# Patient Record
Sex: FEMALE | Race: WHITE | NOT HISPANIC OR LATINO | Employment: UNEMPLOYED | URBAN - METROPOLITAN AREA
[De-identification: names, ages, dates, MRNs, and addresses within clinical notes are randomized per-mention and may not be internally consistent; named-entity substitution may affect disease eponyms.]

---

## 2017-05-01 ENCOUNTER — TELEPHONE (OUTPATIENT)
Dept: RHEUMATOLOGY | Facility: CLINIC | Age: 32
End: 2017-05-01

## 2017-05-01 DIAGNOSIS — M79.7 FIBROMYALGIA: ICD-10-CM

## 2017-05-02 RX ORDER — GABAPENTIN 100 MG/1
CAPSULE ORAL
Qty: 180 CAPSULE | Refills: 0 | Status: SHIPPED | OUTPATIENT
Start: 2017-05-02 | End: 2022-07-01

## 2017-05-02 NOTE — TELEPHONE ENCOUNTER
Patient informed of refill and Dr. Sweeney recommendations for the future refills. Patient verbalized understanding then asked if we were up to date on the new studies that showed the increased in nerve endings with new treatments. Informed patient I would ask if there is any new treatments out for Fibromyalgia .

## 2017-05-02 NOTE — TELEPHONE ENCOUNTER
----- Message from Monisha Sweeney MD sent at 5/2/2017 12:00 PM CDT -----  Let her know I refilled the gabapentin but she needs to ask her primary care doctor or orthopedist for further refills since she will not be coming back to see us in clinic which is fine because her PCP can manage the fibromyalgia.  MB

## 2017-08-09 ENCOUNTER — HISTORICAL (OUTPATIENT)
Dept: INTERNAL MEDICINE | Facility: CLINIC | Age: 32
End: 2017-08-09

## 2017-08-09 LAB
ABS NEUT (OLG): 5.01 X10(3)/MCL (ref 2.1–9.2)
ALBUMIN SERPL-MCNC: 3.9 GM/DL (ref 3.4–5)
ALBUMIN/GLOB SERPL: 1 RATIO (ref 1–2)
ALP SERPL-CCNC: 48 UNIT/L (ref 45–117)
ALT SERPL-CCNC: 29 UNIT/L (ref 12–78)
APPEARANCE, UA: NORMAL
AST SERPL-CCNC: 22 UNIT/L (ref 15–37)
BACTERIA #/AREA URNS AUTO: ABNORMAL /[HPF]
BASOPHILS # BLD AUTO: 0.02 X10(3)/MCL
BASOPHILS NFR BLD AUTO: 0 % (ref 0–1)
BILIRUB SERPL-MCNC: 0.6 MG/DL (ref 0.2–1)
BILIRUB UR QL STRIP: NEGATIVE
BILIRUBIN DIRECT+TOT PNL SERPL-MCNC: 0.1 MG/DL
BILIRUBIN DIRECT+TOT PNL SERPL-MCNC: 0.5 MG/DL
BUN SERPL-MCNC: 19 MG/DL (ref 7–18)
CALCIUM SERPL-MCNC: 9 MG/DL (ref 8.5–10.1)
CHLORIDE SERPL-SCNC: 104 MMOL/L (ref 98–107)
CHOLEST SERPL-MCNC: 157 MG/DL
CHOLEST/HDLC SERPL: 3.1 {RATIO} (ref 0–4.4)
CO2 SERPL-SCNC: 26 MMOL/L (ref 21–32)
COLOR UR: YELLOW
CREAT SERPL-MCNC: 0.8 MG/DL (ref 0.6–1.3)
DEPRECATED CALCIDIOL+CALCIFEROL SERPL-MC: 29.26 NG/ML (ref 30–80)
EOSINOPHIL # BLD AUTO: 0.12 X10(3)/MCL
EOSINOPHIL NFR BLD AUTO: 1 % (ref 0–5)
ERYTHROCYTE [DISTWIDTH] IN BLOOD BY AUTOMATED COUNT: 12 % (ref 11.5–14.5)
ERYTHROCYTE [SEDIMENTATION RATE] IN BLOOD: 13 MM/HR (ref 0–20)
EST. AVERAGE GLUCOSE BLD GHB EST-MCNC: 148 MG/DL
GLOBULIN SER-MCNC: 4 GM/ML (ref 2.3–3.5)
GLUCOSE (UA): NORMAL
GLUCOSE SERPL-MCNC: 85 MG/DL (ref 74–106)
HBA1C MFR BLD: 6.8 % (ref 4.2–6.3)
HCT VFR BLD AUTO: 36.4 % (ref 35–46)
HDLC SERPL-MCNC: 50 MG/DL
HGB BLD-MCNC: 12.3 GM/DL (ref 12–16)
HGB UR QL STRIP: NEGATIVE
HYALINE CASTS #/AREA URNS LPF: ABNORMAL /[LPF]
IMM GRANULOCYTES # BLD AUTO: 0.02 10*3/UL
IMM GRANULOCYTES NFR BLD AUTO: 0 %
KETONES UR QL STRIP: NEGATIVE
LDLC SERPL CALC-MCNC: 86 MG/DL (ref 0–130)
LEUKOCYTE ESTERASE UR QL STRIP: NEGATIVE
LYMPHOCYTES # BLD AUTO: 2.67 X10(3)/MCL
LYMPHOCYTES NFR BLD AUTO: 31 % (ref 15–40)
MAGNESIUM SERPL-MCNC: 2 MG/DL (ref 1.8–2.4)
MCH RBC QN AUTO: 30.4 PG (ref 26–34)
MCHC RBC AUTO-ENTMCNC: 33.8 GM/DL (ref 31–37)
MCV RBC AUTO: 90.1 FL (ref 80–100)
MONOCYTES # BLD AUTO: 0.73 X10(3)/MCL
MONOCYTES NFR BLD AUTO: 8 % (ref 4–12)
NEUTROPHILS # BLD AUTO: 5.01 X10(3)/MCL
NEUTROPHILS NFR BLD AUTO: 58 X10(3)/MCL
NITRITE UR QL STRIP: NEGATIVE
PH UR STRIP: 7.5 [PH] (ref 4.5–8)
PHOSPHATE SERPL-MCNC: 3.3 MG/DL (ref 2.5–4.9)
PLATELET # BLD AUTO: 346 X10(3)/MCL (ref 130–400)
PMV BLD AUTO: 9.7 FL (ref 7.4–10.4)
POTASSIUM SERPL-SCNC: 3.6 MMOL/L (ref 3.5–5.1)
PROT SERPL-MCNC: 7.9 GM/DL (ref 6.4–8.2)
PROT UR QL STRIP: NEGATIVE
RBC # BLD AUTO: 4.04 X10(6)/MCL (ref 4–5.2)
RBC #/AREA URNS AUTO: ABNORMAL /[HPF]
SODIUM SERPL-SCNC: 139 MMOL/L (ref 136–145)
SP GR UR STRIP: 1.02 (ref 1–1.03)
SQUAMOUS #/AREA URNS LPF: ABNORMAL /[LPF]
T4 FREE SERPL-MCNC: 1.14 NG/DL (ref 0.76–1.46)
TRIGL SERPL-MCNC: 106 MG/DL
TSH SERPL-ACNC: 1.14 MIU/L (ref 0.36–3.74)
UROBILINOGEN UR STRIP-ACNC: NORMAL
VLDLC SERPL CALC-MCNC: 21 MG/DL
WBC # SPEC AUTO: 8.6 X10(3)/MCL (ref 4.5–11)
WBC #/AREA URNS AUTO: ABNORMAL /HPF

## 2018-02-16 ENCOUNTER — HISTORICAL (OUTPATIENT)
Dept: INTERNAL MEDICINE | Facility: CLINIC | Age: 33
End: 2018-02-16

## 2018-02-16 LAB
BUN SERPL-MCNC: 15 MG/DL (ref 7–18)
CALCIUM SERPL-MCNC: 9.2 MG/DL (ref 8.5–10.1)
CHLORIDE SERPL-SCNC: 106 MMOL/L (ref 98–107)
CO2 SERPL-SCNC: 26 MMOL/L (ref 21–32)
CREAT SERPL-MCNC: 0.7 MG/DL (ref 0.6–1.3)
CREAT UR-MCNC: 133 MG/DL
DEPRECATED CALCIDIOL+CALCIFEROL SERPL-MC: 31.45 NG/ML (ref 30–80)
EST. AVERAGE GLUCOSE BLD GHB EST-MCNC: 105 MG/DL
GLUCOSE SERPL-MCNC: 86 MG/DL (ref 74–106)
HBA1C MFR BLD: 5.3 % (ref 4.2–6.3)
MICROALBUMIN UR-MCNC: 10.5 MG/L (ref 0–19)
MICROALBUMIN/CREAT RATIO PNL UR: 7.9 MCG/MG CR (ref 0–29)
POTASSIUM SERPL-SCNC: 3.8 MMOL/L (ref 3.5–5.1)
SODIUM SERPL-SCNC: 139 MMOL/L (ref 136–145)

## 2019-02-01 ENCOUNTER — HISTORICAL (OUTPATIENT)
Dept: INTERNAL MEDICINE | Facility: CLINIC | Age: 34
End: 2019-02-01

## 2019-02-01 LAB
BUN SERPL-MCNC: 21 MG/DL (ref 7–18)
CALCIUM SERPL-MCNC: 8.9 MG/DL (ref 8.5–10.1)
CHLORIDE SERPL-SCNC: 105 MMOL/L (ref 98–107)
CO2 SERPL-SCNC: 28 MMOL/L (ref 21–32)
CREAT SERPL-MCNC: 0.7 MG/DL (ref 0.6–1.3)
CREAT/UREA NIT SERPL: 30
EST. AVERAGE GLUCOSE BLD GHB EST-MCNC: 105 MG/DL
GLUCOSE SERPL-MCNC: 78 MG/DL (ref 74–106)
HBA1C MFR BLD: 5.3 % (ref 4.2–6.3)
POTASSIUM SERPL-SCNC: 3.8 MMOL/L (ref 3.5–5.1)
SODIUM SERPL-SCNC: 139 MMOL/L (ref 136–145)

## 2019-02-15 LAB — POC BETA-HCG (QUAL): NEGATIVE

## 2019-07-17 ENCOUNTER — HISTORICAL (OUTPATIENT)
Dept: GASTROENTEROLOGY | Facility: CLINIC | Age: 34
End: 2019-07-17

## 2019-07-17 LAB
ABS NEUT (OLG): 3.29 X10(3)/MCL (ref 2.1–9.2)
ALBUMIN SERPL-MCNC: 4.3 GM/DL (ref 3.4–5)
ALBUMIN/GLOB SERPL: 1 RATIO (ref 1.1–2)
ALP SERPL-CCNC: 59 UNIT/L (ref 45–117)
ALT SERPL-CCNC: 30 UNIT/L (ref 12–78)
AST SERPL-CCNC: 18 UNIT/L (ref 15–37)
BASOPHILS # BLD AUTO: 0.03 X10(3)/MCL
BASOPHILS NFR BLD AUTO: 0 %
BILIRUB SERPL-MCNC: 0.6 MG/DL (ref 0.2–1)
BILIRUBIN DIRECT+TOT PNL SERPL-MCNC: 0.2 MG/DL
BILIRUBIN DIRECT+TOT PNL SERPL-MCNC: 0.4 MG/DL
BUN SERPL-MCNC: 11 MG/DL (ref 7–18)
CALCIUM SERPL-MCNC: 9.7 MG/DL (ref 8.5–10.1)
CHLORIDE SERPL-SCNC: 108 MMOL/L (ref 98–107)
CO2 SERPL-SCNC: 30 MMOL/L (ref 21–32)
CREAT SERPL-MCNC: 0.8 MG/DL (ref 0.6–1.3)
DEPRECATED CALCIDIOL+CALCIFEROL SERPL-MC: 33.5 NG/ML (ref 30–80)
EOSINOPHIL # BLD AUTO: 0.2 X10(3)/MCL
EOSINOPHIL NFR BLD AUTO: 3 %
ERYTHROCYTE [DISTWIDTH] IN BLOOD BY AUTOMATED COUNT: 13 % (ref 11.5–14.5)
EST. AVERAGE GLUCOSE BLD GHB EST-MCNC: 105 MG/DL
FERRITIN SERPL-MCNC: 15.9 NG/ML (ref 10–150)
GLOBULIN SER-MCNC: 4.1 GM/ML (ref 2.3–3.5)
GLUCOSE SERPL-MCNC: 90 MG/DL (ref 74–106)
HBA1C MFR BLD: 5.3 % (ref 4.2–6.3)
HCT VFR BLD AUTO: 39.9 % (ref 35–46)
HGB BLD-MCNC: 12.7 GM/DL (ref 12–16)
IMM GRANULOCYTES # BLD AUTO: 0.01 10*3/UL
IMM GRANULOCYTES NFR BLD AUTO: 0 %
LYMPHOCYTES # BLD AUTO: 2.65 X10(3)/MCL
LYMPHOCYTES NFR BLD AUTO: 39 % (ref 13–40)
MCH RBC QN AUTO: 30.3 PG (ref 26–34)
MCHC RBC AUTO-ENTMCNC: 31.8 GM/DL (ref 31–37)
MCV RBC AUTO: 95.2 FL (ref 80–100)
MONOCYTES # BLD AUTO: 0.56 X10(3)/MCL
MONOCYTES NFR BLD AUTO: 8 % (ref 4–12)
NEUTROPHILS # BLD AUTO: 3.29 X10(3)/MCL
NEUTROPHILS NFR BLD AUTO: 49 X10(3)/MCL
PLATELET # BLD AUTO: 356 X10(3)/MCL (ref 130–400)
PMV BLD AUTO: 9.2 FL (ref 7.4–10.4)
POTASSIUM SERPL-SCNC: 3.9 MMOL/L (ref 3.5–5.1)
PROT SERPL-MCNC: 8.4 GM/DL (ref 6.4–8.2)
RBC # BLD AUTO: 4.19 X10(6)/MCL (ref 4–5.2)
SODIUM SERPL-SCNC: 140 MMOL/L (ref 136–145)
TSH SERPL-ACNC: 2.03 MIU/L (ref 0.36–3.74)
WBC # SPEC AUTO: 6.7 X10(3)/MCL (ref 4.5–11)

## 2019-11-08 ENCOUNTER — HISTORICAL (OUTPATIENT)
Dept: INTERNAL MEDICINE | Facility: CLINIC | Age: 34
End: 2019-11-08

## 2019-11-08 LAB
ABS NEUT (OLG): 6.29 X10(3)/MCL (ref 2.1–9.2)
ALBUMIN SERPL-MCNC: 4.2 GM/DL (ref 3.4–5)
ALBUMIN/GLOB SERPL: 1.2 RATIO (ref 1.1–2)
ALP SERPL-CCNC: 61 UNIT/L (ref 45–117)
ALT SERPL-CCNC: 19 UNIT/L (ref 12–78)
AST SERPL-CCNC: 16 UNIT/L (ref 15–37)
BASOPHILS # BLD AUTO: 0 X10(3)/MCL (ref 0–0.2)
BASOPHILS NFR BLD AUTO: 0 %
BILIRUB SERPL-MCNC: 0.9 MG/DL (ref 0.2–1)
BILIRUBIN DIRECT+TOT PNL SERPL-MCNC: 0.2 MG/DL (ref 0–0.2)
BILIRUBIN DIRECT+TOT PNL SERPL-MCNC: 0.7 MG/DL
BUN SERPL-MCNC: 22 MG/DL (ref 7–18)
CALCIUM SERPL-MCNC: 9.2 MG/DL (ref 8.5–10.1)
CHLORIDE SERPL-SCNC: 106 MMOL/L (ref 98–107)
CO2 SERPL-SCNC: 27 MMOL/L (ref 21–32)
CREAT SERPL-MCNC: 0.8 MG/DL (ref 0.6–1.3)
EOSINOPHIL # BLD AUTO: 0 X10(3)/MCL (ref 0–0.9)
EOSINOPHIL NFR BLD AUTO: 0 %
ERYTHROCYTE [DISTWIDTH] IN BLOOD BY AUTOMATED COUNT: 12.2 % (ref 11.5–14.5)
GLOBULIN SER-MCNC: 3.6 GM/ML (ref 2.3–3.5)
GLUCOSE SERPL-MCNC: 91 MG/DL (ref 74–106)
HCT VFR BLD AUTO: 39 % (ref 35–46)
HGB BLD-MCNC: 12.4 GM/DL (ref 12–16)
IMM GRANULOCYTES # BLD AUTO: 0.03 10*3/UL
IMM GRANULOCYTES NFR BLD AUTO: 0 %
LYMPHOCYTES # BLD AUTO: 2.6 X10(3)/MCL (ref 0.6–4.6)
LYMPHOCYTES NFR BLD AUTO: 26 %
MCH RBC QN AUTO: 30.5 PG (ref 26–34)
MCHC RBC AUTO-ENTMCNC: 31.8 GM/DL (ref 31–37)
MCV RBC AUTO: 95.8 FL (ref 80–100)
MONOCYTES # BLD AUTO: 1 X10(3)/MCL (ref 0.1–1.3)
MONOCYTES NFR BLD AUTO: 10 %
NEUTROPHILS # BLD AUTO: 6.29 X10(3)/MCL (ref 2.1–9.2)
NEUTROPHILS NFR BLD AUTO: 63 %
PLATELET # BLD AUTO: 353 X10(3)/MCL (ref 130–400)
PMV BLD AUTO: 9.2 FL (ref 7.4–10.4)
POTASSIUM SERPL-SCNC: 4.1 MMOL/L (ref 3.5–5.1)
PROT SERPL-MCNC: 7.8 GM/DL (ref 6.4–8.2)
RBC # BLD AUTO: 4.07 X10(6)/MCL (ref 4–5.2)
SODIUM SERPL-SCNC: 140 MMOL/L (ref 136–145)
WBC # SPEC AUTO: 9.9 X10(3)/MCL (ref 4.5–11)

## 2019-12-30 ENCOUNTER — HISTORICAL (OUTPATIENT)
Dept: ENDOSCOPY | Facility: HOSPITAL | Age: 34
End: 2019-12-30

## 2020-02-04 ENCOUNTER — HISTORICAL (OUTPATIENT)
Dept: INTERNAL MEDICINE | Facility: CLINIC | Age: 35
End: 2020-02-04

## 2020-02-04 LAB
ABS NEUT (OLG): 3.53 X10(3)/MCL (ref 2.1–9.2)
ALBUMIN SERPL-MCNC: 3.8 GM/DL (ref 3.4–5)
ALBUMIN/GLOB SERPL: 1 RATIO (ref 1.1–2)
ALP SERPL-CCNC: 64 UNIT/L (ref 45–117)
ALT SERPL-CCNC: 24 UNIT/L (ref 12–78)
AST SERPL-CCNC: 14 UNIT/L (ref 15–37)
BASOPHILS # BLD AUTO: 0 X10(3)/MCL (ref 0–0.2)
BASOPHILS NFR BLD AUTO: 0 %
BILIRUB SERPL-MCNC: 0.5 MG/DL (ref 0.2–1)
BILIRUBIN DIRECT+TOT PNL SERPL-MCNC: 0.1 MG/DL (ref 0–0.2)
BILIRUBIN DIRECT+TOT PNL SERPL-MCNC: 0.4 MG/DL
BUN SERPL-MCNC: 18 MG/DL (ref 7–18)
CALCIUM SERPL-MCNC: 9 MG/DL (ref 8.5–10.1)
CHLORIDE SERPL-SCNC: 106 MMOL/L (ref 98–107)
CO2 SERPL-SCNC: 27 MMOL/L (ref 21–32)
CREAT SERPL-MCNC: 0.7 MG/DL (ref 0.6–1.3)
EOSINOPHIL # BLD AUTO: 0.1 X10(3)/MCL (ref 0–0.9)
EOSINOPHIL NFR BLD AUTO: 2 %
ERYTHROCYTE [DISTWIDTH] IN BLOOD BY AUTOMATED COUNT: 11.9 % (ref 11.5–14.5)
EST. AVERAGE GLUCOSE BLD GHB EST-MCNC: 100 MG/DL
GLOBULIN SER-MCNC: 3.9 GM/ML (ref 2.3–3.5)
GLUCOSE SERPL-MCNC: 94 MG/DL (ref 74–106)
HBA1C MFR BLD: 5.1 % (ref 4.2–6.3)
HCT VFR BLD AUTO: 36.2 % (ref 35–46)
HGB BLD-MCNC: 11.6 GM/DL (ref 12–16)
IMM GRANULOCYTES # BLD AUTO: 0.01 10*3/UL
IMM GRANULOCYTES NFR BLD AUTO: 0 %
LYMPHOCYTES # BLD AUTO: 2 X10(3)/MCL (ref 0.6–4.6)
LYMPHOCYTES NFR BLD AUTO: 32 %
MCH RBC QN AUTO: 29.7 PG (ref 26–34)
MCHC RBC AUTO-ENTMCNC: 32 GM/DL (ref 31–37)
MCV RBC AUTO: 92.8 FL (ref 80–100)
MONOCYTES # BLD AUTO: 0.6 X10(3)/MCL (ref 0.1–1.3)
MONOCYTES NFR BLD AUTO: 9 %
NEUTROPHILS # BLD AUTO: 3.53 X10(3)/MCL (ref 2.1–9.2)
NEUTROPHILS NFR BLD AUTO: 57 %
PLATELET # BLD AUTO: 362 X10(3)/MCL (ref 130–400)
PMV BLD AUTO: 9.1 FL (ref 7.4–10.4)
POTASSIUM SERPL-SCNC: 3.9 MMOL/L (ref 3.5–5.1)
PROT SERPL-MCNC: 7.7 GM/DL (ref 6.4–8.2)
RBC # BLD AUTO: 3.9 X10(6)/MCL (ref 4–5.2)
SODIUM SERPL-SCNC: 137 MMOL/L (ref 136–145)
WBC # SPEC AUTO: 6.2 X10(3)/MCL (ref 4.5–11)

## 2020-05-21 ENCOUNTER — HISTORICAL (OUTPATIENT)
Dept: LAB | Facility: HOSPITAL | Age: 35
End: 2020-05-21

## 2020-05-21 LAB
ABS NEUT (OLG): 4.31 X10(3)/MCL (ref 2.1–9.2)
ALBUMIN SERPL-MCNC: 3.9 GM/DL (ref 3.4–5)
ALBUMIN/GLOB SERPL: 0.9 RATIO (ref 1.1–2)
ALP SERPL-CCNC: 68 UNIT/L (ref 45–117)
ALT SERPL-CCNC: 26 UNIT/L (ref 12–78)
AST SERPL-CCNC: 21 UNIT/L (ref 15–37)
BASOPHILS # BLD AUTO: 0 X10(3)/MCL (ref 0–0.2)
BASOPHILS NFR BLD AUTO: 0 %
BILIRUB SERPL-MCNC: 0.7 MG/DL (ref 0.2–1)
BILIRUBIN DIRECT+TOT PNL SERPL-MCNC: 0.1 MG/DL (ref 0–0.2)
BILIRUBIN DIRECT+TOT PNL SERPL-MCNC: 0.6 MG/DL
BUN SERPL-MCNC: 9 MG/DL (ref 7–18)
CALCIUM SERPL-MCNC: 9.2 MG/DL (ref 8.5–10.1)
CHLORIDE SERPL-SCNC: 105 MMOL/L (ref 98–107)
CO2 SERPL-SCNC: 27 MMOL/L (ref 21–32)
CREAT SERPL-MCNC: 0.8 MG/DL (ref 0.6–1.3)
EOSINOPHIL # BLD AUTO: 0.2 X10(3)/MCL (ref 0–0.9)
EOSINOPHIL NFR BLD AUTO: 2 %
ERYTHROCYTE [DISTWIDTH] IN BLOOD BY AUTOMATED COUNT: 12.4 % (ref 11.5–14.5)
GLOBULIN SER-MCNC: 4.5 GM/ML (ref 2.3–3.5)
GLUCOSE SERPL-MCNC: 94 MG/DL (ref 74–106)
HBV SURFACE AG SERPL QL IA: NONREACTIVE
HCT VFR BLD AUTO: 38.3 % (ref 35–46)
HCV AB SERPL QL IA: NONREACTIVE
HGB BLD-MCNC: 12.5 GM/DL (ref 12–16)
HIV 1+2 AB+HIV1 P24 AG SERPL QL IA: NONREACTIVE
IMM GRANULOCYTES # BLD AUTO: 0.02 10*3/UL
IMM GRANULOCYTES NFR BLD AUTO: 0 %
LYMPHOCYTES # BLD AUTO: 2.4 X10(3)/MCL (ref 0.6–4.6)
LYMPHOCYTES NFR BLD AUTO: 31 %
MCH RBC QN AUTO: 29.8 PG (ref 26–34)
MCHC RBC AUTO-ENTMCNC: 32.6 GM/DL (ref 31–37)
MCV RBC AUTO: 91.2 FL (ref 80–100)
MONOCYTES # BLD AUTO: 0.6 X10(3)/MCL (ref 0.1–1.3)
MONOCYTES NFR BLD AUTO: 8 %
NEUTROPHILS # BLD AUTO: 4.31 X10(3)/MCL (ref 2.1–9.2)
NEUTROPHILS NFR BLD AUTO: 57 %
PLATELET # BLD AUTO: 367 X10(3)/MCL (ref 130–400)
PMV BLD AUTO: 9 FL (ref 7.4–10.4)
POTASSIUM SERPL-SCNC: 3.9 MMOL/L (ref 3.5–5.1)
PROT SERPL-MCNC: 8.4 GM/DL (ref 6.4–8.2)
RBC # BLD AUTO: 4.2 X10(6)/MCL (ref 4–5.2)
SODIUM SERPL-SCNC: 138 MMOL/L (ref 136–145)
T PALLIDUM AB SER QL: NONREACTIVE
T4 FREE SERPL-MCNC: 1.11 NG/DL (ref 0.76–1.46)
TSH SERPL-ACNC: 1.64 MIU/L (ref 0.36–3.74)
WBC # SPEC AUTO: 7.5 X10(3)/MCL (ref 4.5–11)

## 2020-05-25 ENCOUNTER — HISTORICAL (OUTPATIENT)
Dept: LAB | Facility: HOSPITAL | Age: 35
End: 2020-05-25

## 2020-05-25 ENCOUNTER — HISTORICAL (OUTPATIENT)
Dept: ADMINISTRATIVE | Facility: HOSPITAL | Age: 35
End: 2020-05-25

## 2020-05-25 LAB
CRP SERPL-MCNC: <0.3 MG/DL
ERYTHROCYTE [SEDIMENTATION RATE] IN BLOOD: 12 MM/HR (ref 0–20)

## 2020-06-05 ENCOUNTER — HISTORICAL (OUTPATIENT)
Dept: SLEEP MEDICINE | Facility: HOSPITAL | Age: 35
End: 2020-06-05

## 2020-08-28 ENCOUNTER — HISTORICAL (OUTPATIENT)
Dept: GASTROENTEROLOGY | Facility: CLINIC | Age: 35
End: 2020-08-28

## 2020-08-28 LAB
ABS NEUT (OLG): 7.14 X10(3)/MCL (ref 2.1–9.2)
ALBUMIN SERPL-MCNC: 3.9 GM/DL (ref 3.4–5)
ALBUMIN/GLOB SERPL: 0.9 RATIO (ref 1.1–2)
ALP SERPL-CCNC: 58 UNIT/L (ref 45–117)
ALT SERPL-CCNC: 26 UNIT/L (ref 12–78)
AST SERPL-CCNC: 17 UNIT/L (ref 15–37)
BASOPHILS # BLD AUTO: 0 X10(3)/MCL (ref 0–0.2)
BASOPHILS NFR BLD AUTO: 0 %
BILIRUB SERPL-MCNC: 0.3 MG/DL (ref 0.2–1)
BILIRUBIN DIRECT+TOT PNL SERPL-MCNC: 0.1 MG/DL (ref 0–0.2)
BILIRUBIN DIRECT+TOT PNL SERPL-MCNC: 0.2 MG/DL
BUN SERPL-MCNC: 25 MG/DL (ref 7–18)
CALCIUM SERPL-MCNC: 9.8 MG/DL (ref 8.5–10.1)
CHLORIDE SERPL-SCNC: 101 MMOL/L (ref 98–107)
CO2 SERPL-SCNC: 26 MMOL/L (ref 21–32)
CREAT SERPL-MCNC: 0.8 MG/DL (ref 0.6–1.3)
CRP SERPL-MCNC: <0.3 MG/DL
EOSINOPHIL # BLD AUTO: 0.3 X10(3)/MCL (ref 0–0.9)
EOSINOPHIL NFR BLD AUTO: 3 %
ERYTHROCYTE [DISTWIDTH] IN BLOOD BY AUTOMATED COUNT: 12.2 % (ref 11.5–14.5)
GLOBULIN SER-MCNC: 4.2 GM/ML (ref 2.3–3.5)
GLUCOSE SERPL-MCNC: 86 MG/DL (ref 74–106)
HCT VFR BLD AUTO: 39.7 % (ref 35–46)
HGB BLD-MCNC: 13 GM/DL (ref 12–16)
IMM GRANULOCYTES # BLD AUTO: 0.06 10*3/UL
IMM GRANULOCYTES NFR BLD AUTO: 0 %
LYMPHOCYTES # BLD AUTO: 3.4 X10(3)/MCL (ref 0.6–4.6)
LYMPHOCYTES NFR BLD AUTO: 29 %
MCH RBC QN AUTO: 30.2 PG (ref 26–34)
MCHC RBC AUTO-ENTMCNC: 32.7 GM/DL (ref 31–37)
MCV RBC AUTO: 92.1 FL (ref 80–100)
MONOCYTES # BLD AUTO: 0.9 X10(3)/MCL (ref 0.1–1.3)
MONOCYTES NFR BLD AUTO: 8 %
NEUTROPHILS # BLD AUTO: 7.14 X10(3)/MCL (ref 2.1–9.2)
NEUTROPHILS NFR BLD AUTO: 60 %
PLATELET # BLD AUTO: 432 X10(3)/MCL (ref 130–400)
PMV BLD AUTO: 9 FL (ref 7.4–10.4)
POTASSIUM SERPL-SCNC: 4 MMOL/L (ref 3.5–5.1)
PROT SERPL-MCNC: 8.1 GM/DL (ref 6.4–8.2)
RBC # BLD AUTO: 4.31 X10(6)/MCL (ref 4–5.2)
SODIUM SERPL-SCNC: 136 MMOL/L (ref 136–145)
WBC # SPEC AUTO: 11.9 X10(3)/MCL (ref 4.5–11)

## 2020-09-11 ENCOUNTER — HISTORICAL (OUTPATIENT)
Dept: RADIOLOGY | Facility: HOSPITAL | Age: 35
End: 2020-09-11

## 2020-09-24 ENCOUNTER — HISTORICAL (OUTPATIENT)
Dept: GASTROENTEROLOGY | Facility: CLINIC | Age: 35
End: 2020-09-24

## 2020-11-02 ENCOUNTER — HISTORICAL (OUTPATIENT)
Dept: LAB | Facility: HOSPITAL | Age: 35
End: 2020-11-02

## 2020-11-06 LAB — POC BETA-HCG (QUAL): NEGATIVE

## 2020-11-20 ENCOUNTER — HISTORICAL (OUTPATIENT)
Dept: GASTROENTEROLOGY | Facility: CLINIC | Age: 35
End: 2020-11-20

## 2020-11-25 ENCOUNTER — HISTORICAL (OUTPATIENT)
Dept: ENDOSCOPY | Facility: HOSPITAL | Age: 35
End: 2020-11-25

## 2020-11-25 LAB — CRC RECOMMENDATION EXT: NORMAL

## 2021-04-26 ENCOUNTER — HISTORICAL (OUTPATIENT)
Dept: INTERNAL MEDICINE | Facility: CLINIC | Age: 36
End: 2021-04-26

## 2021-04-26 LAB
ABS NEUT (OLG): 5.3 X10(3)/MCL (ref 2.1–9.2)
ALBUMIN SERPL-MCNC: 3.9 GM/DL (ref 3.5–5)
ALBUMIN/GLOB SERPL: 1.2 RATIO (ref 1.1–2)
ALP SERPL-CCNC: 54 UNIT/L (ref 40–150)
ALT SERPL-CCNC: 16 UNIT/L (ref 0–55)
AST SERPL-CCNC: 27 UNIT/L (ref 5–34)
BASOPHILS # BLD AUTO: 0 X10(3)/MCL (ref 0–0.2)
BASOPHILS NFR BLD AUTO: 0 %
BILIRUB SERPL-MCNC: 0.3 MG/DL
BILIRUBIN DIRECT+TOT PNL SERPL-MCNC: 0.1 MG/DL (ref 0–0.5)
BILIRUBIN DIRECT+TOT PNL SERPL-MCNC: 0.2 MG/DL (ref 0–0.8)
BUN SERPL-MCNC: 16.2 MG/DL (ref 7–18.7)
CALCIUM SERPL-MCNC: 9.4 MG/DL (ref 8.4–10.2)
CHLORIDE SERPL-SCNC: 104 MMOL/L (ref 98–107)
CO2 SERPL-SCNC: 24 MMOL/L (ref 22–29)
CREAT SERPL-MCNC: 0.72 MG/DL (ref 0.55–1.02)
DEPRECATED CALCIDIOL+CALCIFEROL SERPL-MC: 36.8 NG/ML (ref 30–80)
EOSINOPHIL # BLD AUTO: 0.1 X10(3)/MCL (ref 0–0.9)
EOSINOPHIL NFR BLD AUTO: 1 %
ERYTHROCYTE [DISTWIDTH] IN BLOOD BY AUTOMATED COUNT: 11.9 % (ref 11.5–14.5)
GLOBULIN SER-MCNC: 3.3 GM/DL (ref 2.4–3.5)
GLUCOSE SERPL-MCNC: 90 MG/DL (ref 74–100)
HCT VFR BLD AUTO: 36 % (ref 35–46)
HGB BLD-MCNC: 11.8 GM/DL (ref 12–16)
IMM GRANULOCYTES # BLD AUTO: 0.03 10*3/UL
IMM GRANULOCYTES NFR BLD AUTO: 0 %
LYMPHOCYTES # BLD AUTO: 2.8 X10(3)/MCL (ref 0.6–4.6)
LYMPHOCYTES NFR BLD AUTO: 32 %
MCH RBC QN AUTO: 30.5 PG (ref 26–34)
MCHC RBC AUTO-ENTMCNC: 32.8 GM/DL (ref 31–37)
MCV RBC AUTO: 93 FL (ref 80–100)
MONOCYTES # BLD AUTO: 0.7 X10(3)/MCL (ref 0.1–1.3)
MONOCYTES NFR BLD AUTO: 8 %
NEUTROPHILS # BLD AUTO: 5.3 X10(3)/MCL (ref 2.1–9.2)
NEUTROPHILS NFR BLD AUTO: 59 %
PLATELET # BLD AUTO: 341 X10(3)/MCL (ref 130–400)
PMV BLD AUTO: 9.5 FL (ref 7.4–10.4)
POTASSIUM SERPL-SCNC: 3.8 MMOL/L (ref 3.5–5.1)
PROT SERPL-MCNC: 7.2 GM/DL (ref 6.4–8.3)
RBC # BLD AUTO: 3.87 X10(6)/MCL (ref 4–5.2)
SODIUM SERPL-SCNC: 137 MMOL/L (ref 136–145)
WBC # SPEC AUTO: 9 X10(3)/MCL (ref 4.5–11)

## 2021-05-12 ENCOUNTER — PATIENT MESSAGE (OUTPATIENT)
Dept: RESEARCH | Facility: HOSPITAL | Age: 36
End: 2021-05-12

## 2021-11-10 ENCOUNTER — HISTORICAL (OUTPATIENT)
Dept: ADMINISTRATIVE | Facility: HOSPITAL | Age: 36
End: 2021-11-10

## 2022-04-10 ENCOUNTER — HISTORICAL (OUTPATIENT)
Dept: ADMINISTRATIVE | Facility: HOSPITAL | Age: 37
End: 2022-04-10
Payer: COMMERCIAL

## 2022-04-11 ENCOUNTER — HISTORICAL (OUTPATIENT)
Dept: ADMINISTRATIVE | Facility: HOSPITAL | Age: 37
End: 2022-04-11
Payer: COMMERCIAL

## 2022-04-28 VITALS
SYSTOLIC BLOOD PRESSURE: 114 MMHG | BODY MASS INDEX: 25.07 KG/M2 | WEIGHT: 136.25 LBS | HEIGHT: 62 IN | DIASTOLIC BLOOD PRESSURE: 83 MMHG | OXYGEN SATURATION: 99 %

## 2022-04-28 VITALS
OXYGEN SATURATION: 99 % | DIASTOLIC BLOOD PRESSURE: 83 MMHG | WEIGHT: 136.25 LBS | SYSTOLIC BLOOD PRESSURE: 114 MMHG | HEIGHT: 62 IN | BODY MASS INDEX: 25.07 KG/M2

## 2022-05-03 NOTE — HISTORICAL [facility] C[facility]ER
This is a historical note converted from [employer]. Formatting and pictures may have been removed.  Please reference [employer] for original formatting and attached multimedia. Chief Complaint  Constipation  History of Present Illness  Patient is a 34 yo F referred for colonoscopy to evaluate a h/o constipation. The patient has been diagnosed w/ IBS and takes Linzess. Without Linzess, she never has a BM. With Linzess, she has a BM every 1-2 days. Her BMs are always liquid. She denies noticing any blood in/on the stool. She also reports near-daily lower abdominal pain. Sometimes the pain is constant, sometimes it is intermittent. She describes the pain and cramping, burning, and feeling her intestines move. When it has been more than 1-2 days since she has had a BM, she has some nausea and dysphagia. She denies any changes in her appetite or unintentional weight changes.  ?  She has had an EGD in the past (about a year ago), which she reports was normal. She has never had a colonoscopy. She denies any known personal or FHx of problems w/ anesthesia or bleeding disorders. She does not take any blood thinners. She does take NSAIDs for hip pain, but has not taken any recently. She?has a +FHx of Crohns disease (brother, uncle), but denies any known FHx of esophageal, gastric, colon, or rectal cancers.  Review of Systems  Negative except as mentioned in HPI.  Physical Exam  Gen: NAD  Neuro/Psych: awake, alert, answering questions appropriately  CV: RRR  Resp: non-labored breathing, TUSHAR  Abd: soft, ND, NT  Ext: moves all 4 spontaneously and purposefully  Skin: warm, well-perfused  Wounds: none  Assessment/Plan  34 yo F w/ constipation. Plan to proceed w/ colonoscopy today. Written informed consent obtained. If no complications arise and patient tolerates procedure well, plan to dc home post-procedure.   Problem List/Past Medical History  Ongoing  Anxiety  AR (allergic rhinitis)  Cervical dysplasia  Chronic  migraine  Constipation  Depression  Fibromyalgia  IBS (irritable bowel syndrome)  Migraine without aura, not intractable, with status migrainosus  Nasal congestion  Pelvic pain  Tension headache  Vulvodynia  Historical  Pregnant  Procedure/Surgical History  Esophagogastroduodenoscopy, flexible, transoral; diagnostic, including collection of specimen(s) by brushing or washing, when performed (separate procedure) (12/30/2019)  Lithotripsy (01/01/2006)  Colposcopy of cervix  Varicose vein operation   Medications  Home  acetaminophen-tramadol 325 mg-37.5 mg oral tablet, See Instructions, 2 refills  Astelin 137 mcg/inh nasal spray, 1 spray(s), Nasal, BID, 5 refills  atenolol 25 mg oral tablet, 25 mg= 1 tab(s), Oral, At Bedtime, 4 refills  docusate-senna 50 mg-8.6 mg oral tablet, 2 tab(s), Oral, Once a day (at bedtime), PRN, 1 refills  DULoxetine 60 mg oral delayed release capsule, 60 mg= 1 cap(s), Oral, Daily  Estrace Vaginal 0.1 mg/g vaginal cream, See Instructions  fluticasone 50 mcg/inh nasal spray, 1 spray(s), Nasal, BID, 5 refills  ibuprofen 800 mg oral tablet, 800 mg= 1 tab(s), Oral, q8hr  Linzess 290 mcg oral capsule, 290 mcg= 1 cap(s), Oral, Daily, 5 refills  loratadine 10 mg oral tablet, 10 mg= 1 tab(s), Oral, Daily, 3 refills  Maxalt 10 mg oral tablet, 10 mg= 1 tab(s), Oral, Daily, PRN, 3 refills  riboflavin 400 mg oral capsule, 400 mg= 1 cap(s), Oral, Daily, 6 refills  Singulair 10 mg oral TABLET, 10 mg= 1 tab(s), Oral, Daily, 2 refills  Sprintec 0.25 mg-35 mcg oral tablet, 1 tab(s), Oral, Daily, 6 refills  traZODone 100 mg oral tablet, See Instructions  Allergies  Sulfabenzamide/Sulfacetamide/Sulfathiazole?(Rash)  Social History  Alcohol  Current, Liquor, 1-2 times per year, Alcohol use interferes with work or home: No. Others hurt by drinking: No. Household alcohol concerns: Yes., 10/28/2020  Substance Use  Never, 10/03/2017  Tobacco  Never (less than 100 in lifetime), N/A, 11/24/2020  Family History  Crohns  disease.: Brother and Uncle.  Primary malignant neoplasm of breast: Aunt.  Primary malignant neoplasm of prostate: Paternal Grandfather.      Chronic constipation for several years.? Seeing isacc in clinic and started on Linzess, uptitrated to 290mcg daily.? Taking Linzess daily along with water and usually has a BM every day but will sometimes miss days as well.? this past week she was having significant constipation despite linzess and even took 2 linzess to try to help.? She has chronic lower abd pain, sometimes intermittent cramping and spasming.? Not always related to BMs or constipation.? Has been given bentyl but has never tried it.? WEight is stable.? No significant bleeding.

## 2022-05-03 NOTE — HISTORICAL [facility]
This is a historical note converted from [employer]. Formatting and pictures may have been removed.  Please reference [employer] for original formatting and attached multimedia. Chief Complaint  abdominal pain, bloating  History of Present Illness  33 yo F with hx of fibromyalgia and chronic constipation who is followed regularly in GI clinic for chronic constipation and GERD who presents today for EGD.?See?clinic note for full?history.?Her main complaints today?are of epigastric pain, bloating, belching, and flatulence.? These symptoms are worsened with ingestion of sugar or gluten, which she tries to avoid.? Weight is stable.? She does have occasional reflux as well and takes OTC tums prn.??? She denies nocturnal symptoms, vomiting, fever, chills, dysphagia.? She does take linzess daily for constipation and has a soft bowel movement daily as long as she adheres to that regimen. Sometimes needs to supplement with Senna.??She has nausea without emesis when?constipated.?She denies melena,?hematochezia,?or incontinence.? No prior endoscopy.??Her brother and uncle have a history of Crohns disease, father has ulcerative colitis. No polyps or colon cancer to her knowledge.  Review of Systems  12 point review of systems was conducted and is significant for the above findings.  Physical Exam  Vitals & Measurements  T:?36.7? ?C (Oral)? HR:?80(Monitored)? RR:?18? BP:?124/91? SpO2:?100%? WT:?60.9?kg? BMI:?24.56?  Afebrile, VSS  Gen: NAD, AAOx3  Neuro: CN II-XII grossly intact  HEENT: NCAT, PERRLA, EOMI  CV: Normal rate, regular rhythm, no murmurs/gallops/rubs  Resp: Clear to auscultation bilaterally, no wheezes or rhonchi  Abdomen: soft, mild tenderness bilateral lower quadrants, mildly distended  Extremities: warm and well perfused, no clubbing or edema  Skin: no rashes or lesions  Assessment/Plan  33 yo F with constipation, GERD, bloating  - proceed with EGD today  - continue current bowel regimen  - risks/benefits/alternatives  discussed  - see op note for details  ?   Problem List/Past Medical History  Ongoing  Cervical dysplasia  Chronic migraine  Constipation  Historical  No qualifying data  Procedure/Surgical History  Lithotripsy (01/01/2006)  Colposcopy of cervix  Varicose vein operation   Medications  Inpatient  [id number] 1,000 mL, 1000 mL, IV  Home  docusate-senna 50 mg-8.6 mg oral tablet, 2 tab(s), Oral, Once a day (at bedtime), PRN, 2 refills  DULoxetine 60 mg oral delayed release capsule, See Instructions  Levsin 0.125 mg oral tablet, 0.125 mg= 1 tab(s), Oral, q8hr, PRN, 2 refills,? ?Not taking  Linzess 145 mcg oral capsule, 145 mcg= 1 cap(s), Oral, Daily,? ?Not taking  Linzess 290 mcg oral capsule, 290 mcg= 1 cap(s), Oral, Daily, 5 refills  multivitamin with minerals (Adult Tab), 1 tab(s), Oral, Daily  SUMAtriptan 50 mg oral tablet, See Instructions, PRN, 3 refills  Zyrtec 10 mg oral tablet, 10 mg= 1 tab(s), Oral, Daily,? 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 Alcohol abuse in household: No. Substance abuse in household: No. Smoker in household: No. Injuries/Abuse/Neglect in household: No. Feels unsafe at home: No. Safe place to go: Yes. Family/Friends available for support: Yes., 02/05/2019  Nutrition/Health  Regular, 02/05/2019  Sexual  Sexually active: Yes. Sexual orientation: Straight or heterosexual. Uses condoms: No., 02/15/2019  Substance Use  Never, 10/03/2017  Tobacco  Never (less than 100 in lifetime), N/A, 12/30/2019  Family History  Crohns disease.: Brother and Uncle.  Primary malignant neoplasm of breast: Aunt.  Primary malignant neoplasm of prostate: Paternal Grandfather.  Lab Results  Test Name Test Result Date/Time   Sodium Lvl 140 mmol/L 11/08/2019 10:50  CST   Potassium Lvl 4.1 mmol/L 11/08/2019 10:50 CST   Chloride 106 mmol/L 11/08/2019 10:50 CST   CO2 27 mmol/L 11/08/2019 10:50 CST   Calcium Lvl 9.2 mg/dL 11/08/2019 10:50 CST   Glucose Lvl 91 mg/dL 11/08/2019 10:50 CST   BUN 22 mg/dL (High) 11/08/2019 10:50 CST   Creatinine 0.80 mg/dL 11/08/2019 10:50 CST   Bili Total 0.9 mg/dL 11/08/2019 10:50 CST   Bili Direct 0.2 mg/dL 11/08/2019 10:50 CST   Bili Indirect 0.7 mg/dL 11/08/2019 10:50 CST   AST 16 unit/L 11/08/2019 10:50 CST   ALT 19 unit/L 11/08/2019 10:50 CST   Alk Phos 61 unit/L 11/08/2019 10:50 CST   WBC 9.9 x10(3)/mcL 11/08/2019 10:50 CST   WBC 6.7 x10(3)/mcL 07/17/2019 10:26 CDT   Hgb 12.4 gm/dL 11/08/2019 10:50 CST   Hct 39.0 % 11/08/2019 10:50 CST   Platelet 353 x10(3)/mcL 11/08/2019 10:50 CST   MCV 95.8 fL 11/08/2019 10:50 CST   Diagnostic Results  (07/17/2019 10:37 CDT XR Abdomen Flat and Erect)  Impression:  Stool noted throughout the colon. Findings may be seen with  constipation.  ?  ? [1]     [1]?XR Abdomen Flat and Erect; Nell VELEZ, Michael Eisenberg 07/17/2019 10:37 CDT   Reports problems with bloating despite taking phazyme, following a healthy diet, and having better bowel regimen with linzess.? She is taking lizess 290mcg daily and having loose stools but daily stools.? Over the holidays her eating was bad and she became more constipated and had at take senna and milk?of magnesia.? She used to take senna frequently but now is not having to take it as often.? weight is stable.? Takes tums prn for indigestion which seems to be related to constipation episodes.

## 2022-06-03 RX ORDER — TIZANIDINE 2 MG/1
TABLET ORAL
Qty: 30 TABLET | Refills: 5 | Status: SHIPPED | OUTPATIENT
Start: 2022-06-03 | End: 2022-07-01

## 2022-07-01 ENCOUNTER — OFFICE VISIT (OUTPATIENT)
Dept: RHEUMATOLOGY | Facility: CLINIC | Age: 37
End: 2022-07-01
Payer: COMMERCIAL

## 2022-07-01 VITALS
WEIGHT: 130.81 LBS | OXYGEN SATURATION: 99 % | HEART RATE: 80 BPM | HEIGHT: 62 IN | RESPIRATION RATE: 18 BRPM | SYSTOLIC BLOOD PRESSURE: 140 MMHG | TEMPERATURE: 98 F | BODY MASS INDEX: 24.07 KG/M2 | DIASTOLIC BLOOD PRESSURE: 71 MMHG

## 2022-07-01 DIAGNOSIS — R10.2 PAIN IN PELVIS: ICD-10-CM

## 2022-07-01 DIAGNOSIS — G43.001 MIGRAINE WITHOUT AURA AND WITH STATUS MIGRAINOSUS, NOT INTRACTABLE: ICD-10-CM

## 2022-07-01 DIAGNOSIS — M79.7 FIBROMYALGIA: ICD-10-CM

## 2022-07-01 DIAGNOSIS — K58.9 IRRITABLE BOWEL SYNDROME, UNSPECIFIED TYPE: ICD-10-CM

## 2022-07-01 DIAGNOSIS — N94.819 VULVODYNIA: ICD-10-CM

## 2022-07-01 DIAGNOSIS — M05.9 SEROPOSITIVE RHEUMATOID ARTHRITIS: Primary | ICD-10-CM

## 2022-07-01 DIAGNOSIS — F32.A DEPRESSIVE DISORDER: ICD-10-CM

## 2022-07-01 DIAGNOSIS — G47.00 INSOMNIA, UNSPECIFIED TYPE: ICD-10-CM

## 2022-07-01 PROBLEM — K59.00 CONSTIPATION: Status: ACTIVE | Noted: 2022-07-01

## 2022-07-01 PROCEDURE — 99214 PR OFFICE/OUTPT VISIT, EST, LEVL IV, 30-39 MIN: ICD-10-PCS | Mod: S$GLB,,, | Performed by: INTERNAL MEDICINE

## 2022-07-01 PROCEDURE — 3078F DIAST BP <80 MM HG: CPT | Mod: CPTII,S$GLB,, | Performed by: INTERNAL MEDICINE

## 2022-07-01 PROCEDURE — 1159F MED LIST DOCD IN RCRD: CPT | Mod: CPTII,S$GLB,, | Performed by: INTERNAL MEDICINE

## 2022-07-01 PROCEDURE — 99999 PR PBB SHADOW E&M-EST. PATIENT-LVL IV: ICD-10-PCS | Mod: PBBFAC,,, | Performed by: INTERNAL MEDICINE

## 2022-07-01 PROCEDURE — 99999 PR PBB SHADOW E&M-EST. PATIENT-LVL IV: CPT | Mod: PBBFAC,,, | Performed by: INTERNAL MEDICINE

## 2022-07-01 PROCEDURE — 3008F PR BODY MASS INDEX (BMI) DOCUMENTED: ICD-10-PCS | Mod: CPTII,S$GLB,, | Performed by: INTERNAL MEDICINE

## 2022-07-01 PROCEDURE — 1160F PR REVIEW ALL MEDS BY PRESCRIBER/CLIN PHARMACIST DOCUMENTED: ICD-10-PCS | Mod: CPTII,S$GLB,, | Performed by: INTERNAL MEDICINE

## 2022-07-01 PROCEDURE — 3078F PR MOST RECENT DIASTOLIC BLOOD PRESSURE < 80 MM HG: ICD-10-PCS | Mod: CPTII,S$GLB,, | Performed by: INTERNAL MEDICINE

## 2022-07-01 PROCEDURE — 99214 OFFICE O/P EST MOD 30 MIN: CPT | Mod: S$GLB,,, | Performed by: INTERNAL MEDICINE

## 2022-07-01 PROCEDURE — 1160F RVW MEDS BY RX/DR IN RCRD: CPT | Mod: CPTII,S$GLB,, | Performed by: INTERNAL MEDICINE

## 2022-07-01 PROCEDURE — 1159F PR MEDICATION LIST DOCUMENTED IN MEDICAL RECORD: ICD-10-PCS | Mod: CPTII,S$GLB,, | Performed by: INTERNAL MEDICINE

## 2022-07-01 PROCEDURE — 3077F PR MOST RECENT SYSTOLIC BLOOD PRESSURE >= 140 MM HG: ICD-10-PCS | Mod: CPTII,S$GLB,, | Performed by: INTERNAL MEDICINE

## 2022-07-01 PROCEDURE — 3077F SYST BP >= 140 MM HG: CPT | Mod: CPTII,S$GLB,, | Performed by: INTERNAL MEDICINE

## 2022-07-01 PROCEDURE — 3008F BODY MASS INDEX DOCD: CPT | Mod: CPTII,S$GLB,, | Performed by: INTERNAL MEDICINE

## 2022-07-01 RX ORDER — TRAZODONE HYDROCHLORIDE 100 MG/1
100 TABLET ORAL NIGHTLY
COMMUNITY
Start: 2022-06-25 | End: 2023-03-08 | Stop reason: SDUPTHER

## 2022-07-01 RX ORDER — FLUTICASONE PROPIONATE 50 MCG
2 SPRAY, SUSPENSION (ML) NASAL 2 TIMES DAILY
COMMUNITY
Start: 2022-06-25

## 2022-07-01 RX ORDER — TIZANIDINE 2 MG/1
2 TABLET ORAL NIGHTLY
Qty: 30 TABLET | Refills: 5 | Status: SHIPPED | OUTPATIENT
Start: 2022-07-01 | End: 2022-11-08

## 2022-07-01 RX ORDER — PROMETHAZINE HYDROCHLORIDE 25 MG/1
25 TABLET ORAL
COMMUNITY
Start: 2021-11-18 | End: 2023-07-10 | Stop reason: SDUPTHER

## 2022-07-01 RX ORDER — FOLIC ACID 1 MG/1
1000 TABLET ORAL EVERY MORNING
COMMUNITY
Start: 2022-05-31 | End: 2022-07-01 | Stop reason: ALTCHOICE

## 2022-07-01 RX ORDER — DESVENLAFAXINE 100 MG/1
100 TABLET, EXTENDED RELEASE ORAL DAILY
Qty: 30 TABLET | Refills: 5 | Status: SHIPPED | OUTPATIENT
Start: 2022-07-01 | End: 2022-11-08 | Stop reason: SDUPTHER

## 2022-07-01 RX ORDER — BACLOFEN 10 MG/1
10 TABLET ORAL DAILY PRN
COMMUNITY
Start: 2022-03-02 | End: 2022-11-08 | Stop reason: SDUPTHER

## 2022-07-01 RX ORDER — MONTELUKAST SODIUM 10 MG/1
10 TABLET ORAL DAILY
COMMUNITY
Start: 2022-06-13

## 2022-07-01 RX ORDER — NALTREXONE HCL 100 %
POWDER (GRAM) MISCELLANEOUS
COMMUNITY
Start: 2022-01-28 | End: 2022-08-25

## 2022-07-01 RX ORDER — ALPRAZOLAM 0.5 MG/1
0.5 TABLET ORAL
COMMUNITY
Start: 2022-03-02 | End: 2022-11-08 | Stop reason: SDUPTHER

## 2022-07-01 RX ORDER — AZELASTINE 1 MG/ML
2 SPRAY, METERED NASAL 2 TIMES DAILY
COMMUNITY
Start: 2022-03-20

## 2022-07-01 RX ORDER — ZOLMITRIPTAN 5 MG/1
5 TABLET, FILM COATED ORAL
COMMUNITY
Start: 2022-05-31 | End: 2022-11-08 | Stop reason: SDUPTHER

## 2022-07-01 RX ORDER — DESVENLAFAXINE 100 MG/1
100 TABLET, EXTENDED RELEASE ORAL DAILY
COMMUNITY
Start: 2022-06-18 | End: 2022-07-01 | Stop reason: SDUPTHER

## 2022-07-01 NOTE — PROGRESS NOTES
"Subjective:       Patient ID: Padmini La is a 36 y.o. female.    Chief Complaint: Follow-up (Generalized pain)    The patient is complaining of joint pain involving the MCP PIP wrist elbow shoulders hips knees and ankles bilaterally.  The pain is 2/10 in intensity dull in quality and continuous.  That is associated with a morning stiffness lasting for more than 60 minutes.  Is also having difficulty maintaining a good night of sleep.  This has been associated with myalgias.  Muscle aches are 8/10 in intensity dull in quality and continuous.  They are associated with fatigue.  No fever no chills no others.      Review of Systems   Constitutional: Negative for appetite change, chills and fever.   HENT: Negative for congestion, ear pain, mouth sores, nosebleeds and trouble swallowing.    Eyes: Negative for photophobia and discharge.   Respiratory: Negative for chest tightness and shortness of breath.    Cardiovascular: Negative for chest pain.   Gastrointestinal: Negative for abdominal pain and vomiting.   Endocrine: Negative.    Genitourinary: Negative for hematuria.   Musculoskeletal:        As per HPI   Skin: Negative for rash.   Neurological: Negative for weakness.         Objective:   BP (!) 140/71 (BP Location: Right arm, Patient Position: Sitting, BP Method: Medium (Automatic))   Pulse 80   Temp 98.1 °F (36.7 °C) (Oral)   Resp 18   Ht 5' 2" (1.575 m)   Wt 59.3 kg (130 lb 12.8 oz)   SpO2 99%   BMI 23.92 kg/m²      Physical Exam   Constitutional: She is oriented to person, place, and time. She appears well-developed and well-nourished. No distress.   HENT:   Head: Normocephalic and atraumatic.   Right Ear: External ear normal.   Left Ear: External ear normal.   Eyes: Pupils are equal, round, and reactive to light.   Cardiovascular: Normal rate, regular rhythm and normal heart sounds.   Pulmonary/Chest: Breath sounds normal.   Abdominal: Soft. There is no abdominal tenderness.   Musculoskeletal:      Right " shoulder: Tenderness present.      Left shoulder: Tenderness present.      Right elbow: Tenderness present.      Left elbow: Tenderness present.      Right wrist: Tenderness present.      Left wrist: Tenderness present.      Cervical back: Neck supple.      Right hip: Tenderness present.      Left hip: Tenderness present.      Right knee: Tenderness present.      Left knee: Tenderness present.      Right ankle: Tenderness present.      Left ankle: Tenderness present.   Lymphadenopathy:     She has no cervical adenopathy.   Neurological: She is alert and oriented to person, place, and time. She displays normal reflexes. No cranial nerve deficit or sensory deficit. She exhibits normal muscle tone. Coordination normal.   Skin: No rash noted. No erythema.   Vitals reviewed.      Right Side Rheumatological Exam     The patient is tender to palpation of the shoulder, elbow, wrist, knee, 1st PIP, 1st MCP, 2nd PIP, 2nd MCP, 3rd PIP, 3rd MCP, 4th PIP, 4th MCP, 5th PIP, hip, ankle, 1st MTP, 2nd MTP, 3rd MTP, 4th MTP, 5th MTP, 1st toe IP, 2nd toe IP, 3rd toe IP, 4th toe IP and 5th toe IP    Left Side Rheumatological Exam     The patient is tender to palpation of the shoulder, elbow, wrist, knee, 1st PIP, 1st MCP, 2nd PIP, 2nd MCP, 3rd PIP, 3rd MCP, 4th PIP, 4th MCP, 5th PIP, 5th MCP, hip, ankle, 1st MTP, 2nd MTP, 3rd MTP, 4th MTP, 5th MTP, 1st toe IP, 2nd toe IP, 3rd toe IP, 4th toe IP and 5th toe IP.         Completed Fibromyalgia exam 18/18 tender points.  No data to display     Assessment:       1. Seropositive rheumatoid arthritis    2. Migraine without aura and with status migrainosus, not intractable    3. Depressive disorder    4. Vulvodynia    5. Pain in pelvis    6. Irritable bowel syndrome, unspecified type    7. Fibromyalgia    8. Insomnia, unspecified type            Plan:       Problem List Items Addressed This Visit        Neuro    Migraine without aura with status migrainosus    Relevant Medications     ACETAMINOPHEN-CAFFEINE ORAL    ALPRAZolam (XANAX) 0.5 MG tablet    azelastine (ASTELIN) 137 mcg (0.1 %) nasal spray    baclofen (LIORESAL) 10 MG tablet    fluticasone propionate (FLONASE) 50 mcg/actuation nasal spray    montelukast (SINGULAIR) 10 mg tablet    naltrexone HCl, bulk, 100 % Powd    promethazine (PHENERGAN) 25 MG tablet    traZODone (DESYREL) 100 MG tablet    ZOLMitriptan (ZOMIG) 5 MG tablet    INV TESTOSTERONE/ANASTROZOL OR PLACEBO PELLETS    desvenlafaxine succinate (PRISTIQ) 100 MG Tb24    tiZANidine (ZANAFLEX) 2 MG tablet       Psychiatric    Depressive disorder    Relevant Medications    ACETAMINOPHEN-CAFFEINE ORAL    ALPRAZolam (XANAX) 0.5 MG tablet    azelastine (ASTELIN) 137 mcg (0.1 %) nasal spray    baclofen (LIORESAL) 10 MG tablet    fluticasone propionate (FLONASE) 50 mcg/actuation nasal spray    montelukast (SINGULAIR) 10 mg tablet    naltrexone HCl, bulk, 100 % Powd    promethazine (PHENERGAN) 25 MG tablet    traZODone (DESYREL) 100 MG tablet    ZOLMitriptan (ZOMIG) 5 MG tablet    INV TESTOSTERONE/ANASTROZOL OR PLACEBO PELLETS    desvenlafaxine succinate (PRISTIQ) 100 MG Tb24    tiZANidine (ZANAFLEX) 2 MG tablet       Renal/    Vulvodynia    Relevant Medications    ACETAMINOPHEN-CAFFEINE ORAL    ALPRAZolam (XANAX) 0.5 MG tablet    azelastine (ASTELIN) 137 mcg (0.1 %) nasal spray    baclofen (LIORESAL) 10 MG tablet    fluticasone propionate (FLONASE) 50 mcg/actuation nasal spray    montelukast (SINGULAIR) 10 mg tablet    naltrexone HCl, bulk, 100 % Powd    promethazine (PHENERGAN) 25 MG tablet    traZODone (DESYREL) 100 MG tablet    ZOLMitriptan (ZOMIG) 5 MG tablet    INV TESTOSTERONE/ANASTROZOL OR PLACEBO PELLETS    desvenlafaxine succinate (PRISTIQ) 100 MG Tb24    tiZANidine (ZANAFLEX) 2 MG tablet       Immunology/Multi System    Seropositive rheumatoid arthritis - Primary    Relevant Medications    ACETAMINOPHEN-CAFFEINE ORAL    ALPRAZolam (XANAX) 0.5 MG tablet    azelastine (ASTELIN)  137 mcg (0.1 %) nasal spray    baclofen (LIORESAL) 10 MG tablet    fluticasone propionate (FLONASE) 50 mcg/actuation nasal spray    montelukast (SINGULAIR) 10 mg tablet    naltrexone HCl, bulk, 100 % Powd    promethazine (PHENERGAN) 25 MG tablet    traZODone (DESYREL) 100 MG tablet    ZOLMitriptan (ZOMIG) 5 MG tablet    INV TESTOSTERONE/ANASTROZOL OR PLACEBO PELLETS    desvenlafaxine succinate (PRISTIQ) 100 MG Tb24    tiZANidine (ZANAFLEX) 2 MG tablet       GI    Irritable bowel syndrome    Relevant Medications    ACETAMINOPHEN-CAFFEINE ORAL    ALPRAZolam (XANAX) 0.5 MG tablet    azelastine (ASTELIN) 137 mcg (0.1 %) nasal spray    baclofen (LIORESAL) 10 MG tablet    fluticasone propionate (FLONASE) 50 mcg/actuation nasal spray    montelukast (SINGULAIR) 10 mg tablet    naltrexone HCl, bulk, 100 % Powd    promethazine (PHENERGAN) 25 MG tablet    traZODone (DESYREL) 100 MG tablet    ZOLMitriptan (ZOMIG) 5 MG tablet    INV TESTOSTERONE/ANASTROZOL OR PLACEBO PELLETS    desvenlafaxine succinate (PRISTIQ) 100 MG Tb24    tiZANidine (ZANAFLEX) 2 MG tablet    Pain in pelvis    Relevant Medications    ACETAMINOPHEN-CAFFEINE ORAL    ALPRAZolam (XANAX) 0.5 MG tablet    azelastine (ASTELIN) 137 mcg (0.1 %) nasal spray    baclofen (LIORESAL) 10 MG tablet    fluticasone propionate (FLONASE) 50 mcg/actuation nasal spray    montelukast (SINGULAIR) 10 mg tablet    naltrexone HCl, bulk, 100 % Powd    promethazine (PHENERGAN) 25 MG tablet    traZODone (DESYREL) 100 MG tablet    ZOLMitriptan (ZOMIG) 5 MG tablet    INV TESTOSTERONE/ANASTROZOL OR PLACEBO PELLETS    desvenlafaxine succinate (PRISTIQ) 100 MG Tb24    tiZANidine (ZANAFLEX) 2 MG tablet       Orthopedic    Fibromyalgia    Relevant Medications    ACETAMINOPHEN-CAFFEINE ORAL    ALPRAZolam (XANAX) 0.5 MG tablet    azelastine (ASTELIN) 137 mcg (0.1 %) nasal spray    baclofen (LIORESAL) 10 MG tablet    fluticasone propionate (FLONASE) 50 mcg/actuation nasal spray    montelukast  (SINGULAIR) 10 mg tablet    naltrexone HCl, bulk, 100 % Powd    promethazine (PHENERGAN) 25 MG tablet    traZODone (DESYREL) 100 MG tablet    ZOLMitriptan (ZOMIG) 5 MG tablet    INV TESTOSTERONE/ANASTROZOL OR PLACEBO PELLETS    desvenlafaxine succinate (PRISTIQ) 100 MG Tb24    tiZANidine (ZANAFLEX) 2 MG tablet       Other    Insomnia    Relevant Medications    ACETAMINOPHEN-CAFFEINE ORAL    ALPRAZolam (XANAX) 0.5 MG tablet    azelastine (ASTELIN) 137 mcg (0.1 %) nasal spray    baclofen (LIORESAL) 10 MG tablet    fluticasone propionate (FLONASE) 50 mcg/actuation nasal spray    montelukast (SINGULAIR) 10 mg tablet    naltrexone HCl, bulk, 100 % Powd    promethazine (PHENERGAN) 25 MG tablet    traZODone (DESYREL) 100 MG tablet    ZOLMitriptan (ZOMIG) 5 MG tablet    INV TESTOSTERONE/ANASTROZOL OR PLACEBO PELLETS    desvenlafaxine succinate (PRISTIQ) 100 MG Tb24    tiZANidine (ZANAFLEX) 2 MG tablet

## 2022-07-11 DIAGNOSIS — M06.9 RHEUMATOID ARTHRITIS, INVOLVING UNSPECIFIED SITE, UNSPECIFIED WHETHER RHEUMATOID FACTOR PRESENT: Primary | ICD-10-CM

## 2022-07-11 RX ORDER — FOLIC ACID 1 MG/1
1000 TABLET ORAL DAILY
COMMUNITY
Start: 2022-07-08 | End: 2022-07-11 | Stop reason: SDUPTHER

## 2022-07-11 RX ORDER — FOLIC ACID 1 MG/1
1 TABLET ORAL DAILY
Qty: 30 TABLET | Refills: 0 | Status: SHIPPED | OUTPATIENT
Start: 2022-07-11 | End: 2022-07-13

## 2022-07-13 DIAGNOSIS — M06.9 RHEUMATOID ARTHRITIS, INVOLVING UNSPECIFIED SITE, UNSPECIFIED WHETHER RHEUMATOID FACTOR PRESENT: ICD-10-CM

## 2022-07-13 RX ORDER — METHOTREXATE 2.5 MG/1
TABLET ORAL
Qty: 20 TABLET | Refills: 5 | Status: SHIPPED | OUTPATIENT
Start: 2022-07-13 | End: 2022-08-25

## 2022-07-13 RX ORDER — FOLIC ACID 1 MG/1
TABLET ORAL
Qty: 90 TABLET | Refills: 3 | Status: SHIPPED | OUTPATIENT
Start: 2022-07-13 | End: 2022-08-25

## 2022-08-25 ENCOUNTER — OFFICE VISIT (OUTPATIENT)
Dept: GASTROENTEROLOGY | Facility: CLINIC | Age: 37
End: 2022-08-25
Payer: COMMERCIAL

## 2022-08-25 VITALS
HEART RATE: 68 BPM | SYSTOLIC BLOOD PRESSURE: 127 MMHG | DIASTOLIC BLOOD PRESSURE: 86 MMHG | HEIGHT: 63 IN | BODY MASS INDEX: 23.89 KG/M2 | RESPIRATION RATE: 16 BRPM | TEMPERATURE: 98 F | WEIGHT: 134.81 LBS | OXYGEN SATURATION: 99 %

## 2022-08-25 DIAGNOSIS — K58.1 IRRITABLE BOWEL SYNDROME WITH CONSTIPATION: Primary | ICD-10-CM

## 2022-08-25 PROCEDURE — 99215 OFFICE O/P EST HI 40 MIN: CPT | Mod: PBBFAC | Performed by: NURSE PRACTITIONER

## 2022-08-25 PROCEDURE — 1159F PR MEDICATION LIST DOCUMENTED IN MEDICAL RECORD: ICD-10-PCS | Mod: CPTII,,, | Performed by: NURSE PRACTITIONER

## 2022-08-25 PROCEDURE — 3008F PR BODY MASS INDEX (BMI) DOCUMENTED: ICD-10-PCS | Mod: CPTII,,, | Performed by: NURSE PRACTITIONER

## 2022-08-25 PROCEDURE — 1160F RVW MEDS BY RX/DR IN RCRD: CPT | Mod: CPTII,,, | Performed by: NURSE PRACTITIONER

## 2022-08-25 PROCEDURE — 3074F PR MOST RECENT SYSTOLIC BLOOD PRESSURE < 130 MM HG: ICD-10-PCS | Mod: CPTII,,, | Performed by: NURSE PRACTITIONER

## 2022-08-25 PROCEDURE — 3079F PR MOST RECENT DIASTOLIC BLOOD PRESSURE 80-89 MM HG: ICD-10-PCS | Mod: CPTII,,, | Performed by: NURSE PRACTITIONER

## 2022-08-25 PROCEDURE — 99214 OFFICE O/P EST MOD 30 MIN: CPT | Mod: S$PBB,,, | Performed by: NURSE PRACTITIONER

## 2022-08-25 PROCEDURE — 3074F SYST BP LT 130 MM HG: CPT | Mod: CPTII,,, | Performed by: NURSE PRACTITIONER

## 2022-08-25 PROCEDURE — 3079F DIAST BP 80-89 MM HG: CPT | Mod: CPTII,,, | Performed by: NURSE PRACTITIONER

## 2022-08-25 PROCEDURE — 3008F BODY MASS INDEX DOCD: CPT | Mod: CPTII,,, | Performed by: NURSE PRACTITIONER

## 2022-08-25 PROCEDURE — 1160F PR REVIEW ALL MEDS BY PRESCRIBER/CLIN PHARMACIST DOCUMENTED: ICD-10-PCS | Mod: CPTII,,, | Performed by: NURSE PRACTITIONER

## 2022-08-25 PROCEDURE — 99214 PR OFFICE/OUTPT VISIT, EST, LEVL IV, 30-39 MIN: ICD-10-PCS | Mod: S$PBB,,, | Performed by: NURSE PRACTITIONER

## 2022-08-25 PROCEDURE — 1159F MED LIST DOCD IN RCRD: CPT | Mod: CPTII,,, | Performed by: NURSE PRACTITIONER

## 2022-08-25 RX ORDER — CYCLOBENZAPRINE HCL 10 MG
10 TABLET ORAL
COMMUNITY
Start: 2022-01-06 | End: 2022-08-25

## 2022-08-25 RX ORDER — HYOSCYAMINE SULFATE 0.125 MG
125 TABLET ORAL EVERY 6 HOURS PRN
Qty: 60 TABLET | Refills: 2 | Status: SHIPPED | OUTPATIENT
Start: 2022-08-25 | End: 2023-03-08

## 2022-08-25 RX ORDER — TOPIRAMATE 25 MG/1
50 TABLET ORAL NIGHTLY
COMMUNITY
Start: 2022-07-22 | End: 2022-11-08

## 2022-08-25 NOTE — PROGRESS NOTES
Subjective:       Patient ID: Padmini La is a 37 y.o. female.    Chief Complaint: Irritable Bowel Syndrome (With constipation)    This 37-year-old  female with a history of fibromyalgia, migraine headaches, and chronic constipation presents unaccompanied for a follow-up visit.  She was initially seen in the clinic July 11, 2019, referred for IBS at the time.  She reported a long-standing history of chronic constipation that seemed to have worsened over the past few weeks.  She would typically have 1-2 bowel movements per week.  She reported straining and sitting on the toilet for long periods of time.  She had noted gas and bloating when she went more than 2 to 3 days without a bowel movement and with intake of gluten and dairy products.  She also reported intermittent generalized abdominal pain that was relieved with defecation majority of the time.  She had tried OTC MiraLAX, milk of magnesia, and senna with minimal relief noted.  She reported being tried on Linzess 72 mcg daily for approximately 1 year with moderate relief noted.  She was taking a daily probiotic.  Her appetite was good.  She reported difficulty losing weight over the past several months despite dietary and lifestyle modifications.  She denied nocturnal symptoms, fever, chills, nausea, vomiting, odynophagia, dysphagia, acid reflux, heartburn, belching, or early satiety.  She denied melena, hematochezia, fecal urgency, fecal incontinence, or pain with defecation.    She was recommended lifestyle and dietary modifications, as well as increasing fluid and fiber intake.  I advised that she continue her daily probiotic.  She was prescribed hyoscyamine 0.125 mg every 8 hours as needed for abdominal pain, Linzess 145 mcg daily, and simethicone 250 mg twice daily as needed for gas.  Laboratory results July 17, 2019 revealed unremarkable CMP, ferritin, hemoglobin A1c, vitamin D, TSH, and CBC.  Abdominal x-ray July 17, 2019 revealed findings  consistent with constipation.  She was recommended an initial colon cleanse with 1-2 bottles of magnesium citrate followed by maintenance with previously recommended regimen.    She was seen in the clinic for a follow-up visit October 16, 2019.  She reported intermittent acid reflux and was taking OTC Tums approximately once weekly with moderate relief noted.  She had occasional bloating and belching.  She continued to take a daily probiotic and simethicone as needed with moderate relief noted.  Her appetite was good and her weight was stable.  She denied nocturnal symptoms, fever, chills, nausea, vomiting, odynophagia, dysphagia, belching, or early satiety.  She had intermittent lower abdominal cramps that were typically relieved with defecation and Levsin as needed.  She reported having to take Levsin approximately once monthly.  Her bowel regimen was consistent of a daily probiotic, Linzess 145 mcg daily, and senna.  She would typically have one bowel movement daily, but reported not feeling completely emptied at times.  She denied melena, hematochezia, fecal urgency, fecal incontinence, or pain with defecation.  She had decreased intake of dairy and bread.    She was recommended to increase Linzess to 290 mcg daily.  She was provided lifestyle and dietary modifications and encouraged to continue Levsin as needed and daily probiotic.  EGD was ordered, which she completed with Dr. Daley December 30, 2019 and resulted normal.    When seen in January 2020, she continued to take her daily probiotic, digestive enzymes, and Linzess 290 mcg daily.  Symptoms improved on her current medication regimen.  She typically had 1-2 bowel movements per day without melena, hematochezia, fecal urgency, fecal incontinence, or pain with defecation.  She would occasionally have bloating and gas.  Her appetite was good and her weight was stable.  She denied fever, chills, nausea, vomiting, odynophagia, dysphagia, acid reflux,  heartburn, belching, or early satiety.  She was recommended lifestyle and dietary modifications and advised to continue her medication regimen as directed.    When evaluated in June 2020, she continued to take Linzess 290 mcg daily, as well as a daily probiotic.  She also started taking senna again 2 days prior.  She reported decreased effectiveness of Linzess over the past 1-2 months and not feeling completely evacuated with defecation.  She would occasionally strain to have a bowel movement.  She had intermittent lower abdominal cramping that was relieved with defecation.  She had occasional bloating and gas.  Her appetite was good and her weight was stable.  She denied nocturnal symptoms, fever, chills, nausea, vomiting, odynophagia, dysphagia, acid reflux, heartburn, belching, or early satiety.  She denied melena, hematochezia, fecal urgency, fecal incontinence, or pain with defecation.  Laboratory results May 21, 2020 revealed GFR 87, total protein 8.4, globulin 4.50, and otherwise unremarkable CMP, free T4, TSH, CBC, CRP, and ESR.  She was advised to continue her current treatment regimen as directed    She underwent colonoscopy with Dr. Daley November 25, 2020 with findings of nonbleeding internal hemorrhoids and otherwise normal exam with a recommended recall of 10 years.    She was seen for a follow-up visit May 3, 2021.  She reported 1 bowel movement every 1 to 2 days as long as she was consistent with her whole 30 diet.  She did not always feel completely evacuated.  She avoided dairy and poultry.  She had occasional abdominal spasms and cramping that was relieved with Levsin as needed.  She had to take 2 Levsin tablets yesterday.  Her appetite was good and her weight was stable.  She denied nocturnal symptoms, fever, chills, nausea, vomiting, odynophagia, dysphagia, acid reflux, heartburn, early satiety, melena, hematochezia, fecal urgency, fecal incontinence, or pain with defecation.  She was advised  to start Motegrity 2 mg daily.    When last evaluated via telemedicine visit September 3, 2021, she reported feeling well. She remained consistent on her whole 30 diet and avoided dairy and poultry. She had not had to take Levsin. She was drinking probiotic drinks and taking Trulance for constipation with good relief noted. She denied any problems at this time.    Today, she presents for a follow-up visit. She reports minimal change in symptoms from previous telemedicine visit.    She was evaluated by Dr. Montgomery several years ago and treated for IBS.  She also reports being tested for celiac disease in the past, which resulted negative.  She denies ever having a colonoscopy done.  She takes ibuprofen as needed approximately once weekly for headaches and denies use of blood thinners.  She denies tobacco or alcohol use.  Her brother and uncle have a history of Crohn's disease.    Review of patient's allergies indicates:   Allergen Reactions    Lactase      Other reaction(s): Inflammation , bloating , cramps    Sulfabenzamide      Cutaneous eruption    Sulfacetamide      Cutaneous eruption    Sulfathiazole      Cutaneous eruption    Egg      Other reaction(s): Abdominal bloating    Sulfa (sulfonamide antibiotics) Rash     Past Medical History:   Diagnosis Date    Allergy     Fibromyalgia     IBS (irritable bowel syndrome)      Past Surgical History:   Procedure Laterality Date    COLONOSCOPY      UPPER GASTROINTESTINAL ENDOSCOPY       Family History:   family history includes Crohn's disease in her brother; Heart disease in her father and mother; Hypertension in her father, mother, and sister.    Social History:    reports that she has never smoked. She has never used smokeless tobacco. She reports previous alcohol use. She reports that she does not use drugs.    Review of Systems  Negative except as noted in the HPI.      Objective:      Physical Exam  Constitutional:       Appearance: Normal appearance.    HENT:      Head: Normocephalic.      Mouth/Throat:      Mouth: Mucous membranes are moist.   Eyes:      Extraocular Movements: Extraocular movements intact.      Conjunctiva/sclera: Conjunctivae normal.      Pupils: Pupils are equal, round, and reactive to light.   Cardiovascular:      Rate and Rhythm: Normal rate and regular rhythm.      Pulses: Normal pulses.      Heart sounds: Normal heart sounds.   Pulmonary:      Effort: Pulmonary effort is normal.      Breath sounds: Normal breath sounds.   Abdominal:      General: Bowel sounds are normal.      Palpations: Abdomen is soft.   Musculoskeletal:         General: Normal range of motion.      Cervical back: Normal range of motion and neck supple.   Skin:     General: Skin is warm and dry.   Neurological:      General: No focal deficit present.      Mental Status: She is alert and oriented to person, place, and time.   Psychiatric:         Mood and Affect: Mood normal.         Behavior: Behavior normal.         Thought Content: Thought content normal.         Judgment: Judgment normal.         Home Medications:     Current Outpatient Medications   Medication Sig    ACETAMINOPHEN-CAFFEINE ORAL Take 1 tablet by mouth as needed.    ALPRAZolam (XANAX) 0.5 MG tablet Take 0.5 mg by mouth as needed.    azelastine (ASTELIN) 137 mcg (0.1 %) nasal spray 2 sprays by Nasal route 2 (two) times daily.    baclofen (LIORESAL) 10 MG tablet Take 10 mg by mouth daily as needed.    desvenlafaxine succinate (PRISTIQ) 100 MG Tb24 Take 1 tablet (100 mg total) by mouth once daily.    fluticasone propionate (FLONASE) 50 mcg/actuation nasal spray 2 sprays by Each Nostril route 2 (two) times daily.    INV TESTOSTERONE/ANASTROZOL OR PLACEBO PELLETS Inject 1 Pellet into the skin every 3 (three) months. FOR INVESTIGATIONAL USE ONLY    montelukast (SINGULAIR) 10 mg tablet Take 10 mg by mouth once daily.    promethazine (PHENERGAN) 25 MG tablet Take 25 mg by mouth as needed.     tiZANidine (ZANAFLEX) 2 MG tablet Take 1 tablet (2 mg total) by mouth nightly.    topiramate (TOPAMAX) 25 MG tablet Take 50 mg by mouth every evening.    traZODone (DESYREL) 100 MG tablet Take 100 mg by mouth nightly.    ZOLMitriptan (ZOMIG) 5 MG tablet Take 5 mg by mouth as needed.     Assessment/Plan:     Problem List Items Addressed This Visit        GI    Irritable bowel syndrome with constipation - Primary     Lifestyle and dietary modifications provided  Continue daily probiotic as directed  Continue Trulance and Levsin as directed  Call with updates   Follow-up clinic visit with NP in 1 year           Relevant Medications    plecanatide (TRULANCE) 3 mg Tab    hyoscyamine (ANASPAZ,LEVSIN) 0.125 mg Tab

## 2022-08-25 NOTE — ASSESSMENT & PLAN NOTE
Lifestyle and dietary modifications provided  Continue daily probiotic as directed  Continue Trulance and Levsin as directed  Call with updates   Follow-up clinic visit with NP in 1 year

## 2022-09-21 ENCOUNTER — HISTORICAL (OUTPATIENT)
Dept: ADMINISTRATIVE | Facility: HOSPITAL | Age: 37
End: 2022-09-21
Payer: COMMERCIAL

## 2022-11-08 ENCOUNTER — OFFICE VISIT (OUTPATIENT)
Dept: RHEUMATOLOGY | Facility: CLINIC | Age: 37
End: 2022-11-08
Payer: COMMERCIAL

## 2022-11-08 VITALS
HEART RATE: 80 BPM | HEIGHT: 63 IN | BODY MASS INDEX: 24.66 KG/M2 | WEIGHT: 139.19 LBS | OXYGEN SATURATION: 99 % | RESPIRATION RATE: 16 BRPM | TEMPERATURE: 98 F | SYSTOLIC BLOOD PRESSURE: 121 MMHG | DIASTOLIC BLOOD PRESSURE: 85 MMHG

## 2022-11-08 DIAGNOSIS — K58.1 IRRITABLE BOWEL SYNDROME WITH CONSTIPATION: ICD-10-CM

## 2022-11-08 DIAGNOSIS — G47.00 INSOMNIA, UNSPECIFIED TYPE: ICD-10-CM

## 2022-11-08 DIAGNOSIS — K58.9 IRRITABLE BOWEL SYNDROME, UNSPECIFIED TYPE: ICD-10-CM

## 2022-11-08 DIAGNOSIS — G43.001 MIGRAINE WITHOUT AURA AND WITH STATUS MIGRAINOSUS, NOT INTRACTABLE: ICD-10-CM

## 2022-11-08 DIAGNOSIS — R10.2 PAIN IN PELVIS: ICD-10-CM

## 2022-11-08 DIAGNOSIS — N94.819 VULVODYNIA: ICD-10-CM

## 2022-11-08 DIAGNOSIS — M79.7 FIBROMYALGIA: ICD-10-CM

## 2022-11-08 DIAGNOSIS — F32.A DEPRESSIVE DISORDER: ICD-10-CM

## 2022-11-08 DIAGNOSIS — M05.9 SEROPOSITIVE RHEUMATOID ARTHRITIS: Primary | ICD-10-CM

## 2022-11-08 PROCEDURE — 3079F DIAST BP 80-89 MM HG: CPT | Mod: CPTII,S$GLB,, | Performed by: INTERNAL MEDICINE

## 2022-11-08 PROCEDURE — 3008F PR BODY MASS INDEX (BMI) DOCUMENTED: ICD-10-PCS | Mod: CPTII,S$GLB,, | Performed by: INTERNAL MEDICINE

## 2022-11-08 PROCEDURE — 3079F PR MOST RECENT DIASTOLIC BLOOD PRESSURE 80-89 MM HG: ICD-10-PCS | Mod: CPTII,S$GLB,, | Performed by: INTERNAL MEDICINE

## 2022-11-08 PROCEDURE — 99999 PR PBB SHADOW E&M-EST. PATIENT-LVL IV: ICD-10-PCS | Mod: PBBFAC,,, | Performed by: INTERNAL MEDICINE

## 2022-11-08 PROCEDURE — 99214 PR OFFICE/OUTPT VISIT, EST, LEVL IV, 30-39 MIN: ICD-10-PCS | Mod: S$GLB,,, | Performed by: INTERNAL MEDICINE

## 2022-11-08 PROCEDURE — 3074F SYST BP LT 130 MM HG: CPT | Mod: CPTII,S$GLB,, | Performed by: INTERNAL MEDICINE

## 2022-11-08 PROCEDURE — 99999 PR PBB SHADOW E&M-EST. PATIENT-LVL IV: CPT | Mod: PBBFAC,,, | Performed by: INTERNAL MEDICINE

## 2022-11-08 PROCEDURE — 3074F PR MOST RECENT SYSTOLIC BLOOD PRESSURE < 130 MM HG: ICD-10-PCS | Mod: CPTII,S$GLB,, | Performed by: INTERNAL MEDICINE

## 2022-11-08 PROCEDURE — 1159F PR MEDICATION LIST DOCUMENTED IN MEDICAL RECORD: ICD-10-PCS | Mod: CPTII,S$GLB,, | Performed by: INTERNAL MEDICINE

## 2022-11-08 PROCEDURE — 99214 OFFICE O/P EST MOD 30 MIN: CPT | Mod: S$GLB,,, | Performed by: INTERNAL MEDICINE

## 2022-11-08 PROCEDURE — 3008F BODY MASS INDEX DOCD: CPT | Mod: CPTII,S$GLB,, | Performed by: INTERNAL MEDICINE

## 2022-11-08 PROCEDURE — 1159F MED LIST DOCD IN RCRD: CPT | Mod: CPTII,S$GLB,, | Performed by: INTERNAL MEDICINE

## 2022-11-08 RX ORDER — DESVENLAFAXINE SUCCINATE 50 MG/1
100 TABLET, EXTENDED RELEASE ORAL DAILY
Qty: 180 TABLET | Refills: 11 | Status: SHIPPED | OUTPATIENT
Start: 2022-11-08 | End: 2023-03-08

## 2022-11-08 RX ORDER — ALPRAZOLAM 0.5 MG/1
0.5 TABLET ORAL
Qty: 90 TABLET | Refills: 5 | Status: SHIPPED | OUTPATIENT
Start: 2022-11-08 | End: 2023-03-08 | Stop reason: SDUPTHER

## 2022-11-08 RX ORDER — BACLOFEN 10 MG/1
10 TABLET ORAL NIGHTLY
Qty: 90 TABLET | Refills: 5 | Status: SHIPPED | OUTPATIENT
Start: 2022-11-08 | End: 2023-03-08 | Stop reason: SDUPTHER

## 2022-11-08 RX ORDER — ZOLMITRIPTAN 5 MG/1
5 TABLET, FILM COATED ORAL
Qty: 30 TABLET | Refills: 5 | Status: SHIPPED | OUTPATIENT
Start: 2022-11-08

## 2022-11-08 RX ORDER — DESVENLAFAXINE 100 MG/1
100 TABLET, EXTENDED RELEASE ORAL DAILY
Qty: 30 TABLET | Refills: 5 | Status: SHIPPED | OUTPATIENT
Start: 2022-11-08 | End: 2023-03-08

## 2022-11-08 NOTE — PROGRESS NOTES
"Subjective:       Patient ID: Padmini La is a 37 y.o. female.    Chief Complaint: Follow-up (Patient is a 4 month f/u for RA.)    The patient is complaining of joint pain involving the MCP PIP wrist elbow shoulders hips knees and ankles bilaterally.  The pain is 2/10 in intensity dull in quality and continuous.  That is associated with a morning stiffness lasting for more than 60 minutes.  Is also having difficulty maintaining a good night of sleep.  This has been associated with myalgias.  Muscle aches are 2/10 in intensity dull in quality and continuous.  They are associated with fatigue.  No fever no chills no others.      Review of Systems   Constitutional:  Negative for appetite change, chills and fever.   HENT:  Negative for congestion, ear pain, mouth sores, nosebleeds and trouble swallowing.    Eyes:  Negative for photophobia and discharge.   Respiratory:  Negative for chest tightness and shortness of breath.    Cardiovascular:  Negative for chest pain.   Gastrointestinal:  Negative for abdominal pain and vomiting.   Endocrine: Negative.    Genitourinary:  Negative for hematuria.   Musculoskeletal:         As per HPI   Skin:  Negative for rash.   Neurological:  Negative for weakness.       Objective:   /85 (BP Location: Left arm, Patient Position: Sitting, BP Method: Medium (Automatic))   Pulse 80   Temp 97.8 °F (36.6 °C) (Oral)   Resp 16   Ht 5' 2.6" (1.59 m)   Wt 63.1 kg (139 lb 3.2 oz)   SpO2 99%   BMI 24.97 kg/m²      Physical Exam   Constitutional: She is oriented to person, place, and time. She appears well-developed and well-nourished. No distress.   HENT:   Head: Normocephalic and atraumatic.   Right Ear: External ear normal.   Left Ear: External ear normal.   Eyes: Pupils are equal, round, and reactive to light.   Cardiovascular: Normal rate, regular rhythm and normal heart sounds.   Pulmonary/Chest: Breath sounds normal.   Abdominal: Soft. There is no abdominal tenderness. "   Musculoskeletal:      Right shoulder: Normal.      Left shoulder: Normal.      Right elbow: Normal.      Left elbow: Normal.      Right wrist: Normal.      Left wrist: Normal.      Cervical back: Neck supple.      Right hip: Normal.      Left hip: Normal.      Right knee: Normal.      Left knee: Normal.   Lymphadenopathy:     She has no cervical adenopathy.   Neurological: She is alert and oriented to person, place, and time. She displays normal reflexes. No cranial nerve deficit or sensory deficit. She exhibits normal muscle tone. Coordination normal.   Skin: No rash noted. No erythema.   Vitals reviewed.      Right Side Rheumatological Exam     Examination finds the shoulder, elbow, wrist, knee, 1st PIP, 1st MCP, 2nd PIP, 2nd MCP, 3rd PIP, 3rd MCP, 4th PIP, 4th MCP, 5th PIP, 5th MCP, temporomandibular, hip, ankle, 1st MTP, 2nd MTP, 3rd MTP, 4th MTP and 5th MTP normal.    Left Side Rheumatological Exam     Examination finds the shoulder, elbow, wrist, knee, 1st PIP, 1st MCP, 2nd PIP, 2nd MCP, 3rd PIP, 3rd MCP, 4th PIP, 4th MCP, 5th PIP, 5th MCP, temporomandibular, hip, ankle, 1st MTP, 2nd MTP, 3rd MTP, 4th MTP and 5th MTP normal.       Completed Fibromyalgia exam 11/18 tender points.  No data to display     Assessment:       1. Seropositive rheumatoid arthritis    2. Fibromyalgia    3. Insomnia, unspecified type    4. Migraine without aura and with status migrainosus, not intractable    5. Irritable bowel syndrome with constipation    6. Depressive disorder    7. Vulvodynia    8. Pain in pelvis    9. Irritable bowel syndrome, unspecified type            Medication List with Changes/Refills   New Medications    DESVENLAFAXINE SUCCINATE (PRISTIQ) 50 MG TB24    Take 2 tablets (100 mg total) by mouth once daily.       Start Date: 11/8/2022 End Date: 11/8/2023   Current Medications    ACETAMINOPHEN-CAFFEINE ORAL    Take 1 tablet by mouth as needed.       Start Date: 12/2/2021 End Date: --    AZELASTINE (ASTELIN) 137  MCG (0.1 %) NASAL SPRAY    2 sprays by Nasal route 2 (two) times daily.       Start Date: 3/20/2022 End Date: --    FLUTICASONE PROPIONATE (FLONASE) 50 MCG/ACTUATION NASAL SPRAY    2 sprays by Each Nostril route 2 (two) times daily.       Start Date: 6/25/2022 End Date: --    HYOSCYAMINE (ANASPAZ,LEVSIN) 0.125 MG TAB    Take 1 tablet (125 mcg total) by mouth every 6 (six) hours as needed (abdominal pain).       Start Date: 8/25/2022 End Date: 9/24/2022    INV TESTOSTERONE/ANASTROZOL OR PLACEBO PELLETS    Inject 1 Pellet into the skin every 3 (three) months. FOR INVESTIGATIONAL USE ONLY       Start Date: --        End Date: --    MONTELUKAST (SINGULAIR) 10 MG TABLET    Take 10 mg by mouth once daily.       Start Date: 6/13/2022 End Date: --    PROMETHAZINE (PHENERGAN) 25 MG TABLET    Take 25 mg by mouth as needed.       Start Date: 11/18/2021End Date: --    TRAZODONE (DESYREL) 100 MG TABLET    Take 100 mg by mouth nightly.       Start Date: 6/25/2022 End Date: --   Changed and/or Refilled Medications    Modified Medication Previous Medication    ALPRAZOLAM (XANAX) 0.5 MG TABLET ALPRAZolam (XANAX) 0.5 MG tablet       Take 1 tablet (0.5 mg total) by mouth as needed for Anxiety.    Take 0.5 mg by mouth as needed.       Start Date: 11/8/2022 End Date: --    Start Date: 3/2/2022  End Date: 11/8/2022    BACLOFEN (LIORESAL) 10 MG TABLET baclofen (LIORESAL) 10 MG tablet       Take 1 tablet (10 mg total) by mouth every evening.    Take 10 mg by mouth daily as needed.       Start Date: 11/8/2022 End Date: --    Start Date: 3/2/2022  End Date: 11/8/2022    DESVENLAFAXINE SUCCINATE (PRISTIQ) 100 MG TB24 desvenlafaxine succinate (PRISTIQ) 100 MG Tb24       Take 1 tablet (100 mg total) by mouth once daily.    Take 1 tablet (100 mg total) by mouth once daily.       Start Date: 11/8/2022 End Date: --    Start Date: 7/1/2022  End Date: 11/8/2022    ZOLMITRIPTAN (ZOMIG) 5 MG TABLET ZOLMitriptan (ZOMIG) 5 MG tablet       Take 1 tablet  (5 mg total) by mouth as needed for Migraine (1 pill when the migraine starts, may repeat in 2 h if needed. max 2 in 24 hours.).    Take 5 mg by mouth as needed.       Start Date: 11/8/2022 End Date: --    Start Date: 5/31/2022 End Date: 11/8/2022   Discontinued Medications    TIZANIDINE (ZANAFLEX) 2 MG TABLET    Take 1 tablet (2 mg total) by mouth nightly.       Start Date: 7/1/2022  End Date: 11/8/2022    TOPIRAMATE (TOPAMAX) 25 MG TABLET    Take 50 mg by mouth every evening.       Start Date: 7/22/2022 End Date: 11/8/2022         Plan:       Problem List Items Addressed This Visit          Neuro    Migraine without aura with status migrainosus    Relevant Medications    ALPRAZolam (XANAX) 0.5 MG tablet    baclofen (LIORESAL) 10 MG tablet    desvenlafaxine succinate (PRISTIQ) 100 MG Tb24    ZOLMitriptan (ZOMIG) 5 MG tablet    desvenlafaxine succinate (PRISTIQ) 50 MG Tb24       Psychiatric    Depressive disorder    Relevant Medications    ALPRAZolam (XANAX) 0.5 MG tablet    baclofen (LIORESAL) 10 MG tablet    desvenlafaxine succinate (PRISTIQ) 100 MG Tb24    ZOLMitriptan (ZOMIG) 5 MG tablet    desvenlafaxine succinate (PRISTIQ) 50 MG Tb24       Renal/    Vulvodynia    Relevant Medications    ALPRAZolam (XANAX) 0.5 MG tablet    baclofen (LIORESAL) 10 MG tablet    desvenlafaxine succinate (PRISTIQ) 100 MG Tb24    ZOLMitriptan (ZOMIG) 5 MG tablet    desvenlafaxine succinate (PRISTIQ) 50 MG Tb24       Immunology/Multi System    Seropositive rheumatoid arthritis - Primary    Relevant Medications    ALPRAZolam (XANAX) 0.5 MG tablet    baclofen (LIORESAL) 10 MG tablet    desvenlafaxine succinate (PRISTIQ) 100 MG Tb24    ZOLMitriptan (ZOMIG) 5 MG tablet    desvenlafaxine succinate (PRISTIQ) 50 MG Tb24       GI    Irritable bowel syndrome with constipation    Relevant Medications    ALPRAZolam (XANAX) 0.5 MG tablet    baclofen (LIORESAL) 10 MG tablet    desvenlafaxine succinate (PRISTIQ) 100 MG Tb24    ZOLMitriptan  (ZOMIG) 5 MG tablet    desvenlafaxine succinate (PRISTIQ) 50 MG Tb24    Pain in pelvis    Relevant Medications    ALPRAZolam (XANAX) 0.5 MG tablet    baclofen (LIORESAL) 10 MG tablet    desvenlafaxine succinate (PRISTIQ) 100 MG Tb24    ZOLMitriptan (ZOMIG) 5 MG tablet    desvenlafaxine succinate (PRISTIQ) 50 MG Tb24       Orthopedic    Fibromyalgia    Relevant Medications    ALPRAZolam (XANAX) 0.5 MG tablet    baclofen (LIORESAL) 10 MG tablet    desvenlafaxine succinate (PRISTIQ) 100 MG Tb24    ZOLMitriptan (ZOMIG) 5 MG tablet    desvenlafaxine succinate (PRISTIQ) 50 MG Tb24       Other    Insomnia    Relevant Medications    ALPRAZolam (XANAX) 0.5 MG tablet    baclofen (LIORESAL) 10 MG tablet    desvenlafaxine succinate (PRISTIQ) 100 MG Tb24    ZOLMitriptan (ZOMIG) 5 MG tablet    desvenlafaxine succinate (PRISTIQ) 50 MG Tb24     Other Visit Diagnoses       Irritable bowel syndrome, unspecified type        Relevant Medications    ALPRAZolam (XANAX) 0.5 MG tablet    baclofen (LIORESAL) 10 MG tablet    desvenlafaxine succinate (PRISTIQ) 100 MG Tb24    ZOLMitriptan (ZOMIG) 5 MG tablet    desvenlafaxine succinate (PRISTIQ) 50 MG Tb24

## 2022-12-01 ENCOUNTER — DOCUMENTATION ONLY (OUTPATIENT)
Dept: PRIMARY CARE CLINIC | Facility: CLINIC | Age: 37
End: 2022-12-01
Payer: COMMERCIAL

## 2023-03-08 ENCOUNTER — OFFICE VISIT (OUTPATIENT)
Dept: RHEUMATOLOGY | Facility: CLINIC | Age: 38
End: 2023-03-08
Payer: COMMERCIAL

## 2023-03-08 VITALS
BODY MASS INDEX: 26.13 KG/M2 | HEART RATE: 72 BPM | SYSTOLIC BLOOD PRESSURE: 127 MMHG | WEIGHT: 142 LBS | HEIGHT: 62 IN | OXYGEN SATURATION: 99 % | DIASTOLIC BLOOD PRESSURE: 85 MMHG | TEMPERATURE: 99 F

## 2023-03-08 DIAGNOSIS — K58.1 IRRITABLE BOWEL SYNDROME WITH CONSTIPATION: ICD-10-CM

## 2023-03-08 DIAGNOSIS — M79.7 FIBROMYALGIA: ICD-10-CM

## 2023-03-08 DIAGNOSIS — M05.9 SEROPOSITIVE RHEUMATOID ARTHRITIS: ICD-10-CM

## 2023-03-08 DIAGNOSIS — F32.A DEPRESSIVE DISORDER: ICD-10-CM

## 2023-03-08 DIAGNOSIS — G43.001 MIGRAINE WITHOUT AURA AND WITH STATUS MIGRAINOSUS, NOT INTRACTABLE: Primary | ICD-10-CM

## 2023-03-08 DIAGNOSIS — G47.00 INSOMNIA, UNSPECIFIED TYPE: ICD-10-CM

## 2023-03-08 PROCEDURE — 3008F PR BODY MASS INDEX (BMI) DOCUMENTED: ICD-10-PCS | Mod: CPTII,S$GLB,, | Performed by: INTERNAL MEDICINE

## 2023-03-08 PROCEDURE — 99214 PR OFFICE/OUTPT VISIT, EST, LEVL IV, 30-39 MIN: ICD-10-PCS | Mod: S$GLB,,, | Performed by: INTERNAL MEDICINE

## 2023-03-08 PROCEDURE — 1159F MED LIST DOCD IN RCRD: CPT | Mod: CPTII,S$GLB,, | Performed by: INTERNAL MEDICINE

## 2023-03-08 PROCEDURE — 1159F PR MEDICATION LIST DOCUMENTED IN MEDICAL RECORD: ICD-10-PCS | Mod: CPTII,S$GLB,, | Performed by: INTERNAL MEDICINE

## 2023-03-08 PROCEDURE — 99214 OFFICE O/P EST MOD 30 MIN: CPT | Mod: S$GLB,,, | Performed by: INTERNAL MEDICINE

## 2023-03-08 PROCEDURE — 99999 PR PBB SHADOW E&M-EST. PATIENT-LVL III: ICD-10-PCS | Mod: PBBFAC,,, | Performed by: INTERNAL MEDICINE

## 2023-03-08 PROCEDURE — 3079F DIAST BP 80-89 MM HG: CPT | Mod: CPTII,S$GLB,, | Performed by: INTERNAL MEDICINE

## 2023-03-08 PROCEDURE — 3008F BODY MASS INDEX DOCD: CPT | Mod: CPTII,S$GLB,, | Performed by: INTERNAL MEDICINE

## 2023-03-08 PROCEDURE — 99999 PR PBB SHADOW E&M-EST. PATIENT-LVL III: CPT | Mod: PBBFAC,,, | Performed by: INTERNAL MEDICINE

## 2023-03-08 PROCEDURE — 3074F SYST BP LT 130 MM HG: CPT | Mod: CPTII,S$GLB,, | Performed by: INTERNAL MEDICINE

## 2023-03-08 PROCEDURE — 3079F PR MOST RECENT DIASTOLIC BLOOD PRESSURE 80-89 MM HG: ICD-10-PCS | Mod: CPTII,S$GLB,, | Performed by: INTERNAL MEDICINE

## 2023-03-08 PROCEDURE — 3074F PR MOST RECENT SYSTOLIC BLOOD PRESSURE < 130 MM HG: ICD-10-PCS | Mod: CPTII,S$GLB,, | Performed by: INTERNAL MEDICINE

## 2023-03-08 RX ORDER — TRAZODONE HYDROCHLORIDE 50 MG/1
50 TABLET ORAL NIGHTLY
Qty: 90 TABLET | Refills: 3 | Status: SHIPPED | OUTPATIENT
Start: 2023-03-08 | End: 2023-07-10 | Stop reason: SDUPTHER

## 2023-03-08 RX ORDER — TRAMADOL HYDROCHLORIDE AND ACETAMINOPHEN 37.5; 325 MG/1; MG/1
1 TABLET, FILM COATED ORAL EVERY 6 HOURS PRN
Qty: 100 TABLET | Refills: 5 | Status: SHIPPED | OUTPATIENT
Start: 2023-03-08 | End: 2023-11-10 | Stop reason: SDUPTHER

## 2023-03-08 RX ORDER — BACLOFEN 10 MG/1
10 TABLET ORAL NIGHTLY
Qty: 90 TABLET | Refills: 3 | Status: SHIPPED | OUTPATIENT
Start: 2023-03-08 | End: 2023-11-10 | Stop reason: SDUPTHER

## 2023-03-08 RX ORDER — DESVENLAFAXINE SUCCINATE 50 MG/1
50 TABLET, EXTENDED RELEASE ORAL DAILY
Qty: 90 TABLET | Refills: 3 | Status: SHIPPED | OUTPATIENT
Start: 2023-03-08 | End: 2023-07-10 | Stop reason: SDUPTHER

## 2023-03-08 RX ORDER — TRAMADOL HYDROCHLORIDE AND ACETAMINOPHEN 37.5; 325 MG/1; MG/1
1 TABLET, FILM COATED ORAL EVERY 6 HOURS PRN
COMMUNITY
End: 2023-03-08 | Stop reason: SDUPTHER

## 2023-03-08 RX ORDER — ALPRAZOLAM 0.5 MG/1
0.5 TABLET ORAL
Qty: 90 TABLET | Refills: 3 | Status: SHIPPED | OUTPATIENT
Start: 2023-03-08

## 2023-03-08 NOTE — PROGRESS NOTES
"Subjective:       Patient ID: Padmini La is a 37 y.o. female.    Chief Complaint: Follow-up (Follow up. Patient verbalized 2/10 pain at this time. No other complaints)    The patient's rheumatoid arthritis has been stable but the patient is still having a morning stiffness lasting for around 30-45 minutes when the weather is bad.  On a good time her morning stiffness is lasting for less than 30 minutes.  She decided not to be on treatment for rheumatoid since all the treatment provided had given her side effects.  I discussed with her the possibility of being on turmeric 500 mg twice daily and she was very receptive to this option.  I wrote it down for her on a piece of paper to buy it over-the-counter.  She was satisfied with this option.  As for the fibromyalgia pain it also has been stable and we discussed adjusting the dosages as in the medication list.  The pain is 3/10 in intensity dull in quality and continuous.  It is associated with difficulty sleeping and fatigue.    Review of Systems   Constitutional:  Negative for appetite change, chills and fever.   HENT:  Negative for congestion, ear pain, mouth sores, nosebleeds and trouble swallowing.    Eyes:  Negative for photophobia and discharge.   Respiratory:  Negative for chest tightness and shortness of breath.    Cardiovascular:  Negative for chest pain.   Gastrointestinal:  Negative for abdominal pain and vomiting.   Endocrine: Negative.    Genitourinary:  Negative for hematuria.   Musculoskeletal:         As per HPI   Skin:  Negative for rash.   Neurological:  Negative for weakness.       Objective:   /85 (BP Location: Left arm, Patient Position: Sitting, BP Method: Medium (Automatic))   Pulse 72   Temp 98.6 °F (37 °C) (Oral)   Ht 5' 2" (1.575 m)   Wt 64.4 kg (142 lb)   LMP 03/01/2023 (Approximate)   SpO2 99%   BMI 25.97 kg/m²      Physical Exam   Constitutional: She is oriented to person, place, and time. She appears well-developed and " well-nourished. No distress.   HENT:   Head: Normocephalic and atraumatic.   Right Ear: External ear normal.   Left Ear: External ear normal.   Eyes: Pupils are equal, round, and reactive to light.   Cardiovascular: Normal rate, regular rhythm and normal heart sounds.   Pulmonary/Chest: Breath sounds normal.   Abdominal: Soft. There is no abdominal tenderness.   Musculoskeletal:      Right shoulder: Normal.      Left shoulder: Normal.      Right elbow: Normal.      Left elbow: Normal.      Right wrist: Normal.      Left wrist: Normal.      Cervical back: Neck supple.      Right hip: Normal.      Left hip: Normal.      Right knee: Normal.      Left knee: Normal.   Lymphadenopathy:     She has no cervical adenopathy.   Neurological: She is alert and oriented to person, place, and time. She displays normal reflexes. No cranial nerve deficit or sensory deficit. She exhibits normal muscle tone. Coordination normal.   Skin: No rash noted. No erythema.   Vitals reviewed.      Right Side Rheumatological Exam     Examination finds the shoulder, elbow, wrist, knee, 1st PIP, 1st MCP, 2nd PIP, 2nd MCP, 3rd PIP, 3rd MCP, 4th PIP, 4th MCP, 5th PIP, 5th MCP, temporomandibular, hip, ankle, 1st MTP, 2nd MTP, 3rd MTP, 4th MTP and 5th MTP normal.    Left Side Rheumatological Exam     Examination finds the shoulder, elbow, wrist, knee, 1st PIP, 1st MCP, 2nd PIP, 2nd MCP, 3rd PIP, 3rd MCP, 4th PIP, 4th MCP, 5th PIP, 5th MCP, temporomandibular, hip, ankle, 1st MTP, 2nd MTP, 3rd MTP, 4th MTP and 5th MTP normal.       Completed Fibromyalgia exam 11/18 tender points.  No data to display     Assessment:       1. Migraine without aura and with status migrainosus, not intractable    2. Depressive disorder    3. Seropositive rheumatoid arthritis    4. Fibromyalgia    5. Insomnia, unspecified type    6. Irritable bowel syndrome with constipation            Medication List with Changes/Refills   Current Medications    ACETAMINOPHEN-CAFFEINE ORAL     Take 1 tablet by mouth as needed.       Start Date: 12/2/2021 End Date: --    AZELASTINE (ASTELIN) 137 MCG (0.1 %) NASAL SPRAY    2 sprays by Nasal route 2 (two) times daily.       Start Date: 3/20/2022 End Date: --    FLUTICASONE PROPIONATE (FLONASE) 50 MCG/ACTUATION NASAL SPRAY    2 sprays by Each Nostril route 2 (two) times daily.       Start Date: 6/25/2022 End Date: --    INV TESTOSTERONE/ANASTROZOL OR PLACEBO PELLETS    Inject 1 Pellet into the skin every 3 (three) months. FOR INVESTIGATIONAL USE ONLY       Start Date: --        End Date: --    MONTELUKAST (SINGULAIR) 10 MG TABLET    Take 10 mg by mouth once daily.       Start Date: 6/13/2022 End Date: --    PROMETHAZINE (PHENERGAN) 25 MG TABLET    Take 25 mg by mouth as needed.       Start Date: 11/18/2021End Date: --    ZOLMITRIPTAN (ZOMIG) 5 MG TABLET    Take 1 tablet (5 mg total) by mouth as needed for Migraine (1 pill when the migraine starts, may repeat in 2 h if needed. max 2 in 24 hours.).       Start Date: 11/8/2022 End Date: --   Changed and/or Refilled Medications    Modified Medication Previous Medication    ALPRAZOLAM (XANAX) 0.5 MG TABLET ALPRAZolam (XANAX) 0.5 MG tablet       Take 1 tablet (0.5 mg total) by mouth as needed for Anxiety.    Take 1 tablet (0.5 mg total) by mouth as needed for Anxiety.       Start Date: 3/8/2023  End Date: --    Start Date: 11/8/2022 End Date: 3/8/2023    BACLOFEN (LIORESAL) 10 MG TABLET baclofen (LIORESAL) 10 MG tablet       Take 1 tablet (10 mg total) by mouth every evening.    Take 1 tablet (10 mg total) by mouth every evening.       Start Date: 3/8/2023  End Date: --    Start Date: 11/8/2022 End Date: 3/8/2023    DESVENLAFAXINE SUCCINATE (PRISTIQ) 50 MG TB24 desvenlafaxine succinate (PRISTIQ) 50 MG Tb24       Take 1 tablet (50 mg total) by mouth once daily.    Take 2 tablets (100 mg total) by mouth once daily.       Start Date: 3/8/2023  End Date: 3/7/2024    Start Date: 11/8/2022 End Date: 3/8/2023     TRAMADOL-ACETAMINOPHEN 37.5-325 MG (ULTRACET) 37.5-325 MG TAB tramadol-acetaminophen 37.5-325 mg (ULTRACET) 37.5-325 mg Tab       Take 1 tablet by mouth every 6 (six) hours as needed for Pain.    Take 1 tablet by mouth every 6 (six) hours as needed for Pain.       Start Date: 3/8/2023  End Date: --    Start Date: --        End Date: 3/8/2023    TRAZODONE (DESYREL) 50 MG TABLET traZODone (DESYREL) 100 MG tablet       Take 1 tablet (50 mg total) by mouth nightly.    Take 100 mg by mouth nightly.       Start Date: 3/8/2023  End Date: --    Start Date: 6/25/2022 End Date: 3/8/2023   Discontinued Medications    DESVENLAFAXINE SUCCINATE (PRISTIQ) 100 MG TB24    Take 1 tablet (100 mg total) by mouth once daily.       Start Date: 11/8/2022 End Date: 3/8/2023    HYOSCYAMINE (ANASPAZ,LEVSIN) 0.125 MG TAB    Take 1 tablet (125 mcg total) by mouth every 6 (six) hours as needed (abdominal pain).       Start Date: 8/25/2022 End Date: 3/8/2023         Plan:         Problem List Items Addressed This Visit          Neuro    Migraine without aura with status migrainosus - Primary    Relevant Medications    desvenlafaxine succinate (PRISTIQ) 50 MG Tb24    ALPRAZolam (XANAX) 0.5 MG tablet    tramadol-acetaminophen 37.5-325 mg (ULTRACET) 37.5-325 mg Tab    traZODone (DESYREL) 50 MG tablet    baclofen (LIORESAL) 10 MG tablet       Psychiatric    Depressive disorder    Relevant Medications    desvenlafaxine succinate (PRISTIQ) 50 MG Tb24    ALPRAZolam (XANAX) 0.5 MG tablet    tramadol-acetaminophen 37.5-325 mg (ULTRACET) 37.5-325 mg Tab    traZODone (DESYREL) 50 MG tablet    baclofen (LIORESAL) 10 MG tablet       Immunology/Multi System    Seropositive rheumatoid arthritis    Relevant Medications    desvenlafaxine succinate (PRISTIQ) 50 MG Tb24    ALPRAZolam (XANAX) 0.5 MG tablet    tramadol-acetaminophen 37.5-325 mg (ULTRACET) 37.5-325 mg Tab    traZODone (DESYREL) 50 MG tablet    baclofen (LIORESAL) 10 MG tablet       GI    Irritable  bowel syndrome with constipation    Relevant Medications    desvenlafaxine succinate (PRISTIQ) 50 MG Tb24    ALPRAZolam (XANAX) 0.5 MG tablet    tramadol-acetaminophen 37.5-325 mg (ULTRACET) 37.5-325 mg Tab    traZODone (DESYREL) 50 MG tablet    baclofen (LIORESAL) 10 MG tablet       Orthopedic    Fibromyalgia    Relevant Medications    desvenlafaxine succinate (PRISTIQ) 50 MG Tb24    ALPRAZolam (XANAX) 0.5 MG tablet    tramadol-acetaminophen 37.5-325 mg (ULTRACET) 37.5-325 mg Tab    traZODone (DESYREL) 50 MG tablet    baclofen (LIORESAL) 10 MG tablet       Other    Insomnia    Relevant Medications    desvenlafaxine succinate (PRISTIQ) 50 MG Tb24    ALPRAZolam (XANAX) 0.5 MG tablet    tramadol-acetaminophen 37.5-325 mg (ULTRACET) 37.5-325 mg Tab    traZODone (DESYREL) 50 MG tablet    baclofen (LIORESAL) 10 MG tablet

## 2023-03-13 ENCOUNTER — TELEPHONE (OUTPATIENT)
Dept: RHEUMATOLOGY | Facility: CLINIC | Age: 38
End: 2023-03-13
Payer: MEDICAID

## 2023-03-13 NOTE — TELEPHONE ENCOUNTER
We are in the process of working on PA's and we are a little backed up .we will work on this PA as soon as it is received. Thanks

## 2023-03-13 NOTE — TELEPHONE ENCOUNTER
----- Message from Ebony Fry sent at 3/13/2023  9:15 AM CDT -----  Regarding: PA  Patient called stating her pharmacy need a PA for Desverilafaxine. [facility] in [place]

## 2023-03-26 ENCOUNTER — HOSPITAL ENCOUNTER (EMERGENCY)
Facility: HOSPITAL | Age: 38
Discharge: HOME | End: 2023-03-26
Attending: INTERNAL MEDICINE
Payer: COMMERCIAL

## 2023-03-26 VITALS
RESPIRATION RATE: 18 BRPM | HEIGHT: 62 IN | TEMPERATURE: 99 F | OXYGEN SATURATION: 97 % | BODY MASS INDEX: 25.03 KG/M2 | HEART RATE: 78 BPM | DIASTOLIC BLOOD PRESSURE: 81 MMHG | SYSTOLIC BLOOD PRESSURE: 111 MMHG | WEIGHT: 136 LBS

## 2023-03-26 DIAGNOSIS — S76.012A STRAIN OF LEFT HIP, INITIAL ENCOUNTER: Primary | ICD-10-CM

## 2023-03-26 DIAGNOSIS — R31.9 URINARY TRACT INFECTION WITH HEMATURIA, SITE UNSPECIFIED: ICD-10-CM

## 2023-03-26 DIAGNOSIS — N39.0 URINARY TRACT INFECTION WITH HEMATURIA, SITE UNSPECIFIED: ICD-10-CM

## 2023-03-26 DIAGNOSIS — N20.0 LEFT NEPHROLITHIASIS: ICD-10-CM

## 2023-03-26 LAB
ALBUMIN SERPL-MCNC: 4 G/DL (ref 3.5–5)
ALBUMIN/GLOB SERPL: 1.1 RATIO (ref 1.1–2)
ALP SERPL-CCNC: 57 UNIT/L (ref 40–150)
ALT SERPL-CCNC: 13 UNIT/L (ref 0–55)
APPEARANCE UR: ABNORMAL
AST SERPL-CCNC: 21 UNIT/L (ref 5–34)
B-HCG SERPL QL: NEGATIVE
BACTERIA #/AREA URNS AUTO: ABNORMAL /HPF
BASOPHILS # BLD AUTO: 0.03 X10(3)/MCL (ref 0–0.2)
BASOPHILS NFR BLD AUTO: 0.4 %
BILIRUB UR QL STRIP.AUTO: NEGATIVE MG/DL
BILIRUBIN DIRECT+TOT PNL SERPL-MCNC: 0.4 MG/DL
BUN SERPL-MCNC: 12.9 MG/DL (ref 7–18.7)
CALCIUM SERPL-MCNC: 9.7 MG/DL (ref 8.4–10.2)
CHLORIDE SERPL-SCNC: 108 MMOL/L (ref 98–107)
CO2 SERPL-SCNC: 23 MMOL/L (ref 22–29)
COLOR UR AUTO: ABNORMAL
CREAT SERPL-MCNC: 0.76 MG/DL (ref 0.55–1.02)
EOSINOPHIL # BLD AUTO: 0.1 X10(3)/MCL (ref 0–0.9)
EOSINOPHIL NFR BLD AUTO: 1.4 %
ERYTHROCYTE [DISTWIDTH] IN BLOOD BY AUTOMATED COUNT: 12 % (ref 11.5–17)
GFR SERPLBLD CREATININE-BSD FMLA CKD-EPI: >60 MLS/MIN/1.73/M2
GLOBULIN SER-MCNC: 3.5 GM/DL (ref 2.4–3.5)
GLUCOSE SERPL-MCNC: 103 MG/DL (ref 74–100)
GLUCOSE UR QL STRIP.AUTO: 100 MG/DL
HCT VFR BLD AUTO: 37 % (ref 37–47)
HGB BLD-MCNC: 12.4 G/DL (ref 12–16)
IMM GRANULOCYTES # BLD AUTO: 0.01 X10(3)/MCL (ref 0–0.04)
IMM GRANULOCYTES NFR BLD AUTO: 0.1 %
KETONES UR QL STRIP.AUTO: 40 MG/DL
LEUKOCYTE ESTERASE UR QL STRIP.AUTO: ABNORMAL UNIT/L
LYMPHOCYTES # BLD AUTO: 2.08 X10(3)/MCL (ref 0.6–4.6)
LYMPHOCYTES NFR BLD AUTO: 28.9 %
MCH RBC QN AUTO: 31 PG (ref 27–31)
MCHC RBC AUTO-ENTMCNC: 33.5 G/DL (ref 33–36)
MCV RBC AUTO: 92.5 FL (ref 80–94)
MONOCYTES # BLD AUTO: 0.7 X10(3)/MCL (ref 0.1–1.3)
MONOCYTES NFR BLD AUTO: 9.7 %
NEUTROPHILS # BLD AUTO: 4.28 X10(3)/MCL (ref 2.1–9.2)
NEUTROPHILS NFR BLD AUTO: 59.5 %
NITRITE UR QL STRIP.AUTO: POSITIVE
NRBC BLD AUTO-RTO: 0 %
PH UR STRIP.AUTO: 7 [PH]
PLATELET # BLD AUTO: 358 X10(3)/MCL (ref 130–400)
PMV BLD AUTO: 9 FL (ref 7.4–10.4)
POTASSIUM SERPL-SCNC: 4.3 MMOL/L (ref 3.5–5.1)
PROT SERPL-MCNC: 7.5 GM/DL (ref 6.4–8.3)
PROT UR QL STRIP.AUTO: >=300 MG/DL
RBC # BLD AUTO: 4 X10(6)/MCL (ref 4.2–5.4)
RBC #/AREA URNS AUTO: >100 /HPF
RBC UR QL AUTO: ABNORMAL UNIT/L
SODIUM SERPL-SCNC: 139 MMOL/L (ref 136–145)
SP GR UR STRIP.AUTO: 1.01
SQUAMOUS #/AREA URNS AUTO: ABNORMAL /HPF
UROBILINOGEN UR STRIP-ACNC: >=8 MG/DL
WBC # SPEC AUTO: 7.2 X10(3)/MCL (ref 4.5–11.5)
WBC #/AREA URNS AUTO: ABNORMAL /HPF

## 2023-03-26 PROCEDURE — 85025 COMPLETE CBC W/AUTO DIFF WBC: CPT | Performed by: INTERNAL MEDICINE

## 2023-03-26 PROCEDURE — 80053 COMPREHEN METABOLIC PANEL: CPT | Performed by: INTERNAL MEDICINE

## 2023-03-26 PROCEDURE — 96375 TX/PRO/DX INJ NEW DRUG ADDON: CPT

## 2023-03-26 PROCEDURE — 63600175 PHARM REV CODE 636 W HCPCS: Performed by: INTERNAL MEDICINE

## 2023-03-26 PROCEDURE — 81025 URINE PREGNANCY TEST: CPT | Performed by: INTERNAL MEDICINE

## 2023-03-26 PROCEDURE — 81001 URINALYSIS AUTO W/SCOPE: CPT | Performed by: INTERNAL MEDICINE

## 2023-03-26 PROCEDURE — 96374 THER/PROPH/DIAG INJ IV PUSH: CPT

## 2023-03-26 PROCEDURE — 99285 EMERGENCY DEPT VISIT HI MDM: CPT | Mod: 25

## 2023-03-26 PROCEDURE — 25000003 PHARM REV CODE 250: Performed by: INTERNAL MEDICINE

## 2023-03-26 RX ORDER — HYDROCODONE BITARTRATE AND ACETAMINOPHEN 5; 325 MG/1; MG/1
1 TABLET ORAL EVERY 6 HOURS PRN
Qty: 8 TABLET | Refills: 0 | Status: SHIPPED | OUTPATIENT
Start: 2023-03-26 | End: 2023-07-27

## 2023-03-26 RX ORDER — CEPHALEXIN 500 MG/1
500 CAPSULE ORAL EVERY 12 HOURS
Qty: 20 CAPSULE | Refills: 0 | Status: SHIPPED | OUTPATIENT
Start: 2023-03-26 | End: 2023-04-05

## 2023-03-26 RX ORDER — PREDNISONE 20 MG/1
20 TABLET ORAL DAILY
Qty: 5 TABLET | Refills: 0 | Status: SHIPPED | OUTPATIENT
Start: 2023-03-26 | End: 2023-03-31

## 2023-03-26 RX ORDER — KETOROLAC TROMETHAMINE 10 MG/1
10 TABLET, FILM COATED ORAL EVERY 6 HOURS PRN
Qty: 15 TABLET | Refills: 0 | Status: SHIPPED | OUTPATIENT
Start: 2023-03-26 | End: 2023-07-27

## 2023-03-26 RX ORDER — KETOROLAC TROMETHAMINE 30 MG/ML
30 INJECTION, SOLUTION INTRAMUSCULAR; INTRAVENOUS ONCE
Status: COMPLETED | OUTPATIENT
Start: 2023-03-26 | End: 2023-03-26

## 2023-03-26 RX ADMIN — SODIUM CHLORIDE 1000 ML: 9 INJECTION, SOLUTION INTRAVENOUS at 01:03

## 2023-03-26 RX ADMIN — KETOROLAC TROMETHAMINE 30 MG: 30 INJECTION, SOLUTION INTRAMUSCULAR at 02:03

## 2023-03-26 RX ADMIN — CEFTRIAXONE SODIUM 1 G: 1 INJECTION, POWDER, FOR SOLUTION INTRAMUSCULAR; INTRAVENOUS at 02:03

## 2023-03-26 NOTE — ED PROVIDER NOTES
Source of History:  Patient, no limitations    Chief complaint:  Hip Pain (C/o acute on chronic hip pain. States started hurting worse after playing kickball yesterday)      HPI:  Padmini La is a 37 y.o. female presenting with Hip Pain (C/o acute on chronic hip pain. States started hurting worse after playing kickball yesterday)         The patient complains of pain in the left hip with radiation to left SI after a twisting injury. Onset of symptoms was abrupt starting 1 day ago. Patient describes pain as aching and sharp/stabbing. Pain severity at worst was intense. Pain is aggravated by movement. Pain is alleviated by immobilization. Symptoms associated with pain include none. The patient denies other injuries.  Care prior to arrival consisted of rest, with minimal relief.        Review of Systems   Constitutional symptoms:  Negative except as documented in HPI.   Skin symptoms:  Negative except as documented in HPI.   HEENT symptoms:  Negative except as documented in HPI.   Respiratory symptoms:  Negative except as documented in HPI.   Cardiovascular symptoms:  Negative except as documented in HPI.   Gastrointestinal symptoms:  Negative except as documented in HPI.    Genitourinary symptoms:  Negative except as documented in HPI.   Musculoskeletal symptoms:  Negative except as documented in HPI.   Neurologic symptoms:  Negative except as documented in HPI.   Psychiatric symptoms:  Negative except as documented in HPI.   Allergy/immunologic symptoms:  Negative except as documented in HPI.             Additional review of systems information: All other systems reviewed and otherwise negative.      Review of patient's allergies indicates:   Allergen Reactions    Lactase      Other reaction(s): Inflammation , bloating , cramps    Sulfabenzamide      Cutaneous eruption    Sulfacetamide      Cutaneous eruption    Sulfamethoxazole-trimethoprim      Other reaction(s): Abnormal vaginal bleeding    Sulfathiazole      " Cutaneous eruption    Egg      Other reaction(s): Abdominal bloating    Sulfa (sulfonamide antibiotics) Rash       PMH:  As per HPI and below:    Past Medical History:   Diagnosis Date    Allergy     Fibromyalgia     IBS (irritable bowel syndrome)         Family History   Problem Relation Age of Onset    Heart disease Mother     Hypertension Mother     Hypertension Father     Heart disease Father     Hypertension Sister     Crohn's disease Brother        Past Surgical History:   Procedure Laterality Date    COLONOSCOPY  11/25/2020    Dr Elaine Daley    UPPER GASTROINTESTINAL ENDOSCOPY         Social History     Tobacco Use    Smoking status: Never     Passive exposure: Never    Smokeless tobacco: Never   Substance Use Topics    Alcohol use: Not Currently     Comment: occasionally    Drug use: No       Patient Active Problem List   Diagnosis    Constipation    Depressive disorder    Fibromyalgia    Insomnia    Irritable bowel syndrome with constipation    Migraine without aura with status migrainosus    Pain in pelvis    Seropositive rheumatoid arthritis    Vulvodynia        Physical Exam:    /77   Pulse 78   Temp 98.6 °F (37 °C) (Oral)   Resp 18   Ht 5' 2" (1.575 m)   Wt 61.7 kg (136 lb)   LMP 03/26/2023 (Approximate)   SpO2 97%   Breastfeeding No   BMI 24.87 kg/m²     Nursing note and vital signs reviewed.    General:  Alert, no acute distress.   Skin: Normal for Ethnic Origin, No cyanosis  HEENT: Normocephalic and atraumatic, Vision unchanged, Pupils symmetric, No icterus , Nasal mucosa is pink and moist  Cardiovascular:  Regular rate and rhythm, No edema  Chest Wall: No deformity, equal chest rise  Respiratory:  Lungs are clear to auscultation, respirations are non-labored.    Musculoskeletal:  No deformity, Normal perfusion to all extremities, painful ROM left hip, ambulates with mild difficulty unassisted, no red flags  Gastrointestinal:  Soft, Non distended  Gu: questionable left CVA " tenderness  Neurological:  Alert and oriented, normal motor observed, normal speech observed.    Psychiatric:  Cooperative, appropriate mood & affect.        Labs that have been ordered have been independently reviewed and interpreted by myself.     Old Chart Reviewed.      Initial Impression/ Differential Dx:  Muscular pain, herniated disc, spine fracture, intra-abdominal causes and urinary tract infection, dehydration.       MDM:      Reviewed Nurses Note.    Reviewed Pertinent old records.    Orders Placed This Encounter    CT Renal Stone Study ABD Pelvis WO    Urinalysis, Reflex to Urine Culture    Pregnancy, urine rapid    Urinalysis, Microscopic    CBC Auto Differential    Comprehensive Metabolic Panel    CBC with Differential    Insert peripheral IV    sodium chloride 0.9% bolus 1,000 mL 1,000 mL    ketorolac injection 30 mg    cefTRIAXone (ROCEPHIN) 1 g in dextrose 5 % in water (D5W) 5 % 50 mL IVPB (MB+)    HYDROcodone-acetaminophen (NORCO) 5-325 mg per tablet    predniSONE (DELTASONE) 20 MG tablet    cephALEXin (KEFLEX) 500 MG capsule    ketorolac (TORADOL) 10 mg tablet                    Labs Reviewed   URINALYSIS, REFLEX TO URINE CULTURE - Abnormal; Notable for the following components:       Result Value    Color, UA Red (*)     Appearance, UA Turbid (*)     Protein, UA >=300 (*)     Glucose,  (*)     Ketones, UA 40 (*)     Blood, UA Large (*)     Urobilinogen, UA >=8.0 (*)     Nitrites, UA Positive (*)     Leukocyte Esterase, UA Large (*)     All other components within normal limits   URINALYSIS, MICROSCOPIC - Abnormal; Notable for the following components:    RBC, UA >100 (*)     All other components within normal limits   COMPREHENSIVE METABOLIC PANEL - Abnormal; Notable for the following components:    Chloride 108 (*)     Glucose Level 103 (*)     All other components within normal limits   CBC WITH DIFFERENTIAL - Abnormal; Notable for the following components:    RBC 4.00 (*)     All other  components within normal limits   PREGNANCY TEST, URINE RAPID - Normal   CBC W/ AUTO DIFFERENTIAL    Narrative:     The following orders were created for panel order CBC Auto Differential.  Procedure                               Abnormality         Status                     ---------                               -----------         ------                     CBC with Differential[[id number]]        Abnormal            Final result                 Please view results for these tests on the individual orders.          CT Renal Stone Study ABD Pelvis WO   Final Result           Admission on 03/26/2023   Component Date Value Ref Range Status    Color, UA 03/26/2023 Red (A)  Yellow, Light-Yellow, Dark Yellow, [place], Straw Final    Appearance, UA 03/26/2023 Turbid (A)  Clear Final    Specific Gravity, UA 03/26/2023 1.015   Final    pH, UA 03/26/2023 7.0  5.0 - 8.5 Final    Protein, UA 03/26/2023 >=300 (A)  Negative mg/dL Final    Glucose, UA 03/26/2023 100 (A)  Negative, Normal mg/dL Final    Ketones, UA 03/26/2023 40 (A)  Negative mg/dL Final    Blood, UA 03/26/2023 Large (A)  Negative unit/L Final    Bilirubin, UA 03/26/2023 Negative  Negative mg/dL Final    Urobilinogen, UA 03/26/2023 >=8.0 (A)  0.2, 1.0, Normal mg/dL Final    Nitrites, UA 03/26/2023 Positive (A)  Negative Final    Leukocyte Esterase, UA 03/26/2023 Large (A)  Negative unit/L Final    Beta hCG Qualitative, Urine 03/26/2023 Negative  Negative Final    Bacteria, UA 03/26/2023 Rare  None Seen, Rare, Occasional /HPF Final    RBC, UA 03/26/2023 >100 (A)  None Seen, 0-2, 3-5, 0-5 /HPF Final    WBC, UA 03/26/2023 0-2  None Seen, 0-2, 3-5, 0-5 /HPF Final    Squamous Epithelial Cells, UA 03/26/2023 Rare  None Seen, Rare, Occasional, Occ /HPF Final    Sodium Level 03/26/2023 139  136 - 145 mmol/L Final    Potassium Level 03/26/2023 4.3  3.5 - 5.1 mmol/L Final    Chloride 03/26/2023 108 (H)  98 - 107 mmol/L Final    Carbon Dioxide 03/26/2023 23  22 - 29 mmol/L  Final    Glucose Level 03/26/2023 103 (H)  74 - 100 mg/dL Final    Blood Urea Nitrogen 03/26/2023 12.9  7.0 - 18.7 mg/dL Final    Creatinine 03/26/2023 0.76  0.55 - 1.02 mg/dL Final    Calcium Level Total 03/26/2023 9.7  8.4 - 10.2 mg/dL Final    Protein Total 03/26/2023 7.5  6.4 - 8.3 gm/dL Final    Albumin Level 03/26/2023 4.0  3.5 - 5.0 g/dL Final    Globulin 03/26/2023 3.5  2.4 - 3.5 gm/dL Final    Albumin/Globulin Ratio 03/26/2023 1.1  1.1 - 2.0 ratio Final    Bilirubin Total 03/26/2023 0.4  <=1.5 mg/dL Final    Alkaline Phosphatase 03/26/2023 57  40 - 150 unit/L Final    Alanine Aminotransferase 03/26/2023 13  0 - 55 unit/L Final    Aspartate Aminotransferase 03/26/2023 21  5 - 34 unit/L Final    eGFR 03/26/2023 >60  mls/min/1.73/m2 Final    WBC 03/26/2023 7.2  4.5 - 11.5 x10(3)/mcL Final    RBC 03/26/2023 4.00 (L)  4.20 - 5.40 x10(6)/mcL Final    Hgb 03/26/2023 12.4  12.0 - 16.0 g/dL Final    Hct 03/26/2023 37.0  37.0 - 47.0 % Final    MCV 03/26/2023 92.5  80.0 - 94.0 fL Final    MCH 03/26/2023 31.0  27.0 - 31.0 pg Final    MCHC 03/26/2023 33.5  33.0 - 36.0 g/dL Final    RDW 03/26/2023 12.0  11.5 - 17.0 % Final    Platelet 03/26/2023 358  130 - 400 x10(3)/mcL Final    MPV 03/26/2023 9.0  7.4 - 10.4 fL Final    Neut % 03/26/2023 59.5  % Final    Lymph % 03/26/2023 28.9  % Final    Mono % 03/26/2023 9.7  % Final    Eos % 03/26/2023 1.4  % Final    Basophil % 03/26/2023 0.4  % Final    Lymph # 03/26/2023 2.08  0.6 - 4.6 x10(3)/mcL Final    Neut # 03/26/2023 4.28  2.1 - 9.2 x10(3)/mcL Final    Mono # 03/26/2023 0.70  0.1 - 1.3 x10(3)/mcL Final    Eos # 03/26/2023 0.10  0 - 0.9 x10(3)/mcL Final    Baso # 03/26/2023 0.03  0 - 0.2 x10(3)/mcL Final    IG# 03/26/2023 0.01  0 - 0.04 x10(3)/mcL Final    IG% 03/26/2023 0.1  % Final    NRBC% 03/26/2023 0.0  % Final       Imaging Results              CT Renal Stone Study ABD Pelvis WO (Final result)  Result time 03/26/23 14:01:47      Final result by Doug Huang MD  (03/26/23 14:01:47)                   Narrative:    EXAMINATION  CT RENAL STONE STUDY ABD PELVIS WO    CLINICAL HISTORY  Flank pain, kidney stone suspected;    TECHNIQUE  Non-contrast helical-acquisition CT images were obtained and multiplanar reformats accomplished by a CT technologist at a separate workstation, pushed to PACS for physician review.    Enteric contrast: none utilized    COMPARISON  Outside facility abdominopelvic CT dated 18 September 2020.    FINDINGS  Images were reviewed in soft tissue, lung, and bone windows.    Exam quality: Inherently limited evaluation of the abdominopelvic organs and vasculature secondary to non-contrast protocol.  Additionally, improper technique utilized with incomplete visualization of the hepatic dome and lung bases.    Lines/tubes: none visualized    Partially visualized lower thoracic cavity is without evidence of acute or focal finding.  Aortoiliac tapering is normal through the abdomen and pelvis.  There is no significant pericardial or pleural fluid.    The gallbladder and bile ducts are normal in appearance.  The included liver tissue is without evidence of space-occupying lesion or other suspicious abnormality by non-contrast appearance.  The pancreas, spleen, and adrenal glands are grossly normal.    No expansile mass-like lesion or complex cyst is identified at the kidneys.  Punctate nonobstructing lower pole caliceal stone is present on the left.  There are no additional radiodense urolithiasis appreciated distally or findings of obstructive uropathy.  The urinary bladder is moderately distended, no focal mural irregularity or convincing intravesicular abnormality.  The uterus and adnexal tissues are normal for CT assessment.    The stomach is nondistended.  No abnormally dilated small bowel loops or discrete transition point appreciated to suggest high-grade mechanical obstruction.  The appendix is normal in appearance.  There are no acute pericolonic  inflammatory changes or focal mural lesion.  Large burden of retained fecal material is present throughout the large bowel, suggesting element of constipation.    No free intra-abdominal air or fluid is appreciated.  There are no pathologic enlarged lymph nodes.    Small uncomplicated fat containing umbilical hernia is incidentally appreciated.  Otherwise, the visualized subcutaneous tissues and regional muscular structures are normal in appearance.  No acute osseous displacement or destructive skeletal lesion is identified.    IMPRESSION  1. No convincing evidence of acute abnormality within the abdomen and pelvis.  2. Punctate nonobstructing left lower pole nephrolithiasis.  3. Findings suggestive of constipation.    RADIATION DOSE  Automated tube current modulation, weight-based exposure dosing, and/or iterative reconstruction technique utilized to reach lowest reasonably achievable exposure rate.    DLP: 330 mGy*cm      Electronically signed by: Doug Huang  Date:    03/26/2023  Time:    14:01                                                   ED Course as of 03/26/23 1423   Sun Mar 26, 2023   1257 NITRITE UA(!): Positive [MP]   1257 Leukocytes, UA(!): Large [MP]   1304 Occult Blood UA(!): Large [MP]   1304 NITRITE UA(!): Positive [MP]   1304 Urobilinogen, UA(!): >=8.0 [MP]   1304 Leukocytes, UA(!): Large [MP]      ED Course User Index  [MP] Kenneth Martin DO                        Diagnostic Impression:    1. Strain of left hip, initial encounter    2. Urinary tract infection with hematuria, site unspecified    3. Left nephrolithiasis         ED Disposition Condition    Discharge Stable             Follow-up Information       [facility] - Emergency Dept.    Specialty: Emergency Medicine  Why: If symptoms worsen  Contact information:  [address]  [facility] [address] [phone]             Schedule an appointment as soon as possible for a visit  with John  KARTHIKEYAN Valdes DO.    Specialty: Orthopedic Surgery  Why: If symptoms worsen  Contact information:  [address]  [address]  [facility] [address]  [phone]                              ED Prescriptions       Medication Sig Dispense Start Date End Date Auth. Provider    HYDROcodone-acetaminophen (NORCO) 5-325 mg per tablet Take 1 tablet by mouth every 6 (six) hours as needed for Pain. 8 tablet 3/26/2023 -- Kenneth Martin DO    predniSONE (DELTASONE) 20 MG tablet Take 1 tablet (20 mg total) by mouth once daily. for 5 days 5 tablet 3/26/2023 3/31/2023 Kenneth Martin DO    cephALEXin (KEFLEX) 500 MG capsule Take 1 capsule (500 mg total) by mouth every 12 (twelve) hours. for 10 days 20 capsule 3/26/2023 4/5/2023 Kenneth Martin DO    ketorolac (TORADOL) 10 mg tablet Take 1 tablet (10 mg total) by mouth every 6 (six) hours as needed for Pain. 15 tablet 3/26/2023 -- Kenneth Martin DO          Follow-up Information       Follow up With Specialties Details Why Contact Info    [facility] - Emergency Dept Emergency Medicine  If symptoms worsen [address]  [facility] [address] [phone]    John Valdes DO Orthopedic Surgery Schedule an appointment as soon as possible for a visit  If symptoms worsen [address]  [address]  [facility] [address]  [phone]               Kenneth Martin DO  03/26/23 1423

## 2023-03-30 ENCOUNTER — HOSPITAL ENCOUNTER (OUTPATIENT)
Dept: RADIOLOGY | Facility: CLINIC | Age: 38
Discharge: HOME | End: 2023-03-30
Attending: ORTHOPAEDIC SURGERY
Payer: COMMERCIAL

## 2023-03-30 ENCOUNTER — OFFICE VISIT (OUTPATIENT)
Dept: ORTHOPEDICS | Facility: CLINIC | Age: 38
End: 2023-03-30
Payer: COMMERCIAL

## 2023-03-30 VITALS
HEIGHT: 62 IN | DIASTOLIC BLOOD PRESSURE: 97 MMHG | HEART RATE: 75 BPM | WEIGHT: 137 LBS | SYSTOLIC BLOOD PRESSURE: 134 MMHG | BODY MASS INDEX: 25.21 KG/M2

## 2023-03-30 DIAGNOSIS — M25.551 CHRONIC PAIN OF BOTH HIPS: ICD-10-CM

## 2023-03-30 DIAGNOSIS — M54.16 LUMBAR RADICULOPATHY, CHRONIC: ICD-10-CM

## 2023-03-30 DIAGNOSIS — M25.552 CHRONIC PAIN OF BOTH HIPS: ICD-10-CM

## 2023-03-30 DIAGNOSIS — M25.551 CHRONIC PAIN OF BOTH HIPS: Primary | ICD-10-CM

## 2023-03-30 DIAGNOSIS — G89.29 CHRONIC PAIN OF BOTH HIPS: Primary | ICD-10-CM

## 2023-03-30 DIAGNOSIS — G89.29 CHRONIC PAIN OF BOTH HIPS: ICD-10-CM

## 2023-03-30 DIAGNOSIS — M25.559 HIP PAIN: ICD-10-CM

## 2023-03-30 DIAGNOSIS — M25.552 CHRONIC PAIN OF BOTH HIPS: Primary | ICD-10-CM

## 2023-03-30 DIAGNOSIS — M54.9 DORSALGIA, UNSPECIFIED: ICD-10-CM

## 2023-03-30 PROCEDURE — 99213 PR OFFICE/OUTPT VISIT, EST, LEVL III, 20-29 MIN: ICD-10-PCS | Mod: ,,, | Performed by: ORTHOPAEDIC SURGERY

## 2023-03-30 PROCEDURE — 73523 X-RAY EXAM HIPS BI 5/> VIEWS: CPT | Mod: ,,, | Performed by: ORTHOPAEDIC SURGERY

## 2023-03-30 PROCEDURE — 3075F PR MOST RECENT SYSTOLIC BLOOD PRESS GE 130-139MM HG: ICD-10-PCS | Mod: CPTII,,, | Performed by: ORTHOPAEDIC SURGERY

## 2023-03-30 PROCEDURE — 99213 OFFICE O/P EST LOW 20 MIN: CPT | Mod: ,,, | Performed by: ORTHOPAEDIC SURGERY

## 2023-03-30 PROCEDURE — 73523 XR HIP 5 OR MORE VIEWS BILATERAL: ICD-10-PCS | Mod: ,,, | Performed by: ORTHOPAEDIC SURGERY

## 2023-03-30 PROCEDURE — 3080F DIAST BP >= 90 MM HG: CPT | Mod: CPTII,,, | Performed by: ORTHOPAEDIC SURGERY

## 2023-03-30 PROCEDURE — 3008F PR BODY MASS INDEX (BMI) DOCUMENTED: ICD-10-PCS | Mod: CPTII,,, | Performed by: ORTHOPAEDIC SURGERY

## 2023-03-30 PROCEDURE — 3075F SYST BP GE 130 - 139MM HG: CPT | Mod: CPTII,,, | Performed by: ORTHOPAEDIC SURGERY

## 2023-03-30 PROCEDURE — 1159F MED LIST DOCD IN RCRD: CPT | Mod: CPTII,,, | Performed by: ORTHOPAEDIC SURGERY

## 2023-03-30 PROCEDURE — 1159F PR MEDICATION LIST DOCUMENTED IN MEDICAL RECORD: ICD-10-PCS | Mod: CPTII,,, | Performed by: ORTHOPAEDIC SURGERY

## 2023-03-30 PROCEDURE — 3008F BODY MASS INDEX DOCD: CPT | Mod: CPTII,,, | Performed by: ORTHOPAEDIC SURGERY

## 2023-03-30 PROCEDURE — 3080F PR MOST RECENT DIASTOLIC BLOOD PRESSURE >= 90 MM HG: ICD-10-PCS | Mod: CPTII,,, | Performed by: ORTHOPAEDIC SURGERY

## 2023-03-30 NOTE — PROGRESS NOTES
Chief Complaint:   Chief Complaint   Patient presents with    Hip Pain     Pt stated she has been dealing with this pain since 2013 after a pregnancy. Pt tried scans, PT, chiropractor and an injection in her S1 but it didnt get any better. Pt states she has pain in her piriforms muscle, lower back and sciatic nerve. Pain feels like a constant aching/burning sensation and she has a sensation that her hips are loose. She feels like her hips will give out on her.  Pt had an incident last Saturday where she stepped wrong and the pain worsened.       History of present illness:  37-year-old female presents today for evaluation of a pelvic pain.  Patient has a longstanding history of pain in her pelvis and pelvic rim.  The patient notes significant pelvic floor pain as well as radiating pain down her legs into her foot.  This has been going on for number of years.  She is done extensive physical therapy focused on her back as well has hypermobility which she was diagnosed status for the last 10 years.  This is failed to relieve her symptoms.  She says she was getting up catching a ball recently when she felt significant pain in her sacrum which go went down into her piriformis and then down her leg.    Past Medical History:   Diagnosis Date    Allergy     Fibromyalgia     IBS (irritable bowel syndrome)        Past Surgical History:   Procedure Laterality Date    COLONOSCOPY  11/25/2020    Dr Elaine Daley    UPPER GASTROINTESTINAL ENDOSCOPY         Current Outpatient Medications   Medication Sig    ACETAMINOPHEN-CAFFEINE ORAL Take 1 tablet by mouth as needed.    ALPRAZolam (XANAX) 0.5 MG tablet Take 1 tablet (0.5 mg total) by mouth as needed for Anxiety.    azelastine (ASTELIN) 137 mcg (0.1 %) nasal spray 2 sprays by Nasal route 2 (two) times daily.    baclofen (LIORESAL) 10 MG tablet Take 1 tablet (10 mg total) by mouth every evening.    cephALEXin (KEFLEX) 500 MG capsule Take 1 capsule (500 mg total) by mouth every  12 (twelve) hours. for 10 days    desvenlafaxine succinate (PRISTIQ) 50 MG Tb24 Take 1 tablet (50 mg total) by mouth once daily.    fluticasone propionate (FLONASE) 50 mcg/actuation nasal spray 2 sprays by Each Nostril route 2 (two) times daily.    HYDROcodone-acetaminophen (NORCO) 5-325 mg per tablet Take 1 tablet by mouth every 6 (six) hours as needed for Pain.    INV TESTOSTERONE/ANASTROZOL OR PLACEBO PELLETS Inject 1 Pellet into the skin every 3 (three) months. FOR INVESTIGATIONAL USE ONLY    ketorolac (TORADOL) 10 mg tablet Take 1 tablet (10 mg total) by mouth every 6 (six) hours as needed for Pain.    montelukast (SINGULAIR) 10 mg tablet Take 10 mg by mouth once daily.    predniSONE (DELTASONE) 20 MG tablet Take 1 tablet (20 mg total) by mouth once daily. for 5 days    promethazine (PHENERGAN) 25 MG tablet Take 25 mg by mouth as needed.    tramadol-acetaminophen 37.5-325 mg (ULTRACET) 37.5-325 mg Tab Take 1 tablet by mouth every 6 (six) hours as needed for Pain.    traZODone (DESYREL) 50 MG tablet Take 1 tablet (50 mg total) by mouth nightly.    ZOLMitriptan (ZOMIG) 5 MG tablet Take 1 tablet (5 mg total) by mouth as needed for Migraine (1 pill when the migraine starts, may repeat in 2 h if needed. max 2 in 24 hours.).     No current facility-administered medications for this visit.       Review of patient's allergies indicates:   Allergen Reactions    Lactase      Other reaction(s): Inflammation , bloating , cramps    Sulfabenzamide      Cutaneous eruption    Sulfacetamide      Cutaneous eruption    Sulfamethoxazole-trimethoprim      Other reaction(s): Abnormal vaginal bleeding    Sulfathiazole      Cutaneous eruption    Egg      Other reaction(s): Abdominal bloating    Sulfa (sulfonamide antibiotics) Rash       Family History   Problem Relation Age of Onset    Heart disease Mother     Hypertension Mother     Hypertension Father     Heart disease Father     Hypertension Sister     Crohn's disease Brother         Social History     Socioeconomic History    Marital status:    Tobacco Use    Smoking status: Never     Passive exposure: Never    Smokeless tobacco: Never   Substance and Sexual Activity    Alcohol use: Not Currently     Comment: occasionally    Drug use: No    Sexual activity: Yes     Partners: Male           Review of Systems:    Constitution: Negative for chills, fever, and sweats.  Negative for unexplained weight loss.    HENT:  Negative for headaches and blurry vision.    Cardiovascular:Negative for chest pain or irregular heart beat. Negative for hypertension.    Respiratory:  Negative for cough and shortness of breath.    Gastrointestinal: Negative for abdominal pain, heartburn, melena, nausea, and vomitting.    Genitourinary:  Negative bladder incontinence and dysuria.    Musculoskeletal:  See HPI    Neurological: Negative for numbness.    Psychiatric/Behavioral: Negative for depression.  The patient is not nervous/anxious.      Endocrine: Negative for polyuria    Hematologic/Lymphatic: Negative for bleeding problem.  Does not bruise/bleed easily.    Skin: Negative for poor would healing and rash      Physical Examination:    Vital Signs:    Vitals:    03/30/23 1308   BP: (!) 134/97   Pulse: 75       Body mass index is 25.06 kg/m².    General: No acute distress, alert and oriented, healthy appearing    HEENT: Head is atraumatic, mucous membranes are moist    Neck: Supples, no JVD    Cardiovascular: Palpable dorsalis pedis and posterior tibial pulses, regular rate and rhythm to those pulses    Lungs: Breathing non-labored    Skin: no rashes appreciated    Neurologic: Can flex and extend knees, ankles, and toes. Sensation is grossly intact    Bilateral hips:  Range of motion of both hips is well-maintained.  She has no significant pain with internal and external rotation.  She has a negative Stinchfield.  Brisk capillary refill distally.  The both legs.  Does have a positive straight leg raise  with pulling up into her buttocks.  Neuro intact distally to her foot.  No tenderness to either trochanteric bursae    X-rays:  Five views of bilateral hips reviewed.  Patient with out significant arthritic     Assessment::  Lumbar radiculopathy    Plan:  Discussed all treatment options the patient.  Her symptoms correlate with lumbar radiculopathy.  He has had MRIs of her lumbar spine in the past although nothing recently.  Plan to get an MRI of her lumbar spine given her failure of conservative management very this is not show any major abnormality, we will get an MR arthrogram of her pelvis to evaluate for acetabular labrum as well as for ischial issues.    This note was created using [employer] voice recognition software that occasionally misinterpreted phrases or words.    Consult note is delivered via Epic messaging service.

## 2023-04-03 DIAGNOSIS — M25.559 HIP PAIN: Primary | ICD-10-CM

## 2023-06-08 ENCOUNTER — OFFICE VISIT (OUTPATIENT)
Dept: ORTHOPEDICS | Facility: CLINIC | Age: 38
End: 2023-06-08
Payer: COMMERCIAL

## 2023-06-08 VITALS
BODY MASS INDEX: 26.31 KG/M2 | SYSTOLIC BLOOD PRESSURE: 122 MMHG | HEART RATE: 82 BPM | WEIGHT: 143 LBS | HEIGHT: 62 IN | DIASTOLIC BLOOD PRESSURE: 85 MMHG

## 2023-06-08 DIAGNOSIS — S73.192S ACETABULAR LABRUM TEAR, LEFT, SEQUELA: ICD-10-CM

## 2023-06-08 DIAGNOSIS — M25.552 PAIN OF LEFT HIP: Primary | ICD-10-CM

## 2023-06-08 PROCEDURE — 1159F MED LIST DOCD IN RCRD: CPT | Mod: CPTII,,, | Performed by: ORTHOPAEDIC SURGERY

## 2023-06-08 PROCEDURE — 1159F PR MEDICATION LIST DOCUMENTED IN MEDICAL RECORD: ICD-10-PCS | Mod: CPTII,,, | Performed by: ORTHOPAEDIC SURGERY

## 2023-06-08 PROCEDURE — 3008F PR BODY MASS INDEX (BMI) DOCUMENTED: ICD-10-PCS | Mod: CPTII,,, | Performed by: ORTHOPAEDIC SURGERY

## 2023-06-08 PROCEDURE — 3079F PR MOST RECENT DIASTOLIC BLOOD PRESSURE 80-89 MM HG: ICD-10-PCS | Mod: CPTII,,, | Performed by: ORTHOPAEDIC SURGERY

## 2023-06-08 PROCEDURE — 3079F DIAST BP 80-89 MM HG: CPT | Mod: CPTII,,, | Performed by: ORTHOPAEDIC SURGERY

## 2023-06-08 PROCEDURE — 3074F PR MOST RECENT SYSTOLIC BLOOD PRESSURE < 130 MM HG: ICD-10-PCS | Mod: CPTII,,, | Performed by: ORTHOPAEDIC SURGERY

## 2023-06-08 PROCEDURE — 3074F SYST BP LT 130 MM HG: CPT | Mod: CPTII,,, | Performed by: ORTHOPAEDIC SURGERY

## 2023-06-08 PROCEDURE — 99213 PR OFFICE/OUTPT VISIT, EST, LEVL III, 20-29 MIN: ICD-10-PCS | Mod: ,,, | Performed by: ORTHOPAEDIC SURGERY

## 2023-06-08 PROCEDURE — 3008F BODY MASS INDEX DOCD: CPT | Mod: CPTII,,, | Performed by: ORTHOPAEDIC SURGERY

## 2023-06-08 PROCEDURE — 99213 OFFICE O/P EST LOW 20 MIN: CPT | Mod: ,,, | Performed by: ORTHOPAEDIC SURGERY

## 2023-06-08 RX ORDER — NIACIN 500 MG
250 CAPSULE, EXTENDED RELEASE ORAL NIGHTLY
COMMUNITY
End: 2023-09-11

## 2023-06-08 RX ORDER — TURMERIC ROOT EXTRACT 500 MG
TABLET ORAL
COMMUNITY

## 2023-06-08 NOTE — PROGRESS NOTES
Chief Complaint:   Chief Complaint   Patient presents with    Right Hip - Pain    Left Hip - Pain    Hip Pain     Pt states she came to be re-evaluated by the doctor. Pt states she is still experiencing pain in both hips, which is intermittent and varies from 1-8 in the pain scale. Pt states that her hips feels out of place.       History of present illness:  37-year-old female presents for follow-up of hip pain.  Patient needs to have pain in her left hip and buttocks.  Worse with activity.  Improved by rest.  Patient has tried physical therapy.  This failed to relieve her symptoms.  Denies any significant groin pain today although she does have popping and catching on occasion.    Past Medical History:   Diagnosis Date    Allergy     Fibromyalgia     IBS (irritable bowel syndrome)        Past Surgical History:   Procedure Laterality Date    COLONOSCOPY  11/25/2020    Dr Elaine Daley    UPPER GASTROINTESTINAL ENDOSCOPY         Current Outpatient Medications   Medication Sig    ACETAMINOPHEN-CAFFEINE ORAL Take 1 tablet by mouth as needed.    ALPRAZolam (XANAX) 0.5 MG tablet Take 1 tablet (0.5 mg total) by mouth as needed for Anxiety.    azelastine (ASTELIN) 137 mcg (0.1 %) nasal spray 2 sprays by Nasal route 2 (two) times daily.    baclofen (LIORESAL) 10 MG tablet Take 1 tablet (10 mg total) by mouth every evening.    desvenlafaxine succinate (PRISTIQ) 50 MG Tb24 Take 1 tablet (50 mg total) by mouth once daily.    fluticasone propionate (FLONASE) 50 mcg/actuation nasal spray 2 sprays by Each Nostril route 2 (two) times daily.    HYDROcodone-acetaminophen (NORCO) 5-325 mg per tablet Take 1 tablet by mouth every 6 (six) hours as needed for Pain.    INV TESTOSTERONE/ANASTROZOL OR PLACEBO PELLETS Inject 1 Pellet into the skin every 3 (three) months. FOR INVESTIGATIONAL USE ONLY    ketorolac (TORADOL) 10 mg tablet Take 1 tablet (10 mg total) by mouth every 6 (six) hours as needed for Pain.    montelukast  (SINGULAIR) 10 mg tablet Take 10 mg by mouth once daily.    niacin 500 MG CpSR Take 250 mg by mouth every evening.    promethazine (PHENERGAN) 25 MG tablet Take 25 mg by mouth as needed.    tramadol-acetaminophen 37.5-325 mg (ULTRACET) 37.5-325 mg Tab Take 1 tablet by mouth every 6 (six) hours as needed for Pain.    traZODone (DESYREL) 50 MG tablet Take 1 tablet (50 mg total) by mouth nightly.    turmeric root extract 500 mg Tab Take by mouth.    ZOLMitriptan (ZOMIG) 5 MG tablet Take 1 tablet (5 mg total) by mouth as needed for Migraine (1 pill when the migraine starts, may repeat in 2 h if needed. max 2 in 24 hours.).     No current facility-administered medications for this visit.       Review of patient's allergies indicates:   Allergen Reactions    Lactase      Other reaction(s): Inflammation , bloating , cramps    Sulfabenzamide      Cutaneous eruption    Sulfacetamide      Cutaneous eruption    Sulfamethoxazole-trimethoprim      Other reaction(s): Abnormal vaginal bleeding    Sulfathiazole      Cutaneous eruption    Egg      Other reaction(s): Abdominal bloating    Sulfa (sulfonamide antibiotics) Rash       Family History   Problem Relation Age of Onset    Heart disease Mother     Hypertension Mother     Hypertension Father     Heart disease Father     Hypertension Sister     Crohn's disease Brother        Social History     Socioeconomic History    Marital status:    Tobacco Use    Smoking status: Never     Passive exposure: Never    Smokeless tobacco: Never   Substance and Sexual Activity    Alcohol use: Not Currently     Comment: occasionally    Drug use: No    Sexual activity: Yes     Partners: Male           Review of Systems:    Constitution: Negative for chills, fever, and sweats.  Negative for unexplained weight loss.    HENT:  Negative for headaches and blurry vision.    Cardiovascular:Negative for chest pain or irregular heart beat. Negative for hypertension.    Respiratory:  Negative for  cough and shortness of breath.    Gastrointestinal: Negative for abdominal pain, heartburn, melena, nausea, and vomitting.    Genitourinary:  Negative bladder incontinence and dysuria.    Musculoskeletal:  See HPI    Neurological: Negative for numbness.    Psychiatric/Behavioral: Negative for depression.  The patient is not nervous/anxious.      Endocrine: Negative for polyuria    Hematologic/Lymphatic: Negative for bleeding problem.  Does not bruise/bleed easily.    Skin: Negative for poor would healing and rash      Physical Examination:    Vital Signs:    Vitals:    06/08/23 1313   BP: 122/85   Pulse: 82       Body mass index is 26.16 kg/m².    General: No acute distress, alert and oriented, healthy appearing    HEENT: Head is atraumatic, mucous membranes are moist    Neck: Supples, no JVD    Cardiovascular: Palpable dorsalis pedis and posterior tibial pulses, regular rate and rhythm to those pulses    Lungs: Breathing non-labored    Skin: no rashes appreciated    Neurologic: Can flex and extend knees, ankles, and toes. Sensation is grossly intact    Left hip:  Range of motion left hip is well-maintained.  She has brisk cap refill distally.  Sensation intact distally.  Positive Stinchfield.  Does have tenderness over her ischial tuberosity    X-rays:      Assessment::  Acetabular labral tear versus hamstring strain/tear    Plan:  Discussed all treatment with the patient.  She is tried physical therapy.  She is tried activity modification.  We will plan to get an MR arthrogram of her L hip.    This note was created using [employer] voice recognition software that occasionally misinterpreted phrases or words.    Consult note is delivered via Epic messaging service.

## 2023-06-20 ENCOUNTER — DOCUMENTATION ONLY (OUTPATIENT)
Dept: ORTHOPEDICS | Facility: CLINIC | Age: 38
End: 2023-06-20
Payer: MEDICAID

## 2023-06-20 NOTE — PROGRESS NOTES
Peer to Peer completed with Dr Sami Larose for case [id number], with approval for an MR arthrogram of the left hip. Approval number [id number] with an expiration date 08/04/2023.

## 2023-06-30 ENCOUNTER — OFFICE VISIT (OUTPATIENT)
Dept: ORTHOPEDICS | Facility: CLINIC | Age: 38
End: 2023-06-30
Payer: COMMERCIAL

## 2023-06-30 VITALS
WEIGHT: 140 LBS | DIASTOLIC BLOOD PRESSURE: 92 MMHG | HEART RATE: 85 BPM | SYSTOLIC BLOOD PRESSURE: 126 MMHG | TEMPERATURE: 101 F | BODY MASS INDEX: 25.76 KG/M2 | HEIGHT: 62 IN

## 2023-06-30 DIAGNOSIS — M54.16 LUMBAR RADICULOPATHY, CHRONIC: Primary | ICD-10-CM

## 2023-06-30 PROCEDURE — 99213 OFFICE O/P EST LOW 20 MIN: CPT | Mod: ,,, | Performed by: ORTHOPAEDIC SURGERY

## 2023-06-30 PROCEDURE — 3074F PR MOST RECENT SYSTOLIC BLOOD PRESSURE < 130 MM HG: ICD-10-PCS | Mod: CPTII,,, | Performed by: ORTHOPAEDIC SURGERY

## 2023-06-30 PROCEDURE — 3008F BODY MASS INDEX DOCD: CPT | Mod: CPTII,,, | Performed by: ORTHOPAEDIC SURGERY

## 2023-06-30 PROCEDURE — 99213 PR OFFICE/OUTPT VISIT, EST, LEVL III, 20-29 MIN: ICD-10-PCS | Mod: ,,, | Performed by: ORTHOPAEDIC SURGERY

## 2023-06-30 PROCEDURE — 1159F PR MEDICATION LIST DOCUMENTED IN MEDICAL RECORD: ICD-10-PCS | Mod: CPTII,,, | Performed by: ORTHOPAEDIC SURGERY

## 2023-06-30 PROCEDURE — 3008F PR BODY MASS INDEX (BMI) DOCUMENTED: ICD-10-PCS | Mod: CPTII,,, | Performed by: ORTHOPAEDIC SURGERY

## 2023-06-30 PROCEDURE — 3080F DIAST BP >= 90 MM HG: CPT | Mod: CPTII,,, | Performed by: ORTHOPAEDIC SURGERY

## 2023-06-30 PROCEDURE — 1159F MED LIST DOCD IN RCRD: CPT | Mod: CPTII,,, | Performed by: ORTHOPAEDIC SURGERY

## 2023-06-30 PROCEDURE — 3080F PR MOST RECENT DIASTOLIC BLOOD PRESSURE >= 90 MM HG: ICD-10-PCS | Mod: CPTII,,, | Performed by: ORTHOPAEDIC SURGERY

## 2023-06-30 PROCEDURE — 3074F SYST BP LT 130 MM HG: CPT | Mod: CPTII,,, | Performed by: ORTHOPAEDIC SURGERY

## 2023-06-30 NOTE — PROGRESS NOTES
Chief Complaint:   Chief Complaint   Patient presents with    Results     MRI results Arthrogram Left hip       History of present illness:  37-YEAR-OLD FEMALE PRESENTS FOR EVALUATION FOLLOW-UP OF LEFT HIP MRI.  PATIENT CONTINUES TO HAVE PAIN TO HER HIPS WHAT SHE DESCRIBES MAINLY LATERALLY BILATERALLY.  IT IS WORSE WITH ACTIVITY.  IMPROVED BY REST.  HAS TRIED ANTI-INFLAMMATORIES.  HAS TRIED ACTIVITY MODIFICATION.  SHE IS HERE TODAY TO GO OVER MRI RESULTS.  DOES HAVE SOME PAIN IN HER LUMBAR SPINE.  THIS HAS RESPONDED TO ADJUSTMENTS AS WELL.    Past Medical History:   Diagnosis Date    Allergy     Fibromyalgia     IBS (irritable bowel syndrome)        Past Surgical History:   Procedure Laterality Date    COLONOSCOPY  11/25/2020    Dr Elaine Daley    UPPER GASTROINTESTINAL ENDOSCOPY         Current Outpatient Medications   Medication Sig    ACETAMINOPHEN-CAFFEINE ORAL Take 1 tablet by mouth as needed.    ALPRAZolam (XANAX) 0.5 MG tablet Take 1 tablet (0.5 mg total) by mouth as needed for Anxiety.    azelastine (ASTELIN) 137 mcg (0.1 %) nasal spray 2 sprays by Nasal route 2 (two) times daily.    baclofen (LIORESAL) 10 MG tablet Take 1 tablet (10 mg total) by mouth every evening.    desvenlafaxine succinate (PRISTIQ) 50 MG Tb24 Take 1 tablet (50 mg total) by mouth once daily.    fluticasone propionate (FLONASE) 50 mcg/actuation nasal spray 2 sprays by Each Nostril route 2 (two) times daily.    HYDROcodone-acetaminophen (NORCO) 5-325 mg per tablet Take 1 tablet by mouth every 6 (six) hours as needed for Pain.    INV TESTOSTERONE/ANASTROZOL OR PLACEBO PELLETS Inject 1 Pellet into the skin every 3 (three) months. FOR INVESTIGATIONAL USE ONLY    ketorolac (TORADOL) 10 mg tablet Take 1 tablet (10 mg total) by mouth every 6 (six) hours as needed for Pain.    montelukast (SINGULAIR) 10 mg tablet Take 10 mg by mouth once daily.    niacin 500 MG CpSR Take 250 mg by mouth every evening.    promethazine (PHENERGAN) 25 MG  tablet Take 25 mg by mouth as needed.    tramadol-acetaminophen 37.5-325 mg (ULTRACET) 37.5-325 mg Tab Take 1 tablet by mouth every 6 (six) hours as needed for Pain.    traZODone (DESYREL) 50 MG tablet Take 1 tablet (50 mg total) by mouth nightly.    turmeric root extract 500 mg Tab Take by mouth.    ZOLMitriptan (ZOMIG) 5 MG tablet Take 1 tablet (5 mg total) by mouth as needed for Migraine (1 pill when the migraine starts, may repeat in 2 h if needed. max 2 in 24 hours.).     No current facility-administered medications for this visit.       Review of patient's allergies indicates:   Allergen Reactions    Lactase      Other reaction(s): Inflammation , bloating , cramps    Sulfabenzamide      Cutaneous eruption    Sulfacetamide      Cutaneous eruption    Sulfamethoxazole-trimethoprim      Other reaction(s): Abnormal vaginal bleeding    Sulfathiazole      Cutaneous eruption    Egg      Other reaction(s): Abdominal bloating    Sulfa (sulfonamide antibiotics) Rash       Family History   Problem Relation Age of Onset    Heart disease Mother     Hypertension Mother     Hypertension Father     Heart disease Father     Hypertension Sister     Crohn's disease Brother        Social History     Socioeconomic History    Marital status:    Tobacco Use    Smoking status: Never     Passive exposure: Never    Smokeless tobacco: Never   Substance and Sexual Activity    Alcohol use: Not Currently     Comment: occasionally    Drug use: No    Sexual activity: Yes     Partners: Male           Review of Systems:    Constitution: Negative for chills, fever, and sweats.  Negative for unexplained weight loss.    HENT:  Negative for headaches and blurry vision.    Cardiovascular:Negative for chest pain or irregular heart beat. Negative for hypertension.    Respiratory:  Negative for cough and shortness of breath.    Gastrointestinal: Negative for abdominal pain, heartburn, melena, nausea, and vomitting.    Genitourinary:  Negative  bladder incontinence and dysuria.    Musculoskeletal:  See HPI    Neurological: Negative for numbness.    Psychiatric/Behavioral: Negative for depression.  The patient is not nervous/anxious.      Endocrine: Negative for polyuria    Hematologic/Lymphatic: Negative for bleeding problem.  Does not bruise/bleed easily.    Skin: Negative for poor would healing and rash      Physical Examination:    Vital Signs:    Vitals:    06/30/23 0901   BP: (!) 126/92   Pulse: 85   Temp: (!) 100.8 °F (38.2 °C)       Body mass index is 25.61 kg/m².    General: No acute distress, alert and oriented, healthy appearing    HEENT: Head is atraumatic, mucous membranes are moist    Neck: Supples, no JVD    Cardiovascular: Palpable dorsalis pedis and posterior tibial pulses, regular rate and rhythm to those pulses    Lungs: Breathing non-labored    Skin: no rashes appreciated    Neurologic: Can flex and extend knees, ankles, and toes. Sensation is grossly intact    Bilateral hips:  Range of motion of both hips without significant or severe pain.  She has brisk capillary refill distally.  Sensation intact distally.    X-rays:  MRI reviewed.  MRI was negative for a appreciable abnormality     Assessment::  SI joint dysfunction versus low back pain.    Plan:  Discussed all treatment options the patient.  Does not appear to be coming from her left hip given the negative MRI.  We would like to refer across the yo to our pain management doctor to see if she feels that there is some asked if this may be coming from her low back or SI joint.    This note was created using [employer] voice recognition software that occasionally misinterpreted phrases or words.    Consult note is delivered via Epic messaging service.

## 2023-07-10 ENCOUNTER — OFFICE VISIT (OUTPATIENT)
Dept: RHEUMATOLOGY | Facility: CLINIC | Age: 38
End: 2023-07-10
Payer: COMMERCIAL

## 2023-07-10 VITALS
WEIGHT: 142.81 LBS | BODY MASS INDEX: 26.28 KG/M2 | TEMPERATURE: 99 F | HEART RATE: 66 BPM | SYSTOLIC BLOOD PRESSURE: 129 MMHG | OXYGEN SATURATION: 98 % | HEIGHT: 62 IN | DIASTOLIC BLOOD PRESSURE: 83 MMHG

## 2023-07-10 DIAGNOSIS — F32.A DEPRESSIVE DISORDER: ICD-10-CM

## 2023-07-10 DIAGNOSIS — K59.00 CONSTIPATION, UNSPECIFIED CONSTIPATION TYPE: ICD-10-CM

## 2023-07-10 DIAGNOSIS — G47.00 INSOMNIA, UNSPECIFIED TYPE: ICD-10-CM

## 2023-07-10 DIAGNOSIS — M79.7 FIBROMYALGIA: ICD-10-CM

## 2023-07-10 DIAGNOSIS — M05.9 SEROPOSITIVE RHEUMATOID ARTHRITIS: Primary | ICD-10-CM

## 2023-07-10 DIAGNOSIS — G43.001 MIGRAINE WITHOUT AURA AND WITH STATUS MIGRAINOSUS, NOT INTRACTABLE: ICD-10-CM

## 2023-07-10 DIAGNOSIS — R10.2 PAIN IN PELVIS: ICD-10-CM

## 2023-07-10 PROCEDURE — 3079F PR MOST RECENT DIASTOLIC BLOOD PRESSURE 80-89 MM HG: ICD-10-PCS | Mod: CPTII,S$GLB,, | Performed by: INTERNAL MEDICINE

## 2023-07-10 PROCEDURE — 3008F BODY MASS INDEX DOCD: CPT | Mod: CPTII,S$GLB,, | Performed by: INTERNAL MEDICINE

## 2023-07-10 PROCEDURE — 3008F PR BODY MASS INDEX (BMI) DOCUMENTED: ICD-10-PCS | Mod: CPTII,S$GLB,, | Performed by: INTERNAL MEDICINE

## 2023-07-10 PROCEDURE — 3074F SYST BP LT 130 MM HG: CPT | Mod: CPTII,S$GLB,, | Performed by: INTERNAL MEDICINE

## 2023-07-10 PROCEDURE — 99999 PR PBB SHADOW E&M-EST. PATIENT-LVL IV: ICD-10-PCS | Mod: PBBFAC,,, | Performed by: INTERNAL MEDICINE

## 2023-07-10 PROCEDURE — 3074F PR MOST RECENT SYSTOLIC BLOOD PRESSURE < 130 MM HG: ICD-10-PCS | Mod: CPTII,S$GLB,, | Performed by: INTERNAL MEDICINE

## 2023-07-10 PROCEDURE — 3079F DIAST BP 80-89 MM HG: CPT | Mod: CPTII,S$GLB,, | Performed by: INTERNAL MEDICINE

## 2023-07-10 PROCEDURE — 99999 PR PBB SHADOW E&M-EST. PATIENT-LVL IV: CPT | Mod: PBBFAC,,, | Performed by: INTERNAL MEDICINE

## 2023-07-10 PROCEDURE — 1159F MED LIST DOCD IN RCRD: CPT | Mod: CPTII,S$GLB,, | Performed by: INTERNAL MEDICINE

## 2023-07-10 PROCEDURE — 99214 PR OFFICE/OUTPT VISIT, EST, LEVL IV, 30-39 MIN: ICD-10-PCS | Mod: S$GLB,,, | Performed by: INTERNAL MEDICINE

## 2023-07-10 PROCEDURE — 99214 OFFICE O/P EST MOD 30 MIN: CPT | Mod: S$GLB,,, | Performed by: INTERNAL MEDICINE

## 2023-07-10 PROCEDURE — 1159F PR MEDICATION LIST DOCUMENTED IN MEDICAL RECORD: ICD-10-PCS | Mod: CPTII,S$GLB,, | Performed by: INTERNAL MEDICINE

## 2023-07-10 RX ORDER — TRAZODONE HYDROCHLORIDE 50 MG/1
50 TABLET ORAL NIGHTLY
Qty: 90 TABLET | Refills: 3 | Status: SHIPPED | OUTPATIENT
Start: 2023-07-10

## 2023-07-10 RX ORDER — NALTREXONE HYDROCHLORIDE 50 MG/1
100 TABLET, FILM COATED ORAL EVERY MORNING
Start: 2023-07-10

## 2023-07-10 RX ORDER — NALTREXONE HYDROCHLORIDE 50 MG/1
1 TABLET, FILM COATED ORAL EVERY MORNING
COMMUNITY
End: 2023-07-10 | Stop reason: SDUPTHER

## 2023-07-10 RX ORDER — PROMETHAZINE HYDROCHLORIDE 25 MG/1
25 TABLET ORAL DAILY PRN
Qty: 90 TABLET | Refills: 3 | Status: SHIPPED | OUTPATIENT
Start: 2023-07-10 | End: 2023-09-11

## 2023-07-10 RX ORDER — DESVENLAFAXINE SUCCINATE 50 MG/1
50 TABLET, EXTENDED RELEASE ORAL DAILY
Qty: 90 TABLET | Refills: 3 | Status: SHIPPED | OUTPATIENT
Start: 2023-07-10 | End: 2023-11-10 | Stop reason: SDUPTHER

## 2023-07-10 NOTE — PROGRESS NOTES
"Subjective:       Patient ID: Padmini La is a 37 y.o. female.    Chief Complaint: Follow-up (Follow up. Patient complains of pain on left upper thigh near ischium. Pain 5/10.)    The patient is complaining of joint pain involving the MCP PIP wrist elbow shoulders hips knees and ankles bilaterally.  The pain is 1/10 in intensity dull in quality and continuous.  That is associated with a morning stiffness lasting for  less than 60 minutes.  Is also having difficulty maintaining a good night of sleep.  This has been associated with myalgias.  Muscle aches are 3/10 in intensity dull in quality and continuous.  They are associated with fatigue.  No fever no chills no others.      Review of Systems   Constitutional:  Negative for appetite change, chills and fever.   HENT:  Negative for congestion, ear pain, mouth sores, nosebleeds and trouble swallowing.    Eyes:  Negative for photophobia and discharge.   Respiratory:  Negative for chest tightness and shortness of breath.    Cardiovascular:  Negative for chest pain.   Gastrointestinal:  Negative for abdominal pain and vomiting.   Endocrine: Negative.    Genitourinary:  Negative for hematuria.   Musculoskeletal:         As per HPI   Skin:  Negative for rash.   Neurological:  Negative for weakness.       Objective:   /83 (BP Location: Left arm, Patient Position: Sitting, BP Method: Medium (Automatic))   Pulse 66   Temp 98.6 °F (37 °C) (Oral)   Ht 5' 2" (1.575 m)   Wt 64.8 kg (142 lb 12.8 oz)   LMP 07/09/2023 (Exact Date)   SpO2 98%   BMI 26.12 kg/m²      Physical Exam   Constitutional: She is oriented to person, place, and time. She appears well-developed and well-nourished. No distress.   HENT:   Head: Normocephalic and atraumatic.   Right Ear: External ear normal.   Left Ear: External ear normal.   Eyes: Pupils are equal, round, and reactive to light.   Cardiovascular: Normal rate, regular rhythm and normal heart sounds.   Pulmonary/Chest: Breath sounds " normal.   Abdominal: Soft. There is no abdominal tenderness.   Musculoskeletal:      Right shoulder: Normal.      Left shoulder: Normal.      Right elbow: Normal.      Left elbow: Normal.      Right wrist: Normal.      Left wrist: Normal.      Cervical back: Neck supple.      Right hip: Normal.      Left hip: Normal.      Right knee: Normal.      Left knee: Normal.   Lymphadenopathy:     She has no cervical adenopathy.   Neurological: She is alert and oriented to person, place, and time. She displays normal reflexes. No cranial nerve deficit or sensory deficit. She exhibits normal muscle tone. Coordination normal.   Skin: No rash noted. No erythema.   Vitals reviewed.      Right Side Rheumatological Exam     Examination finds the shoulder, elbow, wrist, knee, 1st PIP, 1st MCP, 2nd PIP, 2nd MCP, 3rd PIP, 3rd MCP, 4th PIP, 4th MCP, 5th PIP, 5th MCP, temporomandibular, hip, ankle, 1st MTP, 2nd MTP, 3rd MTP, 4th MTP and 5th MTP normal.    Left Side Rheumatological Exam     Examination finds the shoulder, elbow, wrist, knee, 1st PIP, 1st MCP, 2nd PIP, 2nd MCP, 3rd PIP, 3rd MCP, 4th PIP, 4th MCP, 5th PIP, 5th MCP, temporomandibular, hip, ankle, 1st MTP, 2nd MTP, 3rd MTP, 4th MTP and 5th MTP normal.       Completed Fibromyalgia exam 11/18 tender points.  No data to display     Assessment:       1. Seropositive rheumatoid arthritis    2. Depressive disorder    3. Migraine without aura and with status migrainosus, not intractable    4. Constipation, unspecified constipation type    5. Pain in pelvis    6. Fibromyalgia    7. Insomnia, unspecified type          Medication List with Changes/Refills   Current Medications    ACETAMINOPHEN-CAFFEINE ORAL    Take 1 tablet by mouth as needed.       Start Date: 12/2/2021 End Date: --    ALPRAZOLAM (XANAX) 0.5 MG TABLET    Take 1 tablet (0.5 mg total) by mouth as needed for Anxiety.       Start Date: 3/8/2023  End Date: --    AZELASTINE (ASTELIN) 137 MCG (0.1 %) NASAL SPRAY    2 sprays  by Nasal route 2 (two) times daily.       Start Date: 3/20/2022 End Date: --    BACLOFEN (LIORESAL) 10 MG TABLET    Take 1 tablet (10 mg total) by mouth every evening.       Start Date: 3/8/2023  End Date: --    FLUTICASONE PROPIONATE (FLONASE) 50 MCG/ACTUATION NASAL SPRAY    2 sprays by Each Nostril route 2 (two) times daily.       Start Date: 6/25/2022 End Date: --    INV TESTOSTERONE/ANASTROZOL OR PLACEBO PELLETS    Inject 1 Pellet into the skin every 3 (three) months. FOR INVESTIGATIONAL USE ONLY       Start Date: --        End Date: --    KETOROLAC (TORADOL) 10 MG TABLET    Take 1 tablet (10 mg total) by mouth every 6 (six) hours as needed for Pain.       Start Date: 3/26/2023 End Date: --    MONTELUKAST (SINGULAIR) 10 MG TABLET    Take 10 mg by mouth once daily.       Start Date: 6/13/2022 End Date: --    NIACIN 500 MG CPSR    Take 250 mg by mouth every evening.       Start Date: --        End Date: --    TRAMADOL-ACETAMINOPHEN 37.5-325 MG (ULTRACET) 37.5-325 MG TAB    Take 1 tablet by mouth every 6 (six) hours as needed for Pain.       Start Date: 3/8/2023  End Date: --    TURMERIC ROOT EXTRACT 500 MG TAB    Take by mouth.       Start Date: --        End Date: --    ZOLMITRIPTAN (ZOMIG) 5 MG TABLET    Take 1 tablet (5 mg total) by mouth as needed for Migraine (1 pill when the migraine starts, may repeat in 2 h if needed. max 2 in 24 hours.).       Start Date: 11/8/2022 End Date: --   Changed and/or Refilled Medications    Modified Medication Previous Medication    DESVENLAFAXINE SUCCINATE (PRISTIQ) 50 MG TB24 desvenlafaxine succinate (PRISTIQ) 50 MG Tb24       Take 1 tablet (50 mg total) by mouth once daily.    Take 1 tablet (50 mg total) by mouth once daily.       Start Date: 7/10/2023 End Date: 7/9/2024    Start Date: 3/8/2023  End Date: 7/10/2023    NALTREXONE (DEPADE) 50 MG TABLET naltrexone (DEPADE) 50 mg tablet       Take 100 mg by mouth every morning. Taking 100mg one day then 50 mg the next   She is  alternating between 100 mg and 50 mg very other day    Take 1 tablet by mouth every morning. Taking 100mg one day then 50 mg the next   She is alternating between 100 mg and 50 mg very other day       Start Date: 7/10/2023 End Date: --    Start Date: --        End Date: 7/10/2023    PROMETHAZINE (PHENERGAN) 25 MG TABLET promethazine (PHENERGAN) 25 MG tablet       Take 1 tablet (25 mg total) by mouth daily as needed for Nausea.    Take 25 mg by mouth as needed.       Start Date: 7/10/2023 End Date: --    Start Date: 11/18/2021End Date: 7/10/2023    TRAZODONE (DESYREL) 50 MG TABLET traZODone (DESYREL) 50 MG tablet       Take 1 tablet (50 mg total) by mouth nightly.    Take 1 tablet (50 mg total) by mouth nightly.       Start Date: 7/10/2023 End Date: --    Start Date: 3/8/2023  End Date: 7/10/2023   Discontinued Medications    HYDROCODONE-ACETAMINOPHEN (NORCO) 5-325 MG PER TABLET    Take 1 tablet by mouth every 6 (six) hours as needed for Pain.       Start Date: 3/26/2023 End Date: 7/10/2023         Plan:         Problem List Items Addressed This Visit          Neuro    Migraine without aura with status migrainosus    Relevant Medications    desvenlafaxine succinate (PRISTIQ) 50 MG Tb24    naltrexone (DEPADE) 50 mg tablet    promethazine (PHENERGAN) 25 MG tablet    traZODone (DESYREL) 50 MG tablet       Psychiatric    Depressive disorder    Relevant Medications    desvenlafaxine succinate (PRISTIQ) 50 MG Tb24    naltrexone (DEPADE) 50 mg tablet    promethazine (PHENERGAN) 25 MG tablet    traZODone (DESYREL) 50 MG tablet       Immunology/Multi System    Seropositive rheumatoid arthritis - Primary    Relevant Medications    desvenlafaxine succinate (PRISTIQ) 50 MG Tb24    naltrexone (DEPADE) 50 mg tablet    promethazine (PHENERGAN) 25 MG tablet    traZODone (DESYREL) 50 MG tablet       GI    Constipation    Relevant Medications    desvenlafaxine succinate (PRISTIQ) 50 MG Tb24    naltrexone (DEPADE) 50 mg tablet     promethazine (PHENERGAN) 25 MG tablet    traZODone (DESYREL) 50 MG tablet    Pain in pelvis    Relevant Medications    desvenlafaxine succinate (PRISTIQ) 50 MG Tb24    naltrexone (DEPADE) 50 mg tablet    promethazine (PHENERGAN) 25 MG tablet    traZODone (DESYREL) 50 MG tablet       Orthopedic    Fibromyalgia    Relevant Medications    desvenlafaxine succinate (PRISTIQ) 50 MG Tb24    naltrexone (DEPADE) 50 mg tablet    promethazine (PHENERGAN) 25 MG tablet    traZODone (DESYREL) 50 MG tablet       Other    Insomnia    Relevant Medications    desvenlafaxine succinate (PRISTIQ) 50 MG Tb24    naltrexone (DEPADE) 50 mg tablet    promethazine (PHENERGAN) 25 MG tablet    traZODone (DESYREL) 50 MG tablet

## 2023-07-27 ENCOUNTER — OFFICE VISIT (OUTPATIENT)
Dept: PAIN MEDICINE | Facility: CLINIC | Age: 38
End: 2023-07-27
Payer: COMMERCIAL

## 2023-07-27 VITALS
HEART RATE: 82 BPM | TEMPERATURE: 98 F | SYSTOLIC BLOOD PRESSURE: 130 MMHG | BODY MASS INDEX: 26.13 KG/M2 | WEIGHT: 142 LBS | HEIGHT: 62 IN | DIASTOLIC BLOOD PRESSURE: 93 MMHG

## 2023-07-27 DIAGNOSIS — M54.9 DORSALGIA, UNSPECIFIED: ICD-10-CM

## 2023-07-27 DIAGNOSIS — M25.552 LEFT HIP PAIN: Primary | ICD-10-CM

## 2023-07-27 PROCEDURE — 3008F BODY MASS INDEX DOCD: CPT | Mod: CPTII,,, | Performed by: NURSE PRACTITIONER

## 2023-07-27 PROCEDURE — 1159F PR MEDICATION LIST DOCUMENTED IN MEDICAL RECORD: ICD-10-PCS | Mod: CPTII,,, | Performed by: NURSE PRACTITIONER

## 2023-07-27 PROCEDURE — 3080F PR MOST RECENT DIASTOLIC BLOOD PRESSURE >= 90 MM HG: ICD-10-PCS | Mod: CPTII,,, | Performed by: NURSE PRACTITIONER

## 2023-07-27 PROCEDURE — 3075F SYST BP GE 130 - 139MM HG: CPT | Mod: CPTII,,, | Performed by: NURSE PRACTITIONER

## 2023-07-27 PROCEDURE — 3008F PR BODY MASS INDEX (BMI) DOCUMENTED: ICD-10-PCS | Mod: CPTII,,, | Performed by: NURSE PRACTITIONER

## 2023-07-27 PROCEDURE — 1159F MED LIST DOCD IN RCRD: CPT | Mod: CPTII,,, | Performed by: NURSE PRACTITIONER

## 2023-07-27 PROCEDURE — 99204 OFFICE O/P NEW MOD 45 MIN: CPT | Mod: ,,, | Performed by: NURSE PRACTITIONER

## 2023-07-27 PROCEDURE — 3075F PR MOST RECENT SYSTOLIC BLOOD PRESS GE 130-139MM HG: ICD-10-PCS | Mod: CPTII,,, | Performed by: NURSE PRACTITIONER

## 2023-07-27 PROCEDURE — 1160F RVW MEDS BY RX/DR IN RCRD: CPT | Mod: CPTII,,, | Performed by: NURSE PRACTITIONER

## 2023-07-27 PROCEDURE — 3080F DIAST BP >= 90 MM HG: CPT | Mod: CPTII,,, | Performed by: NURSE PRACTITIONER

## 2023-07-27 PROCEDURE — 99204 PR OFFICE/OUTPT VISIT, NEW, LEVL IV, 45-59 MIN: ICD-10-PCS | Mod: ,,, | Performed by: NURSE PRACTITIONER

## 2023-07-27 PROCEDURE — 1160F PR REVIEW ALL MEDS BY PRESCRIBER/CLIN PHARMACIST DOCUMENTED: ICD-10-PCS | Mod: CPTII,,, | Performed by: NURSE PRACTITIONER
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
## 2023-08-17 ENCOUNTER — OFFICE VISIT (OUTPATIENT)
Dept: PAIN MEDICINE | Facility: CLINIC | Age: 38
End: 2023-08-17
Payer: COMMERCIAL

## 2023-08-17 VITALS
DIASTOLIC BLOOD PRESSURE: 88 MMHG | BODY MASS INDEX: 25.24 KG/M2 | TEMPERATURE: 98 F | HEART RATE: 65 BPM | SYSTOLIC BLOOD PRESSURE: 130 MMHG | WEIGHT: 138 LBS

## 2023-08-17 DIAGNOSIS — M51.36 ANNULAR TEAR OF LUMBAR DISC: Primary | ICD-10-CM

## 2023-08-17 DIAGNOSIS — M54.16 LUMBAR RADICULOPATHY: ICD-10-CM

## 2023-08-17 DIAGNOSIS — M51.36 BULGE OF LUMBAR DISC WITHOUT MYELOPATHY: ICD-10-CM

## 2023-08-17 PROCEDURE — 3008F BODY MASS INDEX DOCD: CPT | Mod: CPTII,,, | Performed by: NURSE PRACTITIONER

## 2023-08-17 PROCEDURE — 3079F DIAST BP 80-89 MM HG: CPT | Mod: CPTII,,, | Performed by: NURSE PRACTITIONER

## 2023-08-17 PROCEDURE — 1159F PR MEDICATION LIST DOCUMENTED IN MEDICAL RECORD: ICD-10-PCS | Mod: CPTII,,, | Performed by: NURSE PRACTITIONER

## 2023-08-17 PROCEDURE — 3008F PR BODY MASS INDEX (BMI) DOCUMENTED: ICD-10-PCS | Mod: CPTII,,, | Performed by: NURSE PRACTITIONER

## 2023-08-17 PROCEDURE — 99214 PR OFFICE/OUTPT VISIT, EST, LEVL IV, 30-39 MIN: ICD-10-PCS | Mod: ,,, | Performed by: NURSE PRACTITIONER

## 2023-08-17 PROCEDURE — 99214 OFFICE O/P EST MOD 30 MIN: CPT | Mod: ,,, | Performed by: NURSE PRACTITIONER

## 2023-08-17 PROCEDURE — 3075F SYST BP GE 130 - 139MM HG: CPT | Mod: CPTII,,, | Performed by: NURSE PRACTITIONER

## 2023-08-17 PROCEDURE — 3079F PR MOST RECENT DIASTOLIC BLOOD PRESSURE 80-89 MM HG: ICD-10-PCS | Mod: CPTII,,, | Performed by: NURSE PRACTITIONER

## 2023-08-17 PROCEDURE — 3075F PR MOST RECENT SYSTOLIC BLOOD PRESS GE 130-139MM HG: ICD-10-PCS | Mod: CPTII,,, | Performed by: NURSE PRACTITIONER

## 2023-08-17 PROCEDURE — 1160F PR REVIEW ALL MEDS BY PRESCRIBER/CLIN PHARMACIST DOCUMENTED: ICD-10-PCS | Mod: CPTII,,, | Performed by: NURSE PRACTITIONER

## 2023-08-17 PROCEDURE — 1159F MED LIST DOCD IN RCRD: CPT | Mod: CPTII,,, | Performed by: NURSE PRACTITIONER

## 2023-08-17 PROCEDURE — 1160F RVW MEDS BY RX/DR IN RCRD: CPT | Mod: CPTII,,, | Performed by: NURSE PRACTITIONER

## 2023-08-17 NOTE — PROGRESS NOTES
Subjective:      Patient ID: Padmini La is a 38 y.o. female.    Chief Complaint: Back Pain (L-spine MRI results today, pt states nothing has changed since her last appointment, prescription & OTC  medication for releif,  pain level 3/10)    Referred by: No ref. provider found     HPI:  This is a pleasant 38-year-old female patient presenting as a follow-up for pain associated with left greater than right hip pain after completing MRI of her lumbar spine.     In May she pivoted wrong while playing ball with her kids and had severe pain with bilateral hips and was unable to walk temporarily during that event.  She was able to ambulate shortly after.  She is also seen a massage therapist was able to reduce her pain.     Pain feels like a burning knife type sensation that is also achy in will spasm at times.  Her pain occurs daily and is increased with prolonged standing chores and playing with the kids.  She was unsure how to rate her pain level during these activities.  She notices her pain is reduced when she goes to the gym and does resistance training and lunges will decrease her pain as it feels like this resets everything. .  She has intermittent groin pain left greater than right.  She also has bilateral hip pain left greater than right and bilateral buttock pain with intermittent radiation down bilateral legs on the lateral side that stops at the posterior knee.(L5) Pain will also radiate intermittently to top of both thighs (L4) and intermittent radiation to bilateral inner thighs. Sometimes the locations of her pain changes down the leg.  Pain score today is 2/10.  Pain will reduce when she works with a massage therapist in his lying down.     She has never completed epidural steroid injections.  No history of an implanted pacemaker, defibrillator or spinal cord stimulator.          Vital signs:   Vitals:    08/17/23 1307   BP: 130/88   Pulse: 65   Temp: 97.9 °F (36.6 °C)   TempSrc: Oral   Weight: 62.6 kg  (138 lb)   PainSc:   3     Body mass index is 25.24 kg/m².  Pain Disability Index (PDI): 35       Interventional Pain History  None    ROS:  Low back and leg pain    MRI lumbar spine 2023     FINDINGS:  There are 5 non-rib-bearing lumbar type vertebral bodies.  Lumbar spine alignment is anatomic without spondylolisthesis or pars interarticularis fracture.  The T12 and lumbar vertebral bodies are normal in morphology and there is no acute or chronic fracture.  There is a hemangioma at the superior L1 vertebral body with no additional osseous lesion or marrow signal abnormality.  The visualized distal thoracic spinal cord and conus are normal with the conus terminating at the upper L1 vertebral body level.  No paravertebral soft tissue abnormality is identified.  Additional changes of the spine on a level by level basis are as follows:     T12-L1: Normal on sagittal imaging.     L1-L2 through L3-L4: No significant abnormality or stenosis.     L4-L5: Minimal bilateral degenerative facet change with no additional abnormality and no stenosis.     L5-S1: Intervertebral disc desiccation and mild-to-moderate disc space narrowing.  Shallow broad-based central disc protrusion measuring 4 x 13 mm in AP and TV extent and effacing the ventral epidural fat with no significant impingement upon the ventral thecal sac.  A posterior peripheral annular fissure is suggested.  There is also minimal bilateral degenerative facet change.  No spinal canal or lateral recess stenosis and no neural foramen stenosis.  The visualized sacrum and sacroiliac joints are normal.     Impression:     Degenerative disc disease at L5-S1 with shallow broad-based central disc protrusion and posterior annular fissure suggested.     Minimal bilateral degenerative facet change at L4-S1.     No spinal canal, lateral recess, or neural foramen stenosis.           Objective:          Physical Exam  General: Well developed; overweight; A&O x 3; No  anxiety/depression; NAD  Mental Status: Oriented to person, palce and time. Displays appropriate mood & affect.  Head: Norm cephalic and atraumatic  Neck:  No cervical paraspinal banding.  Full range of motion with lateral turning and cervical flexion +extension.  Eyes: normal conjunctiva, normal lids, normal pupils  ENT and mouth: normal external ear, nose, and no lesions noted on the lips.  Respiratory: Symmetrical, Unlabored. No dyspnea  CV: normal rhythm and rate. No peripheral edema.   Abdomen: Non-distended     Extremities:  Gen: No cyanosis or tenderness to palpation bilateral upper and lower extremities  Skin: Warm, pink, dry, no rashes, no lesions on the lumbar spine  Strength: 5/5 motor strength bilateral upper and lower extremities  ROM: Full ROM in bilateral knees and ankles without pain or instability.     Neuro:  Gait: no altalgic lean, normal toe and heel raise. Independent ambulator.  DTR's: 2+ in bilateral patellar, and ankle  Sensory: Intact to light touch bilateral  upper and lower extremities     Spine: Normal lordosis. No scoliosis  L-spine ROM: Full ROM to flexion, extension, bilateral rotation,   Straight Leg Raise:  Positive right, positive left  SI Joint: No tenderness to palpation bilaterally.      Assessment:     PT order placed to evaluate and treat L5 + S1 annular fissure and lumbar disc bulge and DDD for 2-3 times a week for 6-8 weeks to include ionophoresis  along with manual PT + exercises.      A posterior peripheral annular fissure is suggested at L5/S1 per MRI L spine findings along  with a disc bulge.       Patient requested Neurosurgery evaluation for their opinion on treatment. Referral sent to Dr. Cynthia Mccullough for her opinion to treat pain.     Follow up with me in 8 weeks       Encounter Diagnoses   Name Primary?    Lumbar radiculopathy     Annular tear of lumbar disc Yes    Bulge of lumbar disc without myelopathy          Plan:       Padmini was seen today for back  pain.    Diagnoses and all orders for this visit:    Annular tear of lumbar disc  -     Ambulatory referral/consult to Neurosurgery; Future  -     Ambulatory referral/consult to Physical/Occupational Therapy; Future    Lumbar radiculopathy  -     Ambulatory referral/consult to Neurosurgery; Future  -     Ambulatory referral/consult to Physical/Occupational Therapy; Future    Bulge of lumbar disc without myelopathy  -     Ambulatory referral/consult to Physical/Occupational Therapy; Future

## 2023-08-21 ENCOUNTER — TELEPHONE (OUTPATIENT)
Dept: NEUROSURGERY | Facility: CLINIC | Age: 38
End: 2023-08-21
Payer: COMMERCIAL

## 2023-08-21 NOTE — TELEPHONE ENCOUNTER
"Aimee, this patient is being referred to us. I did want to send this to you just incase. Reviewing MRI report, there is no spinal canal stenosis but " L5-S1: Intervertebral disc desiccation and mild-to-moderate disc space narrowing.  Shallow broad-based central disc protrusion measuring 4 x 13 mm in AP and TV extent and effacing the ventral epidural fat with no significant impingement upon the ventral thecal sac.  A posterior peripheral annular fissure is suggested.  There is also minimal bilateral degenerative facet change.  No spinal canal or lateral recess stenosis and no neural foramen stenosis." Please review and advise on scheduling. Thank you  "

## 2023-08-22 DIAGNOSIS — M54.16 LUMBAR RADICULOPATHY: Primary | ICD-10-CM

## 2023-08-22 NOTE — TELEPHONE ENCOUNTER
Please schedule the patient sooner rather than later on either my or Rachel's schedule with AP/Lat/Flex/Ext views of the lumbar spine. Her MRI of the lumbar spine looks pretty good. We may move forward with thoracic and/or cervical imaging following her examination. Thanks

## 2023-08-22 NOTE — TELEPHONE ENCOUNTER
I spoke with patient. I did advise that Aimee would like to see her. I scheduled her with Aimee for 8/30/23 at 3:00, XR at [facility] for 2:00. She was compliant w date and time.

## 2023-08-25 ENCOUNTER — OFFICE VISIT (OUTPATIENT)
Dept: GASTROENTEROLOGY | Facility: CLINIC | Age: 38
End: 2023-08-25
Payer: COMMERCIAL

## 2023-08-25 VITALS
SYSTOLIC BLOOD PRESSURE: 116 MMHG | RESPIRATION RATE: 16 BRPM | DIASTOLIC BLOOD PRESSURE: 83 MMHG | BODY MASS INDEX: 25.95 KG/M2 | WEIGHT: 141 LBS | TEMPERATURE: 98 F | OXYGEN SATURATION: 99 % | HEART RATE: 68 BPM | HEIGHT: 62 IN

## 2023-08-25 DIAGNOSIS — K58.1 IRRITABLE BOWEL SYNDROME WITH CONSTIPATION: Primary | ICD-10-CM

## 2023-08-25 PROCEDURE — 99214 PR OFFICE/OUTPT VISIT, EST, LEVL IV, 30-39 MIN: ICD-10-PCS | Mod: S$PBB,,, | Performed by: NURSE PRACTITIONER

## 2023-08-25 PROCEDURE — 3074F PR MOST RECENT SYSTOLIC BLOOD PRESSURE < 130 MM HG: ICD-10-PCS | Mod: CPTII,,, | Performed by: NURSE PRACTITIONER

## 2023-08-25 PROCEDURE — 99215 OFFICE O/P EST HI 40 MIN: CPT | Mod: PBBFAC | Performed by: NURSE PRACTITIONER

## 2023-08-25 PROCEDURE — 99214 OFFICE O/P EST MOD 30 MIN: CPT | Mod: S$PBB,,, | Performed by: NURSE PRACTITIONER

## 2023-08-25 PROCEDURE — 3008F BODY MASS INDEX DOCD: CPT | Mod: CPTII,,, | Performed by: NURSE PRACTITIONER

## 2023-08-25 PROCEDURE — 3074F SYST BP LT 130 MM HG: CPT | Mod: CPTII,,, | Performed by: NURSE PRACTITIONER

## 2023-08-25 PROCEDURE — 3079F DIAST BP 80-89 MM HG: CPT | Mod: CPTII,,, | Performed by: NURSE PRACTITIONER

## 2023-08-25 PROCEDURE — 1160F PR REVIEW ALL MEDS BY PRESCRIBER/CLIN PHARMACIST DOCUMENTED: ICD-10-PCS | Mod: CPTII,,, | Performed by: NURSE PRACTITIONER

## 2023-08-25 PROCEDURE — 3079F PR MOST RECENT DIASTOLIC BLOOD PRESSURE 80-89 MM HG: ICD-10-PCS | Mod: CPTII,,, | Performed by: NURSE PRACTITIONER

## 2023-08-25 PROCEDURE — 1159F PR MEDICATION LIST DOCUMENTED IN MEDICAL RECORD: ICD-10-PCS | Mod: CPTII,,, | Performed by: NURSE PRACTITIONER

## 2023-08-25 PROCEDURE — 1160F RVW MEDS BY RX/DR IN RCRD: CPT | Mod: CPTII,,, | Performed by: NURSE PRACTITIONER

## 2023-08-25 PROCEDURE — 1159F MED LIST DOCD IN RCRD: CPT | Mod: CPTII,,, | Performed by: NURSE PRACTITIONER

## 2023-08-25 PROCEDURE — 3008F PR BODY MASS INDEX (BMI) DOCUMENTED: ICD-10-PCS | Mod: CPTII,,, | Performed by: NURSE PRACTITIONER

## 2023-08-25 RX ORDER — PLECANATIDE 3 MG/1
3 TABLET ORAL DAILY
Qty: 30 TABLET | Refills: 5 | Status: SHIPPED | OUTPATIENT
Start: 2023-08-25

## 2023-08-25 NOTE — PROGRESS NOTES
Subjective:       Patient ID: Padmini La is a 38 y.o. female.    Chief Complaint: Irritable Bowel Syndrome (No complaints.)    This 38-year-old  female with a history of fibromyalgia, migraine headaches, and chronic constipation presents unaccompanied for a follow-up visit.  She was initially seen in the clinic July 11, 2019, referred for IBS at the time.  She reported a long-standing history of chronic constipation that seemed to have worsened over the past few weeks.  She would typically have 1-2 bowel movements per week.  She reported straining and sitting on the toilet for long periods of time.  She had noted gas and bloating when she went more than 2 to 3 days without a bowel movement and with intake of gluten and dairy products.  She also reported intermittent generalized abdominal pain that was relieved with defecation majority of the time.  She had tried OTC MiraLAX, milk of magnesia, and senna with minimal relief noted.  She reported being tried on Linzess 72 mcg daily for approximately 1 year with moderate relief noted.  She was taking a daily probiotic.  Her appetite was good.  She reported difficulty losing weight over the past several months despite dietary and lifestyle modifications.  She denied nocturnal symptoms, fever, chills, nausea, vomiting, odynophagia, dysphagia, acid reflux, heartburn, belching, or early satiety.  She denied melena, hematochezia, fecal urgency, fecal incontinence, or pain with defecation.     She was recommended lifestyle and dietary modifications, as well as increasing fluid and fiber intake.  I advised that she continue her daily probiotic.  She was prescribed hyoscyamine 0.125 mg every 8 hours as needed for abdominal pain, Linzess 145 mcg daily, and simethicone 250 mg twice daily as needed for gas.  Laboratory results July 17, 2019 revealed unremarkable CMP, ferritin, hemoglobin A1c, vitamin D, TSH, and CBC.  Abdominal x-ray July 17, 2019 revealed findings  consistent with constipation.  She was recommended an initial colon cleanse with 1-2 bottles of magnesium citrate followed by maintenance with previously recommended regimen.     She was seen in the clinic for a follow-up visit October 16, 2019.  She reported intermittent acid reflux and was taking OTC Tums approximately once weekly with moderate relief noted.  She had occasional bloating and belching.  She continued to take a daily probiotic and simethicone as needed with moderate relief noted.  Her appetite was good and her weight was stable.  She denied nocturnal symptoms, fever, chills, nausea, vomiting, odynophagia, dysphagia, belching, or early satiety.  She had intermittent lower abdominal cramps that were typically relieved with defecation and Levsin as needed.  She reported having to take Levsin approximately once monthly.  Her bowel regimen was consistent of a daily probiotic, Linzess 145 mcg daily, and senna.  She would typically have one bowel movement daily, but reported not feeling completely emptied at times.  She denied melena, hematochezia, fecal urgency, fecal incontinence, or pain with defecation.  She had decreased intake of dairy and bread.     She was recommended to increase Linzess to 290 mcg daily.  She was provided lifestyle and dietary modifications and encouraged to continue Levsin as needed and daily probiotic.  EGD was ordered, which she completed with Dr. Daley December 30, 2019 and resulted normal.     When seen in January 2020, she continued to take her daily probiotic, digestive enzymes, and Linzess 290 mcg daily.  Symptoms improved on her current medication regimen.  She typically had 1-2 bowel movements per day without melena, hematochezia, fecal urgency, fecal incontinence, or pain with defecation.  She would occasionally have bloating and gas.  Her appetite was good and her weight was stable.  She denied fever, chills, nausea, vomiting, odynophagia, dysphagia, acid reflux,  heartburn, belching, or early satiety.  She was recommended lifestyle and dietary modifications and advised to continue her medication regimen as directed.     When evaluated in June 2020, she continued to take Linzess 290 mcg daily, as well as a daily probiotic.  She also started taking senna again 2 days prior.  She reported decreased effectiveness of Linzess over the past 1-2 months and not feeling completely evacuated with defecation.  She would occasionally strain to have a bowel movement.  She had intermittent lower abdominal cramping that was relieved with defecation.  She had occasional bloating and gas.  Her appetite was good and her weight was stable.  She denied nocturnal symptoms, fever, chills, nausea, vomiting, odynophagia, dysphagia, acid reflux, heartburn, belching, or early satiety.  She denied melena, hematochezia, fecal urgency, fecal incontinence, or pain with defecation.  Laboratory results May 21, 2020 revealed GFR 87, total protein 8.4, globulin 4.50, and otherwise unremarkable CMP, free T4, TSH, CBC, CRP, and ESR.  She was advised to continue her current treatment regimen as directed     She underwent colonoscopy with Dr. Daley November 25, 2020 with findings of nonbleeding internal hemorrhoids and otherwise normal exam with a recommended recall of 10 years.     She was seen for a follow-up visit May 3, 2021.  She reported 1 bowel movement every 1 to 2 days as long as she was consistent with her whole 30 diet.  She did not always feel completely evacuated.  She avoided dairy and poultry.  She had occasional abdominal spasms and cramping that was relieved with Levsin as needed.  She had to take 2 Levsin tablets yesterday.  Her appetite was good and her weight was stable.  She denied nocturnal symptoms, fever, chills, nausea, vomiting, odynophagia, dysphagia, acid reflux, heartburn, early satiety, melena, hematochezia, fecal urgency, fecal incontinence, or pain with defecation.  She was  advised to start Motegrity 2 mg daily.     When evaluated via telemedicine visit September 3, 2021, she reported feeling well. She remained consistent on her whole 30 diet and avoided dairy and poultry. She had not had to take Levsin. She was drinking probiotic drinks and taking Trulance for constipation with good relief noted. She denied any problems at this time.     She was seen for a follow-up visit August 25, 2022. She reported minimal change in symptoms from previous telemedicine visit.    Today, she presents for a follow-up visit.  She reports feeling well aside from continued constipation over time.  She typically has 1 formed to harder stool every 2-3 days if she does not take something and dependent on what type of foods she eats.  She does not always feel completely evacuated.  She has occasional bloating, cramping, and gas.  She is currently taking a daily probiotic and doing a cleanse for her colon with some relief noted.  She denies melena, hematochezia, fecal urgency, fecal incontinence, or pain with defecation.  Her appetite is good and her weight is stable.  She denies fever, chills, nausea, vomiting, hematemesis, odynophagia, dysphagia, acid reflux, pyrosis, early satiety, or abdominal pain.  She has upcoming allergy testing to be done which we will follow results of.     She was evaluated by Dr. Montgomery several years ago and treated for IBS.  She also reports being tested for celiac disease in the past, which resulted negative.  She denies ever having a colonoscopy done.  She takes ibuprofen as needed approximately once weekly for headaches and denies use of blood thinners.  She denies tobacco or alcohol use.  Her brother and uncle have a history of Crohn's disease.      Review of patient's allergies indicates:   Allergen Reactions    Lactase      Other reaction(s): Inflammation , bloating , cramps    Sulfabenzamide      Cutaneous eruption    Sulfacetamide      Cutaneous eruption     Sulfamethoxazole-trimethoprim      Other reaction(s): Abnormal vaginal bleeding    Sulfathiazole      Cutaneous eruption    Egg      Other reaction(s): Abdominal bloating    Sulfa (sulfonamide antibiotics) Rash     Past Medical History:   Diagnosis Date    Allergy     Arthritis     Fibromyalgia     IBS (irritable bowel syndrome)      Past Surgical History:   Procedure Laterality Date    COLONOSCOPY  11/25/2020    Dr Elaine Daley    UPPER GASTROINTESTINAL ENDOSCOPY       Family History:   family history includes Crohn's disease in her brother; Heart disease in her father and mother; Hypertension in her father, mother, and sister.    Social History:    reports that she has never smoked. She has never been exposed to tobacco smoke. She has never used smokeless tobacco. She reports that she does not currently use alcohol. She reports that she does not use drugs.    Review of Systems  Negative except as noted in the HPI.      Objective:      Physical Exam  Constitutional:       Appearance: Normal appearance.   HENT:      Head: Normocephalic.      Mouth/Throat:      Mouth: Mucous membranes are moist.   Eyes:      Extraocular Movements: Extraocular movements intact.      Conjunctiva/sclera: Conjunctivae normal.      Pupils: Pupils are equal, round, and reactive to light.   Cardiovascular:      Rate and Rhythm: Normal rate and regular rhythm.      Pulses: Normal pulses.      Heart sounds: Normal heart sounds.   Pulmonary:      Effort: Pulmonary effort is normal.      Breath sounds: Normal breath sounds.   Abdominal:      General: Bowel sounds are normal.      Palpations: Abdomen is soft.   Musculoskeletal:         General: Normal range of motion.      Cervical back: Normal range of motion and neck supple.   Skin:     General: Skin is warm and dry.   Neurological:      General: No focal deficit present.      Mental Status: She is alert and oriented to person, place, and time.   Psychiatric:         Mood and Affect: Mood  normal.         Behavior: Behavior normal.         Thought Content: Thought content normal.         Judgment: Judgment normal.         Home Medications:     Current Outpatient Medications   Medication Sig    ACETAMINOPHEN-CAFFEINE ORAL Take 1 tablet by mouth as needed.    ALPRAZolam (XANAX) 0.5 MG tablet Take 1 tablet (0.5 mg total) by mouth as needed for Anxiety.    azelastine (ASTELIN) 137 mcg (0.1 %) nasal spray 2 sprays by Nasal route 2 (two) times daily.    baclofen (LIORESAL) 10 MG tablet Take 1 tablet (10 mg total) by mouth every evening.    desvenlafaxine succinate (PRISTIQ) 50 MG Tb24 Take 1 tablet (50 mg total) by mouth once daily.    fluticasone propionate (FLONASE) 50 mcg/actuation nasal spray 2 sprays by Each Nostril route 2 (two) times daily.    INV TESTOSTERONE/ANASTROZOL OR PLACEBO PELLETS Inject 1 Pellet into the skin every 3 (three) months. FOR INVESTIGATIONAL USE ONLY    montelukast (SINGULAIR) 10 mg tablet Take 10 mg by mouth once daily.    naltrexone (DEPADE) 50 mg tablet Take 100 mg by mouth every morning. Taking 100mg one day then 50 mg the next   She is alternating between 100 mg and 50 mg very other day    niacin 500 MG CpSR Take 250 mg by mouth every evening.    promethazine (PHENERGAN) 25 MG tablet Take 1 tablet (25 mg total) by mouth daily as needed for Nausea.    tramadol-acetaminophen 37.5-325 mg (ULTRACET) 37.5-325 mg Tab Take 1 tablet by mouth every 6 (six) hours as needed for Pain.    traZODone (DESYREL) 50 MG tablet Take 1 tablet (50 mg total) by mouth nightly.    turmeric root extract 500 mg Tab Take by mouth.    ZOLMitriptan (ZOMIG) 5 MG tablet Take 1 tablet (5 mg total) by mouth as needed for Migraine (1 pill when the migraine starts, may repeat in 2 h if needed. max 2 in 24 hours.).     Laboratory Results:     Recent Results (from the past 4032 hour(s))   Urinalysis, Reflex to Urine Culture    Collection Time: 03/26/23 12:45 PM    Specimen: Urine   Result Value Ref Range     Color, UA Red (A) Yellow, Light-Yellow, Dark Yellow, [place], Straw    Appearance, UA Turbid (A) Clear    Specific Gravity, UA 1.015     pH, UA 7.0 5.0 - 8.5    Protein, UA >=300 (A) Negative mg/dL    Glucose,  (A) Negative, Normal mg/dL    Ketones, UA 40 (A) Negative mg/dL    Blood, UA Large (A) Negative unit/L    Bilirubin, UA Negative Negative mg/dL    Urobilinogen, UA >=8.0 (A) 0.2, 1.0, Normal mg/dL    Nitrites, UA Positive (A) Negative    Leukocyte Esterase, UA Large (A) Negative unit/L   Pregnancy, urine rapid    Collection Time: 03/26/23 12:45 PM   Result Value Ref Range    Beta hCG Qualitative, Urine Negative Negative   Urinalysis, Microscopic    Collection Time: 03/26/23 12:45 PM   Result Value Ref Range    Bacteria, UA Rare None Seen, Rare, Occasional /HPF    RBC, UA >100 (A) None Seen, 0-2, 3-5, 0-5 /HPF    WBC, UA 0-2 None Seen, 0-2, 3-5, 0-5 /HPF    Squamous Epithelial Cells, UA Rare None Seen, Rare, Occasional, Occ /HPF   Comprehensive Metabolic Panel    Collection Time: 03/26/23  1:22 PM   Result Value Ref Range    Sodium Level 139 136 - 145 mmol/L    Potassium Level 4.3 3.5 - 5.1 mmol/L    Chloride 108 (H) 98 - 107 mmol/L    Carbon Dioxide 23 22 - 29 mmol/L    Glucose Level 103 (H) 74 - 100 mg/dL    Blood Urea Nitrogen 12.9 7.0 - 18.7 mg/dL    Creatinine 0.76 0.55 - 1.02 mg/dL    Calcium Level Total 9.7 8.4 - 10.2 mg/dL    Protein Total 7.5 6.4 - 8.3 gm/dL    Albumin Level 4.0 3.5 - 5.0 g/dL    Globulin 3.5 2.4 - 3.5 gm/dL    Albumin/Globulin Ratio 1.1 1.1 - 2.0 ratio    Bilirubin Total 0.4 <=1.5 mg/dL    Alkaline Phosphatase 57 40 - 150 unit/L    Alanine Aminotransferase 13 0 - 55 unit/L    Aspartate Aminotransferase 21 5 - 34 unit/L    eGFR >60 mls/min/1.73/m2   CBC with Differential    Collection Time: 03/26/23  1:22 PM   Result Value Ref Range    WBC 7.2 4.5 - 11.5 x10(3)/mcL    RBC 4.00 (L) 4.20 - 5.40 x10(6)/mcL    Hgb 12.4 12.0 - 16.0 g/dL    Hct 37.0 37.0 - 47.0 %    MCV 92.5 80.0 - 94.0  fL    MCH 31.0 27.0 - 31.0 pg    MCHC 33.5 33.0 - 36.0 g/dL    RDW 12.0 11.5 - 17.0 %    Platelet 358 130 - 400 x10(3)/mcL    MPV 9.0 7.4 - 10.4 fL    Neut % 59.5 %    Lymph % 28.9 %    Mono % 9.7 %    Eos % 1.4 %    Basophil % 0.4 %    Lymph # 2.08 0.6 - 4.6 x10(3)/mcL    Neut # 4.28 2.1 - 9.2 x10(3)/mcL    Mono # 0.70 0.1 - 1.3 x10(3)/mcL    Eos # 0.10 0 - 0.9 x10(3)/mcL    Baso # 0.03 0 - 0.2 x10(3)/mcL    IG# 0.01 0 - 0.04 x10(3)/mcL    IG% 0.1 %    NRBC% 0.0 %     Assessment/Plan:     Problem List Items Addressed This Visit          GI    Irritable bowel syndrome with constipation - Primary     Lifestyle and dietary modifications provided  Continue daily probiotic as directed  Trulance 3 mg daily  Will follow results of allergy testing/workup  Call with updates   Follow-up clinic visit with NP in 1 year

## 2023-08-25 NOTE — ASSESSMENT & PLAN NOTE
Lifestyle and dietary modifications provided  Continue daily probiotic as directed  Trulance 3 mg daily  Will follow results of allergy testing/workup  Call with updates   Follow-up clinic visit with NP in 1 year

## 2023-09-07 NOTE — TELEPHONE ENCOUNTER
I went ahead and faxed a medical request form to [facility] to get patients PT notes prior to her appointment.

## 2023-09-11 ENCOUNTER — HOSPITAL ENCOUNTER (OUTPATIENT)
Dept: RADIOLOGY | Facility: HOSPITAL | Age: 38
Discharge: HOME | End: 2023-09-11
Attending: NURSE PRACTITIONER
Payer: COMMERCIAL

## 2023-09-11 ENCOUNTER — OFFICE VISIT (OUTPATIENT)
Dept: NEUROSURGERY | Facility: CLINIC | Age: 38
End: 2023-09-11
Payer: COMMERCIAL

## 2023-09-11 VITALS
HEIGHT: 62 IN | DIASTOLIC BLOOD PRESSURE: 87 MMHG | SYSTOLIC BLOOD PRESSURE: 122 MMHG | HEART RATE: 65 BPM | WEIGHT: 141.63 LBS | RESPIRATION RATE: 16 BRPM | BODY MASS INDEX: 26.06 KG/M2

## 2023-09-11 DIAGNOSIS — M54.16 LUMBAR RADICULOPATHY: ICD-10-CM

## 2023-09-11 DIAGNOSIS — M51.36 ANNULAR TEAR OF LUMBAR DISC: ICD-10-CM

## 2023-09-11 PROCEDURE — 3079F DIAST BP 80-89 MM HG: CPT | Mod: CPTII,,, | Performed by: NURSE PRACTITIONER

## 2023-09-11 PROCEDURE — 99205 OFFICE O/P NEW HI 60 MIN: CPT | Mod: ,,, | Performed by: NURSE PRACTITIONER

## 2023-09-11 PROCEDURE — 1160F RVW MEDS BY RX/DR IN RCRD: CPT | Mod: CPTII,,, | Performed by: NURSE PRACTITIONER

## 2023-09-11 PROCEDURE — 3074F PR MOST RECENT SYSTOLIC BLOOD PRESSURE < 130 MM HG: ICD-10-PCS | Mod: CPTII,,, | Performed by: NURSE PRACTITIONER

## 2023-09-11 PROCEDURE — 3074F SYST BP LT 130 MM HG: CPT | Mod: CPTII,,, | Performed by: NURSE PRACTITIONER

## 2023-09-11 PROCEDURE — 99205 PR OFFICE/OUTPT VISIT, NEW, LEVL V, 60-74 MIN: ICD-10-PCS | Mod: ,,, | Performed by: NURSE PRACTITIONER

## 2023-09-11 PROCEDURE — 3008F PR BODY MASS INDEX (BMI) DOCUMENTED: ICD-10-PCS | Mod: CPTII,,, | Performed by: NURSE PRACTITIONER

## 2023-09-11 PROCEDURE — 1159F PR MEDICATION LIST DOCUMENTED IN MEDICAL RECORD: ICD-10-PCS | Mod: CPTII,,, | Performed by: NURSE PRACTITIONER

## 2023-09-11 PROCEDURE — 1160F PR REVIEW ALL MEDS BY PRESCRIBER/CLIN PHARMACIST DOCUMENTED: ICD-10-PCS | Mod: CPTII,,, | Performed by: NURSE PRACTITIONER

## 2023-09-11 PROCEDURE — 3008F BODY MASS INDEX DOCD: CPT | Mod: CPTII,,, | Performed by: NURSE PRACTITIONER

## 2023-09-11 PROCEDURE — 3079F PR MOST RECENT DIASTOLIC BLOOD PRESSURE 80-89 MM HG: ICD-10-PCS | Mod: CPTII,,, | Performed by: NURSE PRACTITIONER

## 2023-09-11 PROCEDURE — 1159F MED LIST DOCD IN RCRD: CPT | Mod: CPTII,,, | Performed by: NURSE PRACTITIONER

## 2023-09-11 PROCEDURE — 72114 X-RAY EXAM L-S SPINE BENDING: CPT | Mod: TC

## 2023-09-11 RX ORDER — FLUCONAZOLE 150 MG/1
150 TABLET ORAL
COMMUNITY
Start: 2023-07-02 | End: 2023-09-11

## 2023-09-11 RX ORDER — CYCLOBENZAPRINE HCL 10 MG
TABLET ORAL
COMMUNITY
End: 2023-09-11

## 2023-09-11 RX ORDER — TRIAMCINOLONE ACETONIDE 55 UG/1
SPRAY, METERED NASAL
COMMUNITY
End: 2023-09-11

## 2023-09-11 RX ORDER — IPRATROPIUM BROMIDE 21 UG/1
SPRAY, METERED NASAL
COMMUNITY
Start: 2023-09-05

## 2023-09-11 RX ORDER — ONDANSETRON 4 MG/1
TABLET, ORALLY DISINTEGRATING ORAL
COMMUNITY

## 2023-09-11 RX ORDER — POLYETHYLENE GLYCOL 3350 17 G/17G
POWDER, FOR SOLUTION ORAL
COMMUNITY
End: 2023-09-11

## 2023-09-11 RX ORDER — IBUPROFEN 800 MG/1
TABLET ORAL
COMMUNITY
End: 2023-09-11

## 2023-09-11 RX ORDER — AMOXICILLIN 250 MG
CAPSULE ORAL
COMMUNITY
End: 2023-09-11

## 2023-09-11 RX ORDER — DIPHENHYDRAMINE HCL 12.5MG/5ML
ELIXIR ORAL
COMMUNITY
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
## 2023-10-02 ENCOUNTER — TELEPHONE (OUTPATIENT)
Dept: NEUROSURGERY | Facility: CLINIC | Age: 38
End: 2023-10-02
Payer: COMMERCIAL

## 2023-10-02 NOTE — TELEPHONE ENCOUNTER
"I spoke with patient in regards to her PT appointment at [facility]. She stated she has already been called and has been going to the appointments. She did inform me that they are not doing the "extra" stuff that Aimee told her they would do. They are only doing exercises. I did advise her that I will obtain PT notes prior to appointment. She verbalized understanding.  "
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
## 2023-10-24 ENCOUNTER — OFFICE VISIT (OUTPATIENT)
Dept: PAIN MEDICINE | Facility: CLINIC | Age: 38
End: 2023-10-24
Payer: COMMERCIAL

## 2023-10-24 ENCOUNTER — OFFICE VISIT (OUTPATIENT)
Dept: NEUROSURGERY | Facility: CLINIC | Age: 38
End: 2023-10-24
Payer: COMMERCIAL

## 2023-10-24 VITALS — BODY MASS INDEX: 26.13 KG/M2 | WEIGHT: 142 LBS | HEIGHT: 62 IN

## 2023-10-24 VITALS
HEART RATE: 76 BPM | HEIGHT: 62 IN | SYSTOLIC BLOOD PRESSURE: 133 MMHG | DIASTOLIC BLOOD PRESSURE: 88 MMHG | RESPIRATION RATE: 16 BRPM | WEIGHT: 141 LBS | BODY MASS INDEX: 25.95 KG/M2

## 2023-10-24 DIAGNOSIS — G89.29 CHRONIC NECK PAIN: ICD-10-CM

## 2023-10-24 DIAGNOSIS — M51.36 ANNULAR TEAR OF LUMBAR DISC: ICD-10-CM

## 2023-10-24 DIAGNOSIS — M54.16 LUMBAR RADICULOPATHY: Primary | ICD-10-CM

## 2023-10-24 DIAGNOSIS — M54.41 CHRONIC BILATERAL LOW BACK PAIN WITH BILATERAL SCIATICA: ICD-10-CM

## 2023-10-24 DIAGNOSIS — M51.36 ANNULAR TEAR OF LUMBAR DISC: Primary | ICD-10-CM

## 2023-10-24 DIAGNOSIS — M51.36 BULGE OF LUMBAR DISC WITHOUT MYELOPATHY: ICD-10-CM

## 2023-10-24 DIAGNOSIS — G89.29 CHRONIC BILATERAL LOW BACK PAIN WITH BILATERAL SCIATICA: ICD-10-CM

## 2023-10-24 DIAGNOSIS — M54.2 CHRONIC NECK PAIN: ICD-10-CM

## 2023-10-24 DIAGNOSIS — M54.42 CHRONIC BILATERAL LOW BACK PAIN WITH BILATERAL SCIATICA: ICD-10-CM

## 2023-10-24 PROCEDURE — 99214 OFFICE O/P EST MOD 30 MIN: CPT | Mod: ,,, | Performed by: NEUROLOGICAL SURGERY

## 2023-10-24 PROCEDURE — 3008F BODY MASS INDEX DOCD: CPT | Mod: CPTII,,, | Performed by: NURSE PRACTITIONER

## 2023-10-24 PROCEDURE — 3075F PR MOST RECENT SYSTOLIC BLOOD PRESS GE 130-139MM HG: ICD-10-PCS | Mod: CPTII,,, | Performed by: NEUROLOGICAL SURGERY

## 2023-10-24 PROCEDURE — 3079F DIAST BP 80-89 MM HG: CPT | Mod: CPTII,,, | Performed by: NEUROLOGICAL SURGERY

## 2023-10-24 PROCEDURE — 1160F RVW MEDS BY RX/DR IN RCRD: CPT | Mod: CPTII,,, | Performed by: NURSE PRACTITIONER

## 2023-10-24 PROCEDURE — 3008F BODY MASS INDEX DOCD: CPT | Mod: CPTII,,, | Performed by: NEUROLOGICAL SURGERY

## 2023-10-24 PROCEDURE — 1160F PR REVIEW ALL MEDS BY PRESCRIBER/CLIN PHARMACIST DOCUMENTED: ICD-10-PCS | Mod: CPTII,,, | Performed by: NEUROLOGICAL SURGERY

## 2023-10-24 PROCEDURE — 3079F PR MOST RECENT DIASTOLIC BLOOD PRESSURE 80-89 MM HG: ICD-10-PCS | Mod: CPTII,,, | Performed by: NEUROLOGICAL SURGERY

## 2023-10-24 PROCEDURE — 99214 OFFICE O/P EST MOD 30 MIN: CPT | Mod: ,,, | Performed by: NURSE PRACTITIONER

## 2023-10-24 PROCEDURE — 3008F PR BODY MASS INDEX (BMI) DOCUMENTED: ICD-10-PCS | Mod: CPTII,,, | Performed by: NEUROLOGICAL SURGERY

## 2023-10-24 PROCEDURE — 1160F RVW MEDS BY RX/DR IN RCRD: CPT | Mod: CPTII,,, | Performed by: NEUROLOGICAL SURGERY

## 2023-10-24 PROCEDURE — 1159F PR MEDICATION LIST DOCUMENTED IN MEDICAL RECORD: ICD-10-PCS | Mod: CPTII,,, | Performed by: NURSE PRACTITIONER

## 2023-10-24 PROCEDURE — 3008F PR BODY MASS INDEX (BMI) DOCUMENTED: ICD-10-PCS | Mod: CPTII,,, | Performed by: NURSE PRACTITIONER

## 2023-10-24 PROCEDURE — 3075F SYST BP GE 130 - 139MM HG: CPT | Mod: CPTII,,, | Performed by: NEUROLOGICAL SURGERY

## 2023-10-24 PROCEDURE — 99214 PR OFFICE/OUTPT VISIT, EST, LEVL IV, 30-39 MIN: ICD-10-PCS | Mod: ,,, | Performed by: NURSE PRACTITIONER

## 2023-10-24 PROCEDURE — 1159F MED LIST DOCD IN RCRD: CPT | Mod: CPTII,,, | Performed by: NURSE PRACTITIONER

## 2023-10-24 PROCEDURE — 1159F MED LIST DOCD IN RCRD: CPT | Mod: CPTII,,, | Performed by: NEUROLOGICAL SURGERY

## 2023-10-24 PROCEDURE — 99214 PR OFFICE/OUTPT VISIT, EST, LEVL IV, 30-39 MIN: ICD-10-PCS | Mod: ,,, | Performed by: NEUROLOGICAL SURGERY

## 2023-10-24 PROCEDURE — 1160F PR REVIEW ALL MEDS BY PRESCRIBER/CLIN PHARMACIST DOCUMENTED: ICD-10-PCS | Mod: CPTII,,, | Performed by: NURSE PRACTITIONER

## 2023-10-24 PROCEDURE — 1159F PR MEDICATION LIST DOCUMENTED IN MEDICAL RECORD: ICD-10-PCS | Mod: CPTII,,, | Performed by: NEUROLOGICAL SURGERY

## 2023-10-24 NOTE — PROGRESS NOTES
Subjective:      Patient ID: Padmini La is a 38 y.o. female.    Chief Complaint: Follow-up (8wk f/u Sciatica pain after PT. States that the pain comes and goes depending on what activities she does. States that PT helped a little with strengthening. )    Referred by: No ref. provider found     HPI:  This is a pleasant 38-year-old  female patient presenting as an 6week follow-up for pain associated with sciatica after completing physical therapy.PT order was originally placed to evaluate and treat L5 + S1 annular fissure and lumbar disc bulge and DDD for 2-3 times a week for 6-8 weeks to include ionophoresis  along with manual PT + exercises.  To cause more than $100 dollars out of  pocket as insurance does not cover this.  Patient reports physical therapy helped a little with strengthening however her pain is still present just not as intense.  She also reports more strength to her legs.  Her pain score has remained the same since last office visit of a 2/10.     Pain started In May when she pivoted wrong while playing ball with her kids and had severe pain with bilateral hips.  Subsequently she was unable to walk temporarily during that event.  She was able to ambulate shortly after.  She has also seen a massage therapist for this pain that was not successful.     Pain is not as intense as it was prior as pain was described as a burning knife type sensation that is also achy in will spasm at times.  Her pain occurs daily and is increased with prolonged standing chores and playing with the kids.  She notices her pain is reduced when she goes to the gym and does resistance training and lunges as this will decrease her pain.  Additionally she reports it feels like this resets everything.  She has intermittent groin pain left greater than right.  She also has bilateral hip pain left greater than right and bilateral buttock pain with intermittent radiation down bilateral legs on the lateral side that stops at  "the posterior knee.(L5) Pain is no longer radiating  to top of both thighs (L4) or intermittent radiation to bilateral inner thighs. Sometimes the locations of her pain changes down the leg.  Pain score today is 2/10.  Pain will reduce when she works with a massage therapist in his lying down.     She has never completed epidural steroid injections.  No history of an implanted pacemaker, defibrillator or spinal cord stimulator.    Vital signs:   Vitals:    10/24/23 1030   Weight: 64.4 kg (142 lb)   Height: 5' 2" (1.575 m)   PainSc:   2     Body mass index is 25.97 kg/m².  Pain Disability Index (PDI): 18       Interventional Pain History  None    ROS:  Low back and leg pain    FINDINGS:  There are 5 non-rib-bearing lumbar type vertebral bodies.  Lumbar spine alignment is anatomic without spondylolisthesis or pars interarticularis fracture.  The T12 and lumbar vertebral bodies are normal in morphology and there is no acute or chronic fracture.  There is a hemangioma at the superior L1 vertebral body with no additional osseous lesion or marrow signal abnormality.  The visualized distal thoracic spinal cord and conus are normal with the conus terminating at the upper L1 vertebral body level.  No paravertebral soft tissue abnormality is identified.  Additional changes of the spine on a level by level basis are as follows:     T12-L1: Normal on sagittal imaging.     L1-L2 through L3-L4: No significant abnormality or stenosis.     L4-L5: Minimal bilateral degenerative facet change with no additional abnormality and no stenosis.     L5-S1: Intervertebral disc desiccation and mild-to-moderate disc space narrowing.  Shallow broad-based central disc protrusion measuring 4 x 13 mm in AP and TV extent and effacing the ventral epidural fat with no significant impingement upon the ventral thecal sac.  A posterior peripheral annular fissure is suggested.  There is also minimal bilateral degenerative facet change.  No spinal canal " or lateral recess stenosis and no neural foramen stenosis.  The visualized sacrum and sacroiliac joints are normal.     Impression:     Degenerative disc disease at L5-S1 with shallow broad-based central disc protrusion and posterior annular fissure suggested.     Minimal bilateral degenerative facet change at L4-S1.     No spinal canal, lateral recess, or neural foramen stenosis.           Objective:      Physical Exam  General: Well developed; normal weight.  A&O x 3; No anxiety/depression; NAD  Mental Status: Oriented to person, palce and time. Displays appropriate mood & affect.  Head: Norm cephalic and atraumatic  Neck:  No cervical paraspinal banding.  Full range of motion with lateral turning and cervical flexion +extension.  Eyes: normal conjunctiva, normal lids, normal pupils  ENT and mouth: normal external ear, nose, and no lesions noted on the lips.  Respiratory: Symmetrical, Unlabored. No dyspnea  CV: normal rhythm and rate. No peripheral edema.   Abdomen: Non-distended     Extremities:  Gen: No cyanosis or tenderness to palpation bilateral upper and lower extremities  Skin: Warm, pink, dry, no rashes, no lesions on the lumbar spine  Strength: 5/5 motor strength bilateral upper and lower extremities  ROM: Full ROM in bilateral knees and ankles without pain or instability.     Neuro:  Gait: no altalgic lean, normal toe and heel raise. Independent ambulator.  DTR's: 2+ in bilateral patellar, and ankle  Sensory: Intact to light touch bilateral  upper and lower extremities     Spine: Normal lordosis. No scoliosis  L-spine ROM: Full ROM to flexion, extension, bilateral rotation,   Straight Leg Raise:  Positive bilaterally.  SI Joint: No tenderness to palpation bilaterally.         Assessment:     A/P: Patient completed PT for bilateral low  back pain which did help reduce pain intensity slightly with dry needling.  However her primary pain remains located to bilateral low back radiating more intense down the  lateral leg to her feet as well as radiating down the posterior leg to her feet.  This occurs with prolonged walking, household chores, and day-to-day activities.  She would like to start with L5 epidural steroid on both sides 1st as this pain is more intense than the pain radiating down the posterior legs to the foot..      She would like to proceed with a bilateral TFESI to L5  She will stop fish oil 7 days prior to procedure  Requesting no propofol     Follow up pre op if indicated.         Encounter Diagnoses   Name Primary?    Lumbar radiculopathy Yes    Annular tear of lumbar disc     Bulge of lumbar disc without myelopathy          Plan:       Padmini was seen today for follow-up.    Diagnoses and all orders for this visit:    Lumbar radiculopathy    Annular tear of lumbar disc    Bulge of lumbar disc without myelopathy

## 2023-10-24 NOTE — PATIENT INSTRUCTIONS
Ms. La is a 38 y.o. female who has a past medical history significant for migraine headaches, irritable bowel syndrome, rheumatoid arthritis, fibromyalgia, and depression.  She presents as a follow up patient in the neurosurgery clinic for chronic low back pain intermittent radiation into her left-greater-than-right right lateral legs for the last 10 years.  The patient has a normal motor and sensory neurological exam with no signs of myelopathy.    I reviewed pertinent imaging studies with the patient and her mother. A MRI lumbar spine without gadolinium demonstrates normal alignment.  There is degenerative disc disease at L5-S1 with a broad disc bulge including a central annular tear.  There is no significant neural compression.         PLAN:    Encounter Diagnoses   Name Primary?    Annular tear of lumbar disc Yes    Chronic bilateral low back pain with bilateral sciatica     Chronic neck pain      Orders Placed This Encounter   Procedures    X-Ray Cervical Spine 5 View W Flex Extxt    Ambulatory referral/consult to Physical/Occupational Therapy        1.  I discussed with Ms. La and her mother that continued optimization of medical management is recommended for her low back pain with intermittent radiation into her bilateral lower extremities.  I am not able to identify a surgical target that will significantly improve her chronic symptoms.  The patient is very agreeable to nonsurgical management.    2.  To further evaluate her chronic neck pain, I am ordering cervical spine x-rays with AP lateral and flexion-extension views.  I suspect that she has chronic musculoskeletal spasms.  Ms. La will be contacted with her radiographic results and any updates in regards to her plan of care.    3.  She is recommended to continue with physical therapy at [facility] in [facility].  I am submitting a referral for her to start aquatherapy at this PT facility.    4.  The patient is recommended to continuing to follow up with  pain management specialist Dr. Mederos.  She is scheduled for a lumbar epidural steroid injection on 11/20/2023.    5.  Ms. La is to follow up as scheduled with her rheumatologist Dr. Tineo on 11/10/2023.      6.  The patient is encouraged to follow up in the neurosurgery clinic on an as-needed basis for any concerning changes in condition or symptoms.          This note will be sent to the patient's referring provider No ref. provider found and primary care provider Yancy Katz MD.

## 2023-10-26 ENCOUNTER — HOSPITAL ENCOUNTER (OUTPATIENT)
Dept: RADIOLOGY | Facility: HOSPITAL | Age: 38
Discharge: HOME | End: 2023-10-26
Attending: NEUROLOGICAL SURGERY
Payer: COMMERCIAL

## 2023-10-26 DIAGNOSIS — G89.29 CHRONIC NECK PAIN: ICD-10-CM

## 2023-10-26 DIAGNOSIS — M54.2 CHRONIC NECK PAIN: ICD-10-CM

## 2023-10-26 PROCEDURE — 72052 X-RAY EXAM NECK SPINE 6/>VWS: CPT | Mod: TC

## 2023-10-27 ENCOUNTER — TELEPHONE (OUTPATIENT)
Dept: NEUROSURGERY | Facility: CLINIC | Age: 38
End: 2023-10-27
Payer: COMMERCIAL

## 2023-10-27 NOTE — TELEPHONE ENCOUNTER
I am requesting BARBARA Yu to contact Ms. La.  I reviewed the patient's cervical spine x-ray images that were completed on 10/26/2023.    These cervical spine x-rays demonstrate normal alignment with no motion on flexion-extension views.        With these updated radiographic results, my recommendations are as follows:    1.  Continued optimization of medical management is recommended for the patient's chronic neck and low back pain.    2.  I would like to ensure that the patient has been contacted to initiate aquatherapy at the [facility] facility at [facility].    3.  Ms. La is scheduled for a lumbar epidural steroid injection on 11/20/2023 under the care of Dr. Mederos.      4.  She is to follow up as scheduled with her rheumatologist Dr. Tineo on 11/10/2023.       5.  The patient is encouraged to follow up in the neurosurgery clinic on an as-needed basis for any concerning changes in condition or symptoms.

## 2023-10-30 NOTE — TELEPHONE ENCOUNTER
Spoke with patient regarding her recent x-rays of the cervical spine as well as Dr. Mccullough's recommendations.  She began physical therapy today although is hesitant about aquatherapy at this time.  She is scheduled for an injection with Dr. Mederos on 11/20/2023.  She states she routinely follows up with Dr. Simpson every 3 months and plans to do so again in the near future.  We will plan to follow up with the patient on as needed basis going forward.  She was advised to notify us with any further progression of symptoms in the future.  She verbalizes understanding.

## 2023-11-10 ENCOUNTER — OFFICE VISIT (OUTPATIENT)
Dept: RHEUMATOLOGY | Facility: CLINIC | Age: 38
End: 2023-11-10
Payer: COMMERCIAL

## 2023-11-10 VITALS
TEMPERATURE: 98 F | OXYGEN SATURATION: 98 % | HEIGHT: 62 IN | BODY MASS INDEX: 25.88 KG/M2 | SYSTOLIC BLOOD PRESSURE: 125 MMHG | WEIGHT: 140.63 LBS | DIASTOLIC BLOOD PRESSURE: 85 MMHG | HEART RATE: 78 BPM

## 2023-11-10 DIAGNOSIS — R10.2 PAIN IN PELVIS: ICD-10-CM

## 2023-11-10 DIAGNOSIS — G43.001 MIGRAINE WITHOUT AURA AND WITH STATUS MIGRAINOSUS, NOT INTRACTABLE: ICD-10-CM

## 2023-11-10 DIAGNOSIS — G47.00 INSOMNIA, UNSPECIFIED TYPE: ICD-10-CM

## 2023-11-10 DIAGNOSIS — K58.1 IRRITABLE BOWEL SYNDROME WITH CONSTIPATION: ICD-10-CM

## 2023-11-10 DIAGNOSIS — M79.7 FIBROMYALGIA: ICD-10-CM

## 2023-11-10 DIAGNOSIS — M05.9 SEROPOSITIVE RHEUMATOID ARTHRITIS: Primary | ICD-10-CM

## 2023-11-10 DIAGNOSIS — F51.01 PRIMARY INSOMNIA: ICD-10-CM

## 2023-11-10 DIAGNOSIS — K59.00 CONSTIPATION, UNSPECIFIED CONSTIPATION TYPE: ICD-10-CM

## 2023-11-10 DIAGNOSIS — F32.A DEPRESSIVE DISORDER: ICD-10-CM

## 2023-11-10 PROCEDURE — 3008F BODY MASS INDEX DOCD: CPT | Mod: CPTII,S$GLB,, | Performed by: INTERNAL MEDICINE

## 2023-11-10 PROCEDURE — 1160F RVW MEDS BY RX/DR IN RCRD: CPT | Mod: CPTII,S$GLB,, | Performed by: INTERNAL MEDICINE

## 2023-11-10 PROCEDURE — 1159F PR MEDICATION LIST DOCUMENTED IN MEDICAL RECORD: ICD-10-PCS | Mod: CPTII,S$GLB,, | Performed by: INTERNAL MEDICINE

## 2023-11-10 PROCEDURE — 3074F SYST BP LT 130 MM HG: CPT | Mod: CPTII,S$GLB,, | Performed by: INTERNAL MEDICINE

## 2023-11-10 PROCEDURE — 3079F PR MOST RECENT DIASTOLIC BLOOD PRESSURE 80-89 MM HG: ICD-10-PCS | Mod: CPTII,S$GLB,, | Performed by: INTERNAL MEDICINE

## 2023-11-10 PROCEDURE — 99214 PR OFFICE/OUTPT VISIT, EST, LEVL IV, 30-39 MIN: ICD-10-PCS | Mod: S$GLB,,, | Performed by: INTERNAL MEDICINE

## 2023-11-10 PROCEDURE — 1160F PR REVIEW ALL MEDS BY PRESCRIBER/CLIN PHARMACIST DOCUMENTED: ICD-10-PCS | Mod: CPTII,S$GLB,, | Performed by: INTERNAL MEDICINE

## 2023-11-10 PROCEDURE — 3079F DIAST BP 80-89 MM HG: CPT | Mod: CPTII,S$GLB,, | Performed by: INTERNAL MEDICINE

## 2023-11-10 PROCEDURE — 99999 PR PBB SHADOW E&M-EST. PATIENT-LVL IV: ICD-10-PCS | Mod: PBBFAC,,, | Performed by: INTERNAL MEDICINE

## 2023-11-10 PROCEDURE — 1159F MED LIST DOCD IN RCRD: CPT | Mod: CPTII,S$GLB,, | Performed by: INTERNAL MEDICINE

## 2023-11-10 PROCEDURE — 3074F PR MOST RECENT SYSTOLIC BLOOD PRESSURE < 130 MM HG: ICD-10-PCS | Mod: CPTII,S$GLB,, | Performed by: INTERNAL MEDICINE

## 2023-11-10 PROCEDURE — 99214 OFFICE O/P EST MOD 30 MIN: CPT | Mod: S$GLB,,, | Performed by: INTERNAL MEDICINE

## 2023-11-10 PROCEDURE — 3008F PR BODY MASS INDEX (BMI) DOCUMENTED: ICD-10-PCS | Mod: CPTII,S$GLB,, | Performed by: INTERNAL MEDICINE

## 2023-11-10 PROCEDURE — 99999 PR PBB SHADOW E&M-EST. PATIENT-LVL IV: CPT | Mod: PBBFAC,,, | Performed by: INTERNAL MEDICINE

## 2023-11-10 RX ORDER — TRAMADOL HYDROCHLORIDE AND ACETAMINOPHEN 37.5; 325 MG/1; MG/1
1 TABLET, FILM COATED ORAL EVERY 6 HOURS PRN
Qty: 100 TABLET | Refills: 5 | Status: SHIPPED | OUTPATIENT
Start: 2023-11-10

## 2023-11-10 RX ORDER — BACLOFEN 10 MG/1
10 TABLET ORAL NIGHTLY
Qty: 90 TABLET | Refills: 3 | Status: SHIPPED | OUTPATIENT
Start: 2023-11-10

## 2023-11-10 RX ORDER — PREGABALIN 25 MG/1
25 CAPSULE ORAL 2 TIMES DAILY
Qty: 180 CAPSULE | Refills: 3 | Status: SHIPPED | OUTPATIENT
Start: 2023-11-10 | End: 2024-11-04

## 2023-11-10 RX ORDER — DESVENLAFAXINE SUCCINATE 50 MG/1
50 TABLET, EXTENDED RELEASE ORAL DAILY
Qty: 90 TABLET | Refills: 3 | Status: SHIPPED | OUTPATIENT
Start: 2023-11-10 | End: 2024-11-09

## 2023-11-10 NOTE — PROGRESS NOTES
"Subjective:       Patient ID: Padmini La is a 38 y.o. female.    Chief Complaint: Follow-up (Follow up. Patient complains of headache and neck pain. Pain 4/10.)    The patient is complaining of joint pain involving the MCP PIP wrist elbow shoulders hips knees and ankles bilaterally.  The pain is 1/10 in intensity dull in quality and continuous.  That is associated with a morning stiffness lasting for LESS than 60 minutes.  Is also having difficulty maintaining a good night of sleep.  This has been associated with myalgias.  Muscle aches are 9/10 in intensity dull in quality and continuous.  They are associated with fatigue.  No fever no chills no others.  Labs checked during this visit         Review of Systems   Constitutional:  Negative for appetite change, chills and fever.   HENT:  Negative for congestion, ear pain, mouth sores, nosebleeds and trouble swallowing.    Eyes:  Negative for photophobia and discharge.   Respiratory:  Negative for chest tightness and shortness of breath.    Cardiovascular:  Negative for chest pain.   Gastrointestinal:  Negative for abdominal pain and vomiting.   Endocrine: Negative.    Genitourinary:  Negative for hematuria.   Musculoskeletal:         As per HPI   Skin:  Negative for rash.   Neurological:  Negative for weakness.         Objective:   /85 (BP Location: Left arm, Patient Position: Sitting, BP Method: Medium (Automatic))   Pulse 78   Temp 98.4 °F (36.9 °C) (Oral)   Ht 5' 2" (1.575 m)   Wt 63.8 kg (140 lb 9.6 oz)   LMP  (LMP Unknown)   SpO2 98%   BMI 25.72 kg/m²      Physical Exam   Constitutional: She is oriented to person, place, and time. She appears well-developed and well-nourished. No distress.   HENT:   Head: Normocephalic and atraumatic.   Right Ear: External ear normal.   Left Ear: External ear normal.   Eyes: Pupils are equal, round, and reactive to light.   Cardiovascular: Normal rate, regular rhythm and normal heart sounds.   Pulmonary/Chest: " Breath sounds normal.   Abdominal: Soft. There is no abdominal tenderness.   Musculoskeletal:      Right shoulder: Normal.      Left shoulder: Normal.      Right elbow: Normal.      Left elbow: Normal.      Right wrist: Normal.      Left wrist: Normal.      Cervical back: Neck supple.      Right hip: Normal.      Left hip: Normal.      Right knee: Normal.      Left knee: Normal.   Lymphadenopathy:     She has no cervical adenopathy.   Neurological: She is alert and oriented to person, place, and time. She displays normal reflexes. No cranial nerve deficit or sensory deficit. She exhibits normal muscle tone. Coordination normal.   Skin: No rash noted. No erythema.   Vitals reviewed.      Right Side Rheumatological Exam     Examination finds the shoulder, elbow, wrist, knee, 1st PIP, 1st MCP, 2nd PIP, 2nd MCP, 3rd PIP, 3rd MCP, 4th PIP, 4th MCP, 5th PIP, 5th MCP, temporomandibular, hip, ankle, 1st MTP, 2nd MTP, 3rd MTP, 4th MTP and 5th MTP normal.    Left Side Rheumatological Exam     Examination finds the shoulder, elbow, wrist, knee, 1st PIP, 1st MCP, 2nd PIP, 2nd MCP, 3rd PIP, 3rd MCP, 4th PIP, 4th MCP, 5th PIP, 5th MCP, temporomandibular, hip, ankle, 1st MTP, 2nd MTP, 3rd MTP, 4th MTP and 5th MTP normal.         Completed Fibromyalgia exam 18/18 tender points.  No data to display     Assessment:       1. Seropositive rheumatoid arthritis    2. Fibromyalgia    3. Primary insomnia    4. Migraine without aura and with status migrainosus, not intractable    5. Depressive disorder    6. Insomnia, unspecified type    7. Irritable bowel syndrome with constipation    8. Constipation, unspecified constipation type    9. Pain in pelvis          Medication List with Changes/Refills   New Medications    PREGABALIN (LYRICA) 25 MG CAPSULE    Take 1 capsule (25 mg total) by mouth 2 (two) times daily.       Start Date: 11/10/2023End Date: 11/4/2024   Current Medications    ACETAMINOPHEN-CAFFEINE ORAL    Take 1 tablet by mouth as  needed.       Start Date: 2021 End Date: --    ALPRAZOLAM (XANAX) 0.5 MG TABLET    Take 1 tablet (0.5 mg total) by mouth as needed for Anxiety.       Start Date: 3/8/2023  End Date: --    AZELASTINE (ASTELIN) 137 MCG (0.1 %) NASAL SPRAY    2 sprays by Nasal route 2 (two) times daily.       Start Date: 3/20/2022 End Date: --    DIPHENHYDRAMINE (BENADRYL) 12.5 MG/5 ML ELIXIR    Benadryl Take No date recorded No form recorded No frequency recorded No route recorded No set duration recorded No set duration amount recorded active No dosage strength recorded No dosage strength units of measure recorded       Start Date: --        End Date: --    FLUTICASONE PROPIONATE (FLONASE) 50 MCG/ACTUATION NASAL SPRAY    2 sprays by Each Nostril route 2 (two) times daily.       Start Date: 2022 End Date: --    INV TESTOSTERONE/ANASTROZOL OR PLACEBO PELLETS    Inject 1 Pellet into the skin every 3 (three) months. FOR INVESTIGATIONAL USE ONLY       Start Date: --        End Date: --    IPRATROPIUM (ATROVENT) 21 MCG (0.03 %) NASAL SPRAY    SMARTSI Spray(s) Both Nares 3 Times Daily PRN       Start Date: 2023  End Date: --    LORATADINE-PSEUDOEPHEDRINE 5-120 MG (CLARITIN-D 12-HOUR) 5-120 MG PER TABLET    Claritin-D 12 Hour Take No date recorded No form recorded No frequency recorded No route recorded No set duration recorded No set duration amount recorded active No dosage strength recorded No dosage strength units of measure recorded       Start Date: --        End Date: --    MONTELUKAST (SINGULAIR) 10 MG TABLET    Take 10 mg by mouth once daily.       Start Date: 2022 End Date: --    MULTIVITAMIN CAPSULE    Take 1 capsule by mouth every morning.       Start Date: --        End Date: --    NALTREXONE (DEPADE) 50 MG TABLET    Take 100 mg by mouth every morning. Taking 100mg one day then 50 mg the next   She is alternating between 100 mg and 50 mg very other day       Start Date: 7/10/2023 End Date: --    ONDANSETRON  (ZOFRAN-ODT) 4 MG TBDL    Zofran ODT Take No date recorded No form recorded No frequency recorded No route recorded No set duration recorded No set duration amount recorded suspended No dosage strength recorded No dosage strength units of measure recorded       Start Date: --        End Date: --    PLECANATIDE (TRULANCE) 3 MG TAB    Take 3 mg by mouth once daily.       Start Date: 8/25/2023 End Date: --    TRAZODONE (DESYREL) 50 MG TABLET    Take 1 tablet (50 mg total) by mouth nightly.       Start Date: 7/10/2023 End Date: --    TURMERIC ROOT EXTRACT 500 MG TAB    Take by mouth.       Start Date: --        End Date: --    ZOLMITRIPTAN (ZOMIG) 5 MG TABLET    Take 1 tablet (5 mg total) by mouth as needed for Migraine (1 pill when the migraine starts, may repeat in 2 h if needed. max 2 in 24 hours.).       Start Date: 11/8/2022 End Date: --   Changed and/or Refilled Medications    Modified Medication Previous Medication    BACLOFEN (LIORESAL) 10 MG TABLET baclofen (LIORESAL) 10 MG tablet       Take 1 tablet (10 mg total) by mouth every evening.    Take 1 tablet (10 mg total) by mouth every evening.       Start Date: 11/10/2023End Date: --    Start Date: 3/8/2023  End Date: 11/10/2023    DESVENLAFAXINE SUCCINATE (PRISTIQ) 50 MG TB24 desvenlafaxine succinate (PRISTIQ) 50 MG Tb24       Take 1 tablet (50 mg total) by mouth once daily.    Take 1 tablet (50 mg total) by mouth once daily.       Start Date: 11/10/2023End Date: 11/9/2024    Start Date: 7/10/2023 End Date: 11/10/2023    TRAMADOL-ACETAMINOPHEN 37.5-325 MG (ULTRACET) 37.5-325 MG TAB tramadol-acetaminophen 37.5-325 mg (ULTRACET) 37.5-325 mg Tab       Take 1 tablet by mouth every 6 (six) hours as needed for Pain.    Take 1 tablet by mouth every 6 (six) hours as needed for Pain.       Start Date: 11/10/2023End Date: --    Start Date: 3/8/2023  End Date: 11/10/2023         Plan:         Problem List Items Addressed This Visit          Neuro    Migraine without aura  with status migrainosus    Relevant Medications    baclofen (LIORESAL) 10 MG tablet    desvenlafaxine succinate (PRISTIQ) 50 MG Tb24    tramadol-acetaminophen 37.5-325 mg (ULTRACET) 37.5-325 mg Tab       Psychiatric    Depressive disorder    Relevant Medications    baclofen (LIORESAL) 10 MG tablet    desvenlafaxine succinate (PRISTIQ) 50 MG Tb24    tramadol-acetaminophen 37.5-325 mg (ULTRACET) 37.5-325 mg Tab       Immunology/Multi System    Seropositive rheumatoid arthritis - Primary    Relevant Medications    baclofen (LIORESAL) 10 MG tablet    desvenlafaxine succinate (PRISTIQ) 50 MG Tb24    tramadol-acetaminophen 37.5-325 mg (ULTRACET) 37.5-325 mg Tab       GI    Constipation    Relevant Medications    desvenlafaxine succinate (PRISTIQ) 50 MG Tb24    Irritable bowel syndrome with constipation    Relevant Medications    baclofen (LIORESAL) 10 MG tablet    tramadol-acetaminophen 37.5-325 mg (ULTRACET) 37.5-325 mg Tab    Pain in pelvis    Relevant Medications    desvenlafaxine succinate (PRISTIQ) 50 MG Tb24       Orthopedic    Fibromyalgia    Relevant Medications    baclofen (LIORESAL) 10 MG tablet    desvenlafaxine succinate (PRISTIQ) 50 MG Tb24    tramadol-acetaminophen 37.5-325 mg (ULTRACET) 37.5-325 mg Tab       Other    Insomnia    Relevant Medications    baclofen (LIORESAL) 10 MG tablet    desvenlafaxine succinate (PRISTIQ) 50 MG Tb24    tramadol-acetaminophen 37.5-325 mg (ULTRACET) 37.5-325 mg Tab

## 2023-11-17 NOTE — DISCHARGE INSTRUCTIONS
Epidural Steroid Injection After Care    You may take the bandage off and shower tomorrow. Check for signs and symptoms of infection daily: redness, warmth, pus or drainage, increase swelling, and foul odor.  Wash your hands before and after touching your puncture site.  No heavy lifting for 1 week.  No driving for 24 hours.  You may resume your regular diet today.  You may resume your regular activities tomorrow.  No heating pad on the puncture site for at least 24 hours afterwards. You may use an ice pack for pain or swelling for no longer than 15 minutes at a time for 3-4 times a day. Do not place ice directly on your skin.  Relax on the day of the injection.  Do not run on machines or do any strenuous exercises for at least 24 hours afterwards.  It may take a few days before you will feel the effects of the injection.      Contact your doctor for:  Increase pain not controlled with medication.  Any new numbness or tingling.  Fever greater than 101 degree Fahrenheit that does not break with medication.  Any signs or symptoms of infection: redness, warmth, pus or drainage, increase swelling, and foul odor at the puncture site.  Epidural Steroid Injection After Care    You may take the bandage off and shower tomorrow. Check for signs and symptoms of infection daily: redness, warmth, pus or drainage, increase swelling, and foul odor.  Wash your hands before and after touching your puncture site.  No heavy lifting for 1 week.  No driving for 24 hours.  You may resume your regular diet today.  You may resume your regular activities tomorrow.  No heating pad on the puncture site for at least 24 hours afterwards. You may use an ice pack for pain or swelling for no longer than 15 minutes at a time for 3-4 times a day. Do not place ice directly on your skin.  Relax on the day of the injection.  Do not run on machines or do any strenuous exercises for at least 24 hours afterwards.  It may take a few days before you will  feel the effects of the injection.      Contact your doctor for:  Increase pain not controlled with medication.  Any new numbness or tingling.  Fever greater than 101 degree Fahrenheit that does not break with medication.  Any signs or symptoms of infection: redness, warmth, pus or drainage, increase swelling, and foul odor at the puncture site.

## 2023-11-20 ENCOUNTER — HOSPITAL ENCOUNTER (OUTPATIENT)
Facility: HOSPITAL | Age: 38
Discharge: HOME | End: 2023-11-20
Attending: ANESTHESIOLOGY | Admitting: ANESTHESIOLOGY
Payer: COMMERCIAL

## 2023-11-20 DIAGNOSIS — M54.9 CHRONIC BACK PAIN GREATER THAN 3 MONTHS DURATION: ICD-10-CM

## 2023-11-20 DIAGNOSIS — G89.29 CHRONIC BACK PAIN GREATER THAN 3 MONTHS DURATION: ICD-10-CM

## 2023-11-20 PROCEDURE — 64483 NJX AA&/STRD TFRM EPI L/S 1: CPT | Mod: 50,,, | Performed by: ANESTHESIOLOGY

## 2023-11-20 PROCEDURE — 64483 NJX AA&/STRD TFRM EPI L/S 1: CPT | Mod: 50 | Performed by: ANESTHESIOLOGY

## 2023-11-20 PROCEDURE — 64483 PR EPIDURAL INJ, ANES/STEROID, TRANSFORAMINAL, LUMB/SACR, SNGL LEVL: ICD-10-PCS | Mod: 50,,, | Performed by: ANESTHESIOLOGY

## 2023-11-20 PROCEDURE — 63600175 PHARM REV CODE 636 W HCPCS: Performed by: ANESTHESIOLOGY

## 2023-11-20 PROCEDURE — 25000003 PHARM REV CODE 250: Performed by: ANESTHESIOLOGY

## 2023-11-20 RX ORDER — DEXAMETHASONE SODIUM PHOSPHATE 10 MG/ML
INJECTION INTRAMUSCULAR; INTRAVENOUS
Status: DISCONTINUED | OUTPATIENT
Start: 2023-11-20 | End: 2023-11-20 | Stop reason: HOSPADM

## 2023-11-20 RX ORDER — LIDOCAINE HYDROCHLORIDE 10 MG/ML
1 INJECTION, SOLUTION EPIDURAL; INFILTRATION; INTRACAUDAL; PERINEURAL ONCE AS NEEDED
Status: DISCONTINUED | OUTPATIENT
Start: 2023-11-20 | End: 2023-11-20 | Stop reason: HOSPADM

## 2023-11-20 RX ORDER — SODIUM CHLORIDE 9 MG/ML
INJECTION, SOLUTION INTRAVENOUS CONTINUOUS
Status: DISCONTINUED | OUTPATIENT
Start: 2023-11-20 | End: 2023-11-20 | Stop reason: HOSPADM

## 2023-11-20 RX ORDER — BUPIVACAINE HYDROCHLORIDE 2.5 MG/ML
INJECTION, SOLUTION EPIDURAL; INFILTRATION; INTRACAUDAL
Status: DISCONTINUED | OUTPATIENT
Start: 2023-11-20 | End: 2023-11-20 | Stop reason: HOSPADM

## 2023-11-20 RX ORDER — LIDOCAINE HYDROCHLORIDE 10 MG/ML
INJECTION, SOLUTION EPIDURAL; INFILTRATION; INTRACAUDAL; PERINEURAL
Status: DISCONTINUED
Start: 2023-11-20 | End: 2023-11-20 | Stop reason: HOSPADM

## 2023-11-20 RX ORDER — DEXAMETHASONE SODIUM PHOSPHATE 10 MG/ML
INJECTION INTRAMUSCULAR; INTRAVENOUS
Status: DISCONTINUED
Start: 2023-11-20 | End: 2023-11-20 | Stop reason: HOSPADM

## 2023-11-20 RX ORDER — LIDOCAINE HYDROCHLORIDE 10 MG/ML
INJECTION, SOLUTION EPIDURAL; INFILTRATION; INTRACAUDAL; PERINEURAL
Status: DISCONTINUED | OUTPATIENT
Start: 2023-11-20 | End: 2023-11-20 | Stop reason: HOSPADM

## 2023-11-20 NOTE — DISCHARGE SUMMARY
[facility] - Periop Services  Discharge Note  Short Stay    Procedure(s) (LRB):  INJECTION, STEROID, EPIDURAL, TRANSFORAMINAL APPROACH Bilateral L5 No Sedation  Local only (Bilateral)      OUTCOME: Patient tolerated treatment/procedure well without complication and is now ready for discharge.    DISPOSITION: Home or Self Care    FINAL DIAGNOSIS:  <principal problem not specified>    FOLLOWUP: In clinic    DISCHARGE INSTRUCTIONS:  No discharge procedures on file.     TIME SPENT ON DISCHARGE: 5 minutes

## 2023-11-25 VITALS
WEIGHT: 141.13 LBS | HEIGHT: 62 IN | OXYGEN SATURATION: 100 % | TEMPERATURE: 98 F | DIASTOLIC BLOOD PRESSURE: 82 MMHG | RESPIRATION RATE: 16 BRPM | BODY MASS INDEX: 25.97 KG/M2 | SYSTOLIC BLOOD PRESSURE: 118 MMHG | HEART RATE: 66 BPM

## 2023-12-05 ENCOUNTER — OFFICE VISIT (OUTPATIENT)
Dept: PAIN MEDICINE | Facility: CLINIC | Age: 38
End: 2023-12-05
Payer: COMMERCIAL

## 2023-12-05 VITALS
WEIGHT: 137 LBS | HEART RATE: 65 BPM | SYSTOLIC BLOOD PRESSURE: 113 MMHG | DIASTOLIC BLOOD PRESSURE: 76 MMHG | HEIGHT: 62 IN | BODY MASS INDEX: 25.21 KG/M2

## 2023-12-05 DIAGNOSIS — M54.9 CHRONIC BACK PAIN GREATER THAN 3 MONTHS DURATION: Primary | ICD-10-CM

## 2023-12-05 DIAGNOSIS — G89.29 CHRONIC BACK PAIN GREATER THAN 3 MONTHS DURATION: Primary | ICD-10-CM

## 2023-12-05 DIAGNOSIS — M51.36 LUMBAR DEGENERATIVE DISC DISEASE: ICD-10-CM

## 2023-12-05 PROBLEM — M51.369 LUMBAR DEGENERATIVE DISC DISEASE: Status: ACTIVE | Noted: 2023-12-05

## 2023-12-05 PROCEDURE — 3008F BODY MASS INDEX DOCD: CPT | Mod: CPTII,,, | Performed by: ANESTHESIOLOGY

## 2023-12-05 PROCEDURE — 99214 PR OFFICE/OUTPT VISIT, EST, LEVL IV, 30-39 MIN: ICD-10-PCS | Mod: ,,, | Performed by: ANESTHESIOLOGY

## 2023-12-05 PROCEDURE — 3008F PR BODY MASS INDEX (BMI) DOCUMENTED: ICD-10-PCS | Mod: CPTII,,, | Performed by: ANESTHESIOLOGY

## 2023-12-05 PROCEDURE — 3074F PR MOST RECENT SYSTOLIC BLOOD PRESSURE < 130 MM HG: ICD-10-PCS | Mod: CPTII,,, | Performed by: ANESTHESIOLOGY

## 2023-12-05 PROCEDURE — 99214 OFFICE O/P EST MOD 30 MIN: CPT | Mod: ,,, | Performed by: ANESTHESIOLOGY

## 2023-12-05 PROCEDURE — 3074F SYST BP LT 130 MM HG: CPT | Mod: CPTII,,, | Performed by: ANESTHESIOLOGY

## 2023-12-05 PROCEDURE — 1160F PR REVIEW ALL MEDS BY PRESCRIBER/CLIN PHARMACIST DOCUMENTED: ICD-10-PCS | Mod: CPTII,,, | Performed by: ANESTHESIOLOGY

## 2023-12-05 PROCEDURE — 1159F PR MEDICATION LIST DOCUMENTED IN MEDICAL RECORD: ICD-10-PCS | Mod: CPTII,,, | Performed by: ANESTHESIOLOGY

## 2023-12-05 PROCEDURE — 3078F DIAST BP <80 MM HG: CPT | Mod: CPTII,,, | Performed by: ANESTHESIOLOGY

## 2023-12-05 PROCEDURE — 1160F RVW MEDS BY RX/DR IN RCRD: CPT | Mod: CPTII,,, | Performed by: ANESTHESIOLOGY

## 2023-12-05 PROCEDURE — 3078F PR MOST RECENT DIASTOLIC BLOOD PRESSURE < 80 MM HG: ICD-10-PCS | Mod: CPTII,,, | Performed by: ANESTHESIOLOGY

## 2023-12-05 PROCEDURE — 1159F MED LIST DOCD IN RCRD: CPT | Mod: CPTII,,, | Performed by: ANESTHESIOLOGY

## 2023-12-05 NOTE — PROGRESS NOTES
Subjective:      Patient ID: Padmini La is a 38 y.o. female.    Chief Complaint: Low-back Pain (Post op [facility] TFESI L5 11/20/23 C/O pain level 7, pt states not sure if procedure is helping or not, had a pain free day yesterday but is currently having pain, Taking Tramadol PRN.)    Referred by: No ref. provider found       Low-back Pain    :  This is a pleasant 38-year-old  female patient presenting for pain associated with sciatica after completing physical therapy.  PT order was originally placed to evaluate and treat L5 + S1 annular fissure and lumbar disc bulge and DDD for 2-3 times a week for 6-8 weeks to include ionophoresis  along with manual PT + exercises.  To cause more than $100 dollars out of  pocket as insurance does not cover this.  Patient reports physical therapy helped a little with strengthening however her pain is still present just not as intense.  She also reports more strength to her legs.  Her pain score has remained the same since last office visit of a 2/10.     Pain started In May when she pivoted wrong while playing ball with her kids and had severe pain with bilateral hips.  Subsequently she was unable to walk temporarily during that event.  She was able to ambulate shortly after.  She has also seen a massage therapist for this pain that was not successful.     Pain is not as intense as it was prior as pain was described as a burning knife type sensation that is also achy in will spasm at times.  Her pain occurs daily and is increased with prolonged standing chores and playing with the kids.  She notices her pain is reduced when she goes to the gym and does resistance training and lunges as this will decrease her pain.  Additionally she reports it feels like this resets everything.  She has intermittent groin pain left greater than right.  She also has bilateral hip pain left greater than right and bilateral buttock pain with intermittent radiation down bilateral legs on the  "lateral side that stops at the posterior knee.(L5) Pain is no longer radiating  to top of both thighs (L4) or intermittent radiation to bilateral inner thighs. Sometimes the locations of her pain changes down the leg.  Pain score today is 2/10.  Pain will reduce when she works with a massage therapist and is lying down.     She has never completed epidural steroid injections.  No history of an implanted pacemaker, defibrillator or spinal cord stimulator.    She underwent bilateral L5 transforaminal epidural steroid injections a couple weeks ago.  She states that yesterday she was pain free, but she is having pain again today.    Vital signs:   Vitals:    12/05/23 1124 12/05/23 1125   BP: 113/76    Pulse: 65    Weight: 62.1 kg (137 lb)    Height: 5' 2" (1.575 m)    PainSc:    7     Body mass index is 25.06 kg/m².  Pain Disability Index (PDI): 13       Interventional Pain History  None    ROS:  Low back and leg pain    FINDINGS:  There are 5 non-rib-bearing lumbar type vertebral bodies.  Lumbar spine alignment is anatomic without spondylolisthesis or pars interarticularis fracture.  The T12 and lumbar vertebral bodies are normal in morphology and there is no acute or chronic fracture.  There is a hemangioma at the superior L1 vertebral body with no additional osseous lesion or marrow signal abnormality.  The visualized distal thoracic spinal cord and conus are normal with the conus terminating at the upper L1 vertebral body level.  No paravertebral soft tissue abnormality is identified.  Additional changes of the spine on a level by level basis are as follows:     T12-L1: Normal on sagittal imaging.     L1-L2 through L3-L4: No significant abnormality or stenosis.     L4-L5: Minimal bilateral degenerative facet change with no additional abnormality and no stenosis.     L5-S1: Intervertebral disc desiccation and mild-to-moderate disc space narrowing.  Shallow broad-based central disc protrusion measuring 4 x 13 mm in AP " and TV extent and effacing the ventral epidural fat with no significant impingement upon the ventral thecal sac.  A posterior peripheral annular fissure is suggested.  There is also minimal bilateral degenerative facet change.  No spinal canal or lateral recess stenosis and no neural foramen stenosis.  The visualized sacrum and sacroiliac joints are normal.     Impression:     Degenerative disc disease at L5-S1 with shallow broad-based central disc protrusion and posterior annular fissure suggested.     Minimal bilateral degenerative facet change at L4-S1.     No spinal canal, lateral recess, or neural foramen stenosis.           Objective:      Physical Exam  General: Well developed; normal weight.  A&O x 3; No anxiety/depression; NAD  Mental Status: Oriented to person, palce and time. Displays appropriate mood & affect.  Head: Norm cephalic and atraumatic  Neck:  No cervical paraspinal banding.  Full range of motion with lateral turning and cervical flexion +extension.  Eyes: normal conjunctiva, normal lids, normal pupils  ENT and mouth: normal external ear, nose, and no lesions noted on the lips.  Respiratory: Symmetrical, Unlabored. No dyspnea  CV: normal rhythm and rate. No peripheral edema.   Abdomen: Non-distended     Extremities:  Gen: No cyanosis or tenderness to palpation bilateral upper and lower extremities  Skin: Warm, pink, dry, no rashes, no lesions on the lumbar spine  Strength: 5/5 motor strength bilateral upper and lower extremities  ROM: Full ROM in bilateral knees and ankles without pain or instability.     Neuro:  Gait: no altalgic lean, normal toe and heel raise. Independent ambulator.  DTR's: 2+ in bilateral patellar, and ankle  Sensory: Intact to light touch bilateral  upper and lower extremities     Spine: Normal lordosis. No scoliosis  L-spine ROM: Full ROM to flexion, extension, bilateral rotation,   Straight Leg Raise:  Positive bilaterally.  SI Joint: No tenderness to palpation  bilaterally.         Assessment:     A/P: Patient completed PT for bilateral low  back pain which did help reduce pain intensity slightly with dry needling.  However her primary pain remains located to bilateral low back radiating more intense down the lateral leg to her feet as well as radiating down the posterior leg to her feet.  This occurs with prolonged walking, household chores, and day-to-day activities.   Encounter Diagnoses   Name Primary?    Chronic back pain greater than 3 months duration Yes    Lumbar degenerative disc disease            Plan:       Padmini was seen today for low-back pain.    Diagnoses and all orders for this visit:    Chronic back pain greater than 3 months duration    Lumbar degenerative disc disease      Overall her low back pain is better since her injections a couple weeks ago.  She would like to schedule another appointment to come back and discuss her neck issues.

## 2024-01-16 ENCOUNTER — OFFICE VISIT (OUTPATIENT)
Dept: PAIN MEDICINE | Facility: CLINIC | Age: 39
End: 2024-01-16
Payer: COMMERCIAL

## 2024-01-16 VITALS
BODY MASS INDEX: 25.21 KG/M2 | SYSTOLIC BLOOD PRESSURE: 115 MMHG | DIASTOLIC BLOOD PRESSURE: 86 MMHG | TEMPERATURE: 98 F | WEIGHT: 137 LBS | HEART RATE: 76 BPM | HEIGHT: 62 IN

## 2024-01-16 DIAGNOSIS — M51.36 LUMBAR DEGENERATIVE DISC DISEASE: Primary | ICD-10-CM

## 2024-01-16 DIAGNOSIS — G89.29 CHRONIC NECK PAIN: ICD-10-CM

## 2024-01-16 DIAGNOSIS — G89.29 CHRONIC BACK PAIN GREATER THAN 3 MONTHS DURATION: ICD-10-CM

## 2024-01-16 DIAGNOSIS — M54.9 CHRONIC BACK PAIN GREATER THAN 3 MONTHS DURATION: ICD-10-CM

## 2024-01-16 DIAGNOSIS — M54.16 LUMBAR RADICULOPATHY: ICD-10-CM

## 2024-01-16 DIAGNOSIS — M54.2 CHRONIC NECK PAIN: ICD-10-CM

## 2024-01-16 PROCEDURE — 3074F SYST BP LT 130 MM HG: CPT | Mod: CPTII,,, | Performed by: ANESTHESIOLOGY

## 2024-01-16 PROCEDURE — 1159F MED LIST DOCD IN RCRD: CPT | Mod: CPTII,,, | Performed by: ANESTHESIOLOGY

## 2024-01-16 PROCEDURE — 3079F DIAST BP 80-89 MM HG: CPT | Mod: CPTII,,, | Performed by: ANESTHESIOLOGY

## 2024-01-16 PROCEDURE — 1160F RVW MEDS BY RX/DR IN RCRD: CPT | Mod: CPTII,,, | Performed by: ANESTHESIOLOGY

## 2024-01-16 PROCEDURE — 99214 OFFICE O/P EST MOD 30 MIN: CPT | Mod: ,,, | Performed by: ANESTHESIOLOGY

## 2024-01-16 PROCEDURE — 3008F BODY MASS INDEX DOCD: CPT | Mod: CPTII,,, | Performed by: ANESTHESIOLOGY

## 2024-01-16 NOTE — PROGRESS NOTES
Subjective:      Patient ID: Padmini La is a 38 y.o. female.    Chief Complaint: Back Pain (6 week f/u for back pain, completed P.T., RX and OTC medication for relief, pain level 3/10)    Referred by: No ref. provider found       Low-back Pain    Back Pain  :  This is a pleasant 38-year-old  female patient presenting for pain associated with sciatica after completing physical therapy.  PT order was originally placed to evaluate and treat L5 + S1 annular fissure and lumbar disc bulge and DDD for 2-3 times a week for 6-8 weeks to include ionophoresis along with manual PT + exercises. Patient reports physical therapy helped a little with strengthening however her pain is still present just not as intense.  She also reports more strength to her legs.  Her pain score has remained the same since last office visit of a 2/10.     Pain started In May when she pivoted wrong while playing ball with her kids and had severe pain with bilateral hips.  Subsequently she was unable to walk temporarily during that event.  She was able to ambulate shortly after.  She has also seen a massage therapist for this pain that was not successful.     Pain is not as intense as it was prior as pain was described as a burning knife type sensation that is also achy in will spasm at times.  Her pain occurs daily and is increased with prolonged standing chores and playing with the kids.  She notices her pain is reduced when she goes to the gym and does resistance training and lunges as this will decrease her pain.  Additionally she reports it feels like this resets everything.  She has intermittent groin pain left greater than right.  She also has bilateral hip pain left greater than right and bilateral buttock pain with intermittent radiation down bilateral legs on the lateral side that stops at the posterior knee.(L5) Pain is no longer radiating  to top of both thighs (L4) or intermittent radiation to bilateral inner thighs. Sometimes  "the locations of her pain changes down the leg.  Pain score today is 2/10.  Pain will reduce when she works with a massage therapist and is lying down.     She has never completed epidural steroid injections.  No history of an implanted pacemaker, defibrillator or spinal cord stimulator.    She underwent bilateral L5 transforaminal epidural steroid injections a couple months ago.  She was getting relief for a while, but it has now started to return.  She would like to have another injection done.  She will follow up complaining of worsening neck pain.    Vital signs:   Vitals:    01/16/24 1019 01/16/24 1022   BP: 115/86    Pulse: 76    Temp: 98.1 °F (36.7 °C)    TempSrc: Oral    Weight: 62.1 kg (137 lb)    Height: 5' 2" (1.575 m)    PainSc:    3     Body mass index is 25.06 kg/m².  Pain Disability Index (PDI): 17       Interventional Pain History  None    Review of Systems   Musculoskeletal:  Positive for back pain. :  Low back and leg pain    FINDINGS:  There are 5 non-rib-bearing lumbar type vertebral bodies.  Lumbar spine alignment is anatomic without spondylolisthesis or pars interarticularis fracture.  The T12 and lumbar vertebral bodies are normal in morphology and there is no acute or chronic fracture.  There is a hemangioma at the superior L1 vertebral body with no additional osseous lesion or marrow signal abnormality.  The visualized distal thoracic spinal cord and conus are normal with the conus terminating at the upper L1 vertebral body level.  No paravertebral soft tissue abnormality is identified.  Additional changes of the spine on a level by level basis are as follows:     T12-L1: Normal on sagittal imaging.     L1-L2 through L3-L4: No significant abnormality or stenosis.     L4-L5: Minimal bilateral degenerative facet change with no additional abnormality and no stenosis.     L5-S1: Intervertebral disc desiccation and mild-to-moderate disc space narrowing.  Shallow broad-based central disc " protrusion measuring 4 x 13 mm in AP and TV extent and effacing the ventral epidural fat with no significant impingement upon the ventral thecal sac.  A posterior peripheral annular fissure is suggested.  There is also minimal bilateral degenerative facet change.  No spinal canal or lateral recess stenosis and no neural foramen stenosis.  The visualized sacrum and sacroiliac joints are normal.     Impression:     Degenerative disc disease at L5-S1 with shallow broad-based central disc protrusion and posterior annular fissure suggested.     Minimal bilateral degenerative facet change at L4-S1.     No spinal canal, lateral recess, or neural foramen stenosis.           Objective:      Physical Exam  General: Well developed; normal weight.  A&O x 3; No anxiety/depression; NAD  Mental Status: Oriented to person, palce and time. Displays appropriate mood & affect.  Head: Norm cephalic and atraumatic  Neck:  No cervical paraspinal banding.  Full range of motion with lateral turning and cervical flexion +extension.  Eyes: normal conjunctiva, normal lids, normal pupils  ENT and mouth: normal external ear, nose, and no lesions noted on the lips.  Respiratory: Symmetrical, Unlabored. No dyspnea  CV: normal rhythm and rate. No peripheral edema.   Abdomen: Non-distended     Extremities:  Gen: No cyanosis or tenderness to palpation bilateral upper and lower extremities  Skin: Warm, pink, dry, no rashes, no lesions on the lumbar spine  Strength: 5/5 motor strength bilateral upper and lower extremities  ROM: Full ROM in bilateral knees and ankles without pain or instability.     Neuro:  Gait: no altalgic lean, normal toe and heel raise. Independent ambulator.  DTR's: 2+ in bilateral patellar, and ankle  Sensory: Intact to light touch bilateral  upper and lower extremities     Spine: Normal lordosis. No scoliosis  L-spine ROM: Full ROM to flexion, extension, bilateral rotation,   Straight Leg Raise:  Positive bilaterally.  SI Joint:  No tenderness to palpation bilaterally.         Assessment:     A/P: Patient completed PT for bilateral low  back pain which did help reduce pain intensity slightly with dry needling.  However her primary pain remains located to bilateral low back radiating more intense down the lateral leg to her feet as well as radiating down the posterior leg to her feet.  This occurs with prolonged walking, household chores, and day-to-day activities.   Encounter Diagnoses   Name Primary?    Lumbar degenerative disc disease Yes    Chronic back pain greater than 3 months duration            Plan:       Padmini was seen today for back pain.    Diagnoses and all orders for this visit:    Lumbar degenerative disc disease    Chronic back pain greater than 3 months duration      I am ordering an MRI of the cervical spine without contrast for further evaluation of her worsening neck pain, with findings of degeneration on her cervical spine x-rays.  She is also requesting to schedule another injection for her low back pain.  She got relief for a while after her last injections, but it is starting to return.  He is being scheduled for bilateral L5 transforaminal epidural steroid injections.  The plan was discussed with the patient and she wishes to proceed.

## 2024-02-23 ENCOUNTER — OFFICE VISIT (OUTPATIENT)
Dept: PAIN MEDICINE | Facility: CLINIC | Age: 39
End: 2024-02-23
Payer: COMMERCIAL

## 2024-02-23 VITALS
WEIGHT: 137 LBS | DIASTOLIC BLOOD PRESSURE: 89 MMHG | BODY MASS INDEX: 25.21 KG/M2 | HEIGHT: 62 IN | SYSTOLIC BLOOD PRESSURE: 129 MMHG

## 2024-02-23 DIAGNOSIS — M54.12 CERVICAL RADICULITIS: Primary | ICD-10-CM

## 2024-02-23 DIAGNOSIS — M54.2 CERVICALGIA: ICD-10-CM

## 2024-02-23 DIAGNOSIS — M51.36 LUMBAR DEGENERATIVE DISC DISEASE: ICD-10-CM

## 2024-02-23 DIAGNOSIS — G89.29 CHRONIC BACK PAIN GREATER THAN 3 MONTHS DURATION: ICD-10-CM

## 2024-02-23 DIAGNOSIS — M54.9 CHRONIC BACK PAIN GREATER THAN 3 MONTHS DURATION: ICD-10-CM

## 2024-02-23 PROCEDURE — 1160F RVW MEDS BY RX/DR IN RCRD: CPT | Mod: CPTII,,, | Performed by: ANESTHESIOLOGY

## 2024-02-23 PROCEDURE — 3074F SYST BP LT 130 MM HG: CPT | Mod: CPTII,,, | Performed by: ANESTHESIOLOGY

## 2024-02-23 PROCEDURE — 99213 OFFICE O/P EST LOW 20 MIN: CPT | Mod: ,,, | Performed by: ANESTHESIOLOGY

## 2024-02-23 PROCEDURE — 3008F BODY MASS INDEX DOCD: CPT | Mod: CPTII,,, | Performed by: ANESTHESIOLOGY

## 2024-02-23 PROCEDURE — 1159F MED LIST DOCD IN RCRD: CPT | Mod: CPTII,,, | Performed by: ANESTHESIOLOGY

## 2024-02-23 PROCEDURE — 3079F DIAST BP 80-89 MM HG: CPT | Mod: CPTII,,, | Performed by: ANESTHESIOLOGY

## 2024-02-23 NOTE — PROGRESS NOTES
Subjective:      Patient ID: Padmini La is a 38 y.o. female.    Chief Complaint: Neck Pain ([facility] denied needing to reappeal after office notes, C/O pain level 2,Taking Lyrica for pain. )    Referred by: No ref. provider found     She complains of neck pain when she has to look down for awhile.  She has pain that radiates down the bilateral upper extremities and stopped proximal to the elbows.  She has increased pain with constant motion of her arms such as painting, sanding, and scrubbing.  She had x-rays of her cervical spine which revealed some degenerative changes.    Low-back Pain    Back Pain    Neck Pain     This is a 38-year-old female who returns to clinic today for follow up of her chronic neck pain and low back pain.  PT order was originally placed to evaluate and treat L5 + S1 annular fissure and lumbar disc bulge and DDD for 2-3 times a week for 6-8 weeks to include ionophoresis along with manual PT + exercises. Patient reports physical therapy helped a little with strengthening however her pain is still present just not as intense.  She also reports more strength to her legs.  Her pain score has remained the same since last office visit of a 2/10.     Pain started In May when she pivoted wrong while playing ball with her kids and had severe pain with bilateral hips.  Subsequently she was unable to walk temporarily during that event.  She was able to ambulate shortly after.  She has also seen a massage therapist for this pain that was not successful.     Pain is not as intense as it was prior as pain was described as a burning knife type sensation that is also achy in will spasm at times.  Her pain occurs daily and is increased with prolonged standing chores and playing with the kids.  She notices her pain is reduced when she goes to the gym and does resistance training and lunges as this will decrease her pain.  Additionally she reports it feels like this resets everything.  She has  "intermittent groin pain left greater than right.  She also has bilateral hip pain left greater than right and bilateral buttock pain with intermittent radiation down bilateral legs on the lateral side that stops at the posterior knee.(L5) Pain is no longer radiating  to top of both thighs (L4) or intermittent radiation to bilateral inner thighs. Sometimes the locations of her pain changes down the leg.  Pain score today is 2/10.  Pain will reduce when she works with a massage therapist and is lying down.    She underwent bilateral L5 transforaminal epidural steroid injections a couple months ago.  She was getting relief for a while, but it has now started to return.  She would like to have another injection done.  She will follow up complaining of worsening neck pain.    Vital signs:   Vitals:    02/23/24 1103 02/23/24 1105   BP: 129/89    Weight: 62.1 kg (137 lb)    Height: 5' 2" (1.575 m)    PainSc:    2     Body mass index is 25.06 kg/m².  Pain Disability Index (PDI): 16       Interventional Pain History  None    Review of Systems   Musculoskeletal:  Positive for back pain and neck pain.   :  Low back and leg pain    FINDINGS:  There are 5 non-rib-bearing lumbar type vertebral bodies.  Lumbar spine alignment is anatomic without spondylolisthesis or pars interarticularis fracture.  The T12 and lumbar vertebral bodies are normal in morphology and there is no acute or chronic fracture.  There is a hemangioma at the superior L1 vertebral body with no additional osseous lesion or marrow signal abnormality.  The visualized distal thoracic spinal cord and conus are normal with the conus terminating at the upper L1 vertebral body level.  No paravertebral soft tissue abnormality is identified.  Additional changes of the spine on a level by level basis are as follows:     T12-L1: Normal on sagittal imaging.     L1-L2 through L3-L4: No significant abnormality or stenosis.     L4-L5: Minimal bilateral degenerative facet " change with no additional abnormality and no stenosis.     L5-S1: Intervertebral disc desiccation and mild-to-moderate disc space narrowing.  Shallow broad-based central disc protrusion measuring 4 x 13 mm in AP and TV extent and effacing the ventral epidural fat with no significant impingement upon the ventral thecal sac.  A posterior peripheral annular fissure is suggested.  There is also minimal bilateral degenerative facet change.  No spinal canal or lateral recess stenosis and no neural foramen stenosis.  The visualized sacrum and sacroiliac joints are normal.     Impression:     Degenerative disc disease at L5-S1 with shallow broad-based central disc protrusion and posterior annular fissure suggested.     Minimal bilateral degenerative facet change at L4-S1.     No spinal canal, lateral recess, or neural foramen stenosis.           Objective:      Physical Exam  General: Well developed; normal weight.  A&O x 3; No anxiety/depression; NAD  Mental Status: Oriented to person, palce and time. Displays appropriate mood & affect.  Head: Norm cephalic and atraumatic  Neck:  No cervical paraspinal banding.  Full range of motion with lateral turning and cervical flexion +extension.  Eyes: normal conjunctiva, normal lids, normal pupils  ENT and mouth: normal external ear, nose, and no lesions noted on the lips.  Respiratory: Symmetrical, Unlabored. No dyspnea  CV: normal rhythm and rate. No peripheral edema.   Abdomen: Non-distended     Extremities:  Gen: No cyanosis or tenderness to palpation bilateral upper and lower extremities  Skin: Warm, pink, dry, no rashes, no lesions on the lumbar spine  Strength: 5/5 motor strength bilateral upper and lower extremities  ROM: Full ROM in bilateral knees and ankles without pain or instability.     Neuro:  Gait: no altalgic lean, normal toe and heel raise. Independent ambulator.  DTR's: 2+ in bilateral patellar, and ankle  Sensory: Intact to light touch bilateral  upper and lower  extremities     Spine: Normal lordosis. No scoliosis  L-spine ROM: Full ROM to flexion, extension, bilateral rotation,   Straight Leg Raise:  Positive bilaterally.  SI Joint: No tenderness to palpation bilaterally.         Assessment:     A/P: Patient completed PT for bilateral low  back pain which did help reduce pain intensity slightly with dry needling.  However her primary pain remains located to bilateral low back radiating more intense down the lateral leg to her feet as well as radiating down the posterior leg to her feet.  This occurs with prolonged walking, household chores, and day-to-day activities.   Encounter Diagnoses   Name Primary?    Chronic back pain greater than 3 months duration Yes    Lumbar degenerative disc disease     Cervicalgia     Cervical radiculitis            Plan:       Padmini was seen today for neck pain.    Diagnoses and all orders for this visit:    Chronic back pain greater than 3 months duration  -     MRI Cervical Spine Without Contrast; Future  -     MRI Cervical Spine Without Contrast    Lumbar degenerative disc disease  -     MRI Cervical Spine Without Contrast; Future  -     MRI Cervical Spine Without Contrast    Cervicalgia    Cervical radiculitis    She is scheduled for bilateral L5 transforaminal epidural steroid injections in a couple of weeks.  I am ordering an MRI of the cervical spine without contrast to further evaluate her worsening neck pain radiating into the bilateral upper extremities, and findings of degenerative changes on her cervical spine x-rays.

## 2024-02-28 PROBLEM — M54.2 CERVICALGIA: Status: ACTIVE | Noted: 2024-02-28

## 2024-03-04 ENCOUNTER — HOSPITAL ENCOUNTER (OUTPATIENT)
Facility: HOSPITAL | Age: 39
Discharge: HOME | End: 2024-03-04
Attending: ANESTHESIOLOGY | Admitting: ANESTHESIOLOGY
Payer: COMMERCIAL

## 2024-03-04 DIAGNOSIS — G89.29 CHRONIC BACK PAIN GREATER THAN 3 MONTHS DURATION: ICD-10-CM

## 2024-03-04 DIAGNOSIS — M51.36 LUMBAR DEGENERATIVE DISC DISEASE: ICD-10-CM

## 2024-03-04 DIAGNOSIS — M54.9 CHRONIC BACK PAIN GREATER THAN 3 MONTHS DURATION: ICD-10-CM

## 2024-03-04 DIAGNOSIS — M54.16 LUMBAR RADICULOPATHY: ICD-10-CM

## 2024-03-04 LAB
B-HCG UR QL: NEGATIVE
CTP QC/QA: YES

## 2024-03-04 PROCEDURE — 64483 NJX AA&/STRD TFRM EPI L/S 1: CPT | Mod: 50 | Performed by: ANESTHESIOLOGY

## 2024-03-04 PROCEDURE — 81025 URINE PREGNANCY TEST: CPT | Performed by: ANESTHESIOLOGY

## 2024-03-04 PROCEDURE — 64483 NJX AA&/STRD TFRM EPI L/S 1: CPT | Mod: 50,,, | Performed by: ANESTHESIOLOGY

## 2024-03-04 PROCEDURE — 25000003 PHARM REV CODE 250: Performed by: ANESTHESIOLOGY

## 2024-03-04 PROCEDURE — 63600175 PHARM REV CODE 636 W HCPCS: Mod: JZ,JG | Performed by: ANESTHESIOLOGY

## 2024-03-04 RX ORDER — LIDOCAINE HYDROCHLORIDE 10 MG/ML
1 INJECTION, SOLUTION EPIDURAL; INFILTRATION; INTRACAUDAL; PERINEURAL ONCE AS NEEDED
Status: CANCELLED | OUTPATIENT
Start: 2024-03-04 | End: 2035-07-31

## 2024-03-04 RX ORDER — LIDOCAINE HYDROCHLORIDE 10 MG/ML
INJECTION, SOLUTION EPIDURAL; INFILTRATION; INTRACAUDAL; PERINEURAL
Status: DISCONTINUED
Start: 2024-03-04 | End: 2024-03-04 | Stop reason: HOSPADM

## 2024-03-04 RX ORDER — BUPIVACAINE HYDROCHLORIDE 2.5 MG/ML
INJECTION, SOLUTION EPIDURAL; INFILTRATION; INTRACAUDAL
Status: DISCONTINUED | OUTPATIENT
Start: 2024-03-04 | End: 2024-03-04 | Stop reason: HOSPADM

## 2024-03-04 RX ORDER — SODIUM CHLORIDE, SODIUM LACTATE, POTASSIUM CHLORIDE, CALCIUM CHLORIDE 600; 310; 30; 20 MG/100ML; MG/100ML; MG/100ML; MG/100ML
INJECTION, SOLUTION INTRAVENOUS CONTINUOUS
Status: CANCELLED | OUTPATIENT
Start: 2024-03-04

## 2024-03-04 RX ORDER — DEXAMETHASONE SODIUM PHOSPHATE 10 MG/ML
INJECTION INTRAMUSCULAR; INTRAVENOUS
Status: DISCONTINUED | OUTPATIENT
Start: 2024-03-04 | End: 2024-03-04 | Stop reason: HOSPADM

## 2024-03-04 RX ORDER — LIDOCAINE HYDROCHLORIDE 10 MG/ML
INJECTION, SOLUTION EPIDURAL; INFILTRATION; INTRACAUDAL; PERINEURAL
Status: DISCONTINUED | OUTPATIENT
Start: 2024-03-04 | End: 2024-03-04 | Stop reason: HOSPADM

## 2024-03-04 RX ORDER — DEXAMETHASONE SODIUM PHOSPHATE 10 MG/ML
INJECTION INTRAMUSCULAR; INTRAVENOUS
Status: DISCONTINUED
Start: 2024-03-04 | End: 2024-03-04 | Stop reason: HOSPADM

## 2024-03-04 RX ORDER — SODIUM CHLORIDE 9 MG/ML
INJECTION, SOLUTION INTRAVENOUS CONTINUOUS
Status: CANCELLED | OUTPATIENT
Start: 2024-03-04

## 2024-03-04 RX ORDER — SODIUM CHLORIDE, SODIUM GLUCONATE, SODIUM ACETATE, POTASSIUM CHLORIDE AND MAGNESIUM CHLORIDE 30; 37; 368; 526; 502 MG/100ML; MG/100ML; MG/100ML; MG/100ML; MG/100ML
INJECTION, SOLUTION INTRAVENOUS CONTINUOUS
Status: DISCONTINUED | OUTPATIENT
Start: 2024-03-04 | End: 2024-03-04 | Stop reason: HOSPADM

## 2024-03-04 NOTE — OP NOTE
Procedure:    Left L5  transforaminal epidural steroid injection    Right L5  transforaminal epidural steroid injection    Pre-Procedure Diagnoses:  Chronic back pain greater than 3 months  Lumbar degenerative disc disease  Lumbar radiculopathy  Lumbar disc displacement    Post-Procedure Diagnoses:  Chronic back pain greater than 3 months  Lumbar degenerative disc disease  Lumbar radiculopathy  Lumbar disc displacement    Anesthesia:  Local only    Estimated Blood Loss:  < 2 ML    Consent:  The procedure, risks, benefits, and alternatives were discussed with the patient.  The patient voiced understanding and fully informed written consent was obtained.    Description of the Procedure:  The patient was taken to the operating room and placed in the prone position. The skin overlying the lumbar spine was prepped with Chloraprep and draped in the usual sterile fashion.  An oblique fluoroscopic view was obtained on the left side at L5, with the superior articular process of the sacral ala aligned with the pedicle.  Skin anesthesia was achieved using 2 mL of lidocaine 1%.  A 22-gauge 3.5 -inch Quinke spinal needle was inserted and advanced under intermittent fluoroscopic views into the epidural space. Proper needle position was confirmed under AP, oblique, and lateral fluoroscopic views.  Negative aspiration for blood or CSF was confirmed. 1 mL of contrast was injected, which revealed spread into the epidural space.  Then a combination of 5 mg of dexamethasone with 1 mL of 0.25% bupivacaine was easily injected.   There was no pain on injection. The needle was removed intact and bleeding was nil.  The same procedure was repeated in identical fashion on the right side at L5 .  Sterile bandages were applied. The patient was taken to the recovery room for further observation in stable condition. The patient was then discharged home with no complications.

## 2024-03-04 NOTE — PLAN OF CARE
Discharge criteria met per Arley.  Will discharge the patient home with family in stable condition.  Discharge instructions completed, understanding verbalized.

## 2024-03-04 NOTE — DISCHARGE SUMMARY
[facility] Surgical - Periop Services  Discharge Note  Short Stay    Procedure(s) (LRB):  INJECTION, STEROID, EPIDURAL, TRANSFORAMINAL APPROACH  Bilateral L5   * patient does not wants sedation (Bilateral)      OUTCOME: Patient tolerated treatment/procedure well without complication and is now ready for discharge.    DISPOSITION: Home or Self Care    FINAL DIAGNOSIS:  <principal problem not specified>    FOLLOWUP: In clinic    DISCHARGE INSTRUCTIONS:  No discharge procedures on file.     TIME SPENT ON DISCHARGE: 5 minutes

## 2024-03-05 ENCOUNTER — TELEPHONE (OUTPATIENT)
Dept: PAIN MEDICINE | Facility: CLINIC | Age: 39
End: 2024-03-05
Payer: COMMERCIAL

## 2024-03-05 ENCOUNTER — PATIENT MESSAGE (OUTPATIENT)
Dept: ADMINISTRATIVE | Facility: OTHER | Age: 39
End: 2024-03-05
Payer: COMMERCIAL

## 2024-03-05 NOTE — TELEPHONE ENCOUNTER
Pt contacted office stating both arms had a burning sensation, sharp stabbing feeling in both wrists to thumbs which was radiating to both shoulders, she asks if this could be from the injection she received 3/04/24 or fibromyalgia?   Informed pt if symptoms get worse to go to ER.  Please advise thanks

## 2024-03-06 ENCOUNTER — PATIENT MESSAGE (OUTPATIENT)
Dept: ADMINISTRATIVE | Facility: OTHER | Age: 39
End: 2024-03-06
Payer: COMMERCIAL

## 2024-03-06 VITALS
TEMPERATURE: 98 F | HEIGHT: 62 IN | OXYGEN SATURATION: 100 % | BODY MASS INDEX: 26.13 KG/M2 | RESPIRATION RATE: 18 BRPM | WEIGHT: 142 LBS | HEART RATE: 59 BPM | DIASTOLIC BLOOD PRESSURE: 81 MMHG | SYSTOLIC BLOOD PRESSURE: 125 MMHG

## 2024-03-06 NOTE — TELEPHONE ENCOUNTER
I'm not sure whey she is experiencing this.  I can send in a medrol dose pack to see if it would help calm things down, if she wants to try that.

## 2024-03-18 ENCOUNTER — PATIENT MESSAGE (OUTPATIENT)
Dept: GASTROENTEROLOGY | Facility: CLINIC | Age: 39
End: 2024-03-18
Payer: COMMERCIAL

## 2024-03-19 ENCOUNTER — OFFICE VISIT (OUTPATIENT)
Dept: PAIN MEDICINE | Facility: CLINIC | Age: 39
End: 2024-03-19
Payer: COMMERCIAL

## 2024-03-19 VITALS
HEART RATE: 77 BPM | WEIGHT: 141 LBS | HEIGHT: 62 IN | DIASTOLIC BLOOD PRESSURE: 82 MMHG | SYSTOLIC BLOOD PRESSURE: 119 MMHG | BODY MASS INDEX: 25.95 KG/M2

## 2024-03-19 DIAGNOSIS — M54.9 CHRONIC BACK PAIN GREATER THAN 3 MONTHS DURATION: ICD-10-CM

## 2024-03-19 DIAGNOSIS — M51.36 LUMBAR DEGENERATIVE DISC DISEASE: ICD-10-CM

## 2024-03-19 DIAGNOSIS — M50.20 CERVICAL DISC DISPLACEMENT: Primary | ICD-10-CM

## 2024-03-19 DIAGNOSIS — G89.29 CHRONIC BACK PAIN GREATER THAN 3 MONTHS DURATION: ICD-10-CM

## 2024-03-19 DIAGNOSIS — M54.2 CERVICALGIA: ICD-10-CM

## 2024-03-19 DIAGNOSIS — M54.12 CERVICAL RADICULITIS: ICD-10-CM

## 2024-03-19 PROCEDURE — 1160F RVW MEDS BY RX/DR IN RCRD: CPT | Mod: CPTII,,, | Performed by: ANESTHESIOLOGY

## 2024-03-19 PROCEDURE — 3079F DIAST BP 80-89 MM HG: CPT | Mod: CPTII,,, | Performed by: ANESTHESIOLOGY

## 2024-03-19 PROCEDURE — 3008F BODY MASS INDEX DOCD: CPT | Mod: CPTII,,, | Performed by: ANESTHESIOLOGY

## 2024-03-19 PROCEDURE — 99214 OFFICE O/P EST MOD 30 MIN: CPT | Mod: ,,, | Performed by: ANESTHESIOLOGY

## 2024-03-19 PROCEDURE — 1159F MED LIST DOCD IN RCRD: CPT | Mod: CPTII,,, | Performed by: ANESTHESIOLOGY

## 2024-03-19 PROCEDURE — 3074F SYST BP LT 130 MM HG: CPT | Mod: CPTII,,, | Performed by: ANESTHESIOLOGY

## 2024-03-19 NOTE — PROGRESS NOTES
Subjective:      Patient ID: Padmini La is a 38 y.o. female.    Chief Complaint: Low-back Pain (Postop Valente TFESI L5 3/4/24 C/O pain level 2, states procedure helped some, Taking pain meds PRN.)    Referred by: No ref. provider found     She complains of neck pain when she has to look down for awhile.  She has pain that radiates down the bilateral upper extremities and stopped proximal to the elbows.  She has increased pain with constant motion of her arms such as painting, sanding, and scrubbing.  She had x-rays of her cervical spine which revealed some degenerative changes.    Low-back Pain    Back Pain    Neck Pain     This is a 38-year-old female who returns to clinic today for follow up of her chronic neck pain and low back pain.  PT order was originally placed to evaluate and treat L5 + S1 annular fissure and lumbar disc bulge and DDD for 2-3 times a week for 6-8 weeks to include ionophoresis along with manual PT + exercises. Patient reports physical therapy helped a little with strengthening however her pain is still present just not as intense.  She also reports more strength to her legs.  Her pain score has remained the same since last office visit of a 2/10.     Pain started In May when she pivoted wrong while playing ball with her kids and had severe pain with bilateral hips.  Subsequently she was unable to walk temporarily during that event.  She was able to ambulate shortly after.  She has also seen a massage therapist for this pain that was not successful.     Pain is not as intense as it was prior as pain was described as a burning knife type sensation that is also achy in will spasm at times.  Her pain occurs daily and is increased with prolonged standing chores and playing with the kids.  She notices her pain is reduced when she goes to the gym and does resistance training and lunges as this will decrease her pain.  Additionally she reports it feels like this resets everything.  She has  "intermittent groin pain left greater than right.  She also has bilateral hip pain left greater than right and bilateral buttock pain with intermittent radiation down bilateral legs on the lateral side that stops at the posterior knee.(L5) Pain is no longer radiating  to top of both thighs (L4) or intermittent radiation to bilateral inner thighs. Sometimes the locations of her pain changes down the leg.  Pain score today is 2/10.  Pain will reduce when she works with a massage therapist and is lying down.    She underwent bilateral L5 transforaminal epidural steroid injections a couple weeks ago.  She thinks that she has gotten a little bit of relief from that.  Her main complaint today is her neck pain.    Vital signs:   Vitals:    03/19/24 0840 03/19/24 0843   BP: 119/82    Pulse: 77    Weight: 64 kg (141 lb)    Height: 5' 2" (1.575 m)    PainSc:    2     Body mass index is 25.79 kg/m².  Pain Disability Index (PDI): 32       Interventional Pain History  None    Review of Systems   Musculoskeletal:  Positive for back pain and neck pain.   :  Low back and leg pain    FINDINGS:  There are 5 non-rib-bearing lumbar type vertebral bodies.  Lumbar spine alignment is anatomic without spondylolisthesis or pars interarticularis fracture.  The T12 and lumbar vertebral bodies are normal in morphology and there is no acute or chronic fracture.  There is a hemangioma at the superior L1 vertebral body with no additional osseous lesion or marrow signal abnormality.  The visualized distal thoracic spinal cord and conus are normal with the conus terminating at the upper L1 vertebral body level.  No paravertebral soft tissue abnormality is identified.  Additional changes of the spine on a level by level basis are as follows:     T12-L1: Normal on sagittal imaging.     L1-L2 through L3-L4: No significant abnormality or stenosis.     L4-L5: Minimal bilateral degenerative facet change with no additional abnormality and no stenosis.   "   L5-S1: Intervertebral disc desiccation and mild-to-moderate disc space narrowing.  Shallow broad-based central disc protrusion measuring 4 x 13 mm in AP and TV extent and effacing the ventral epidural fat with no significant impingement upon the ventral thecal sac.  A posterior peripheral annular fissure is suggested.  There is also minimal bilateral degenerative facet change.  No spinal canal or lateral recess stenosis and no neural foramen stenosis.  The visualized sacrum and sacroiliac joints are normal.     Impression:     Degenerative disc disease at L5-S1 with shallow broad-based central disc protrusion and posterior annular fissure suggested.     Minimal bilateral degenerative facet change at L4-S1.     No spinal canal, lateral recess, or neural foramen stenosis.           Objective:      Physical Exam  General: Well developed; normal weight.  A&O x 3; No anxiety/depression; NAD  Mental Status: Oriented to person, palce and time. Displays appropriate mood & affect.  Head: Norm cephalic and atraumatic  Neck:  No cervical paraspinal banding.  Full range of motion with lateral turning and cervical flexion +extension.  Eyes: normal conjunctiva, normal lids, normal pupils  ENT and mouth: normal external ear, nose, and no lesions noted on the lips.  Respiratory: Symmetrical, Unlabored. No dyspnea  CV: normal rhythm and rate. No peripheral edema.   Abdomen: Non-distended     Extremities:  Gen: No cyanosis or tenderness to palpation bilateral upper and lower extremities  Skin: Warm, pink, dry, no rashes, no lesions on the lumbar spine  Strength: 5/5 motor strength bilateral upper and lower extremities  ROM: Full ROM in bilateral knees and ankles without pain or instability.     Neuro:  Gait: no altalgic lean, normal toe and heel raise. Independent ambulator.  DTR's: 2+ in bilateral patellar, and ankle  Sensory: Intact to light touch bilateral  upper and lower extremities     Spine: Normal lordosis. No  scoliosis  L-spine ROM: Full ROM to flexion, extension, bilateral rotation,   Straight Leg Raise:  Positive bilaterally.  SI Joint: No tenderness to palpation bilaterally.         Assessment:      Encounter Diagnoses   Name Primary?    Lumbar degenerative disc disease Yes    Chronic back pain greater than 3 months duration     Cervicalgia     Cervical disc displacement            Plan:       Padmini was seen today for low-back pain.    Diagnoses and all orders for this visit:    Lumbar degenerative disc disease    Chronic back pain greater than 3 months duration    Cervicalgia    Cervical disc displacement          She is being scheduled for a cervical epidural steroid injection today to help with her chronic neck pain radiating into the upper extremities, consistent with findings of degenerative disc disease and disc herniation seen on her MRI.  The plan was discussed with the patient and she wishes to proceed.

## 2024-04-01 ENCOUNTER — DOCUMENTATION ONLY (OUTPATIENT)
Dept: RHEUMATOLOGY | Facility: CLINIC | Age: 39
End: 2024-04-01
Payer: COMMERCIAL

## 2024-04-01 NOTE — PROGRESS NOTES
Certified letter mailed to patient in   Regard to rheumatology provider   from organization/  Medication refills. Letter returned   Due to incorrect address, etc.

## 2024-04-24 DIAGNOSIS — K58.1 IRRITABLE BOWEL SYNDROME WITH CONSTIPATION: ICD-10-CM

## 2024-04-24 RX ORDER — PLECANATIDE 3 MG/1
3 TABLET ORAL DAILY
Qty: 30 TABLET | Refills: 5 | Status: SHIPPED | OUTPATIENT
Start: 2024-04-24

## 2024-04-24 NOTE — TELEPHONE ENCOUNTER
----- Message from Danielle Arevalo sent at 4/24/2024  2:14 PM CDT -----  Regarding: refill  [facility] [address] [facility] [address] called to refill plecanatide (TRULANCE) 3 mg Tab.  [phone]

## 2024-05-06 ENCOUNTER — ANESTHESIA EVENT (OUTPATIENT)
Dept: SURGERY | Facility: HOSPITAL | Age: 39
End: 2024-05-06
Payer: COMMERCIAL

## 2024-05-06 ENCOUNTER — ANESTHESIA (OUTPATIENT)
Dept: SURGERY | Facility: HOSPITAL | Age: 39
End: 2024-05-06
Payer: COMMERCIAL

## 2024-05-06 ENCOUNTER — HOSPITAL ENCOUNTER (OUTPATIENT)
Facility: HOSPITAL | Age: 39
Discharge: HOME | End: 2024-05-06
Attending: ANESTHESIOLOGY | Admitting: ANESTHESIOLOGY
Payer: COMMERCIAL

## 2024-05-06 DIAGNOSIS — G89.29 CHRONIC NECK PAIN: ICD-10-CM

## 2024-05-06 DIAGNOSIS — M54.2 CHRONIC NECK PAIN: ICD-10-CM

## 2024-05-06 PROCEDURE — 25000003 PHARM REV CODE 250: Performed by: NURSE ANESTHETIST, CERTIFIED REGISTERED

## 2024-05-06 PROCEDURE — 63600175 PHARM REV CODE 636 W HCPCS: Performed by: ANESTHESIOLOGY

## 2024-05-06 PROCEDURE — 63600175 PHARM REV CODE 636 W HCPCS: Performed by: NURSE ANESTHETIST, CERTIFIED REGISTERED

## 2024-05-06 PROCEDURE — 25000003 PHARM REV CODE 250: Performed by: ANESTHESIOLOGY

## 2024-05-06 PROCEDURE — 62321 NJX INTERLAMINAR CRV/THRC: CPT | Performed by: ANESTHESIOLOGY

## 2024-05-06 PROCEDURE — D9220A PRA ANESTHESIA: Mod: ,,, | Performed by: NURSE ANESTHETIST, CERTIFIED REGISTERED

## 2024-05-06 PROCEDURE — A4216 STERILE WATER/SALINE, 10 ML: HCPCS | Performed by: ANESTHESIOLOGY

## 2024-05-06 PROCEDURE — 37000008 HC ANESTHESIA 1ST 15 MINUTES: Performed by: ANESTHESIOLOGY

## 2024-05-06 PROCEDURE — 62321 NJX INTERLAMINAR CRV/THRC: CPT | Mod: ,,, | Performed by: ANESTHESIOLOGY

## 2024-05-06 RX ORDER — LIDOCAINE HYDROCHLORIDE 10 MG/ML
INJECTION, SOLUTION EPIDURAL; INFILTRATION; INTRACAUDAL; PERINEURAL
Status: DISCONTINUED | OUTPATIENT
Start: 2024-05-06 | End: 2024-05-06 | Stop reason: HOSPADM

## 2024-05-06 RX ORDER — DEXAMETHASONE SODIUM PHOSPHATE 10 MG/ML
INJECTION INTRAMUSCULAR; INTRAVENOUS
Status: DISCONTINUED | OUTPATIENT
Start: 2024-05-06 | End: 2024-05-06 | Stop reason: HOSPADM

## 2024-05-06 RX ORDER — LIDOCAINE HYDROCHLORIDE 10 MG/ML
INJECTION INFILTRATION; PERINEURAL
Status: DISCONTINUED
Start: 2024-05-06 | End: 2024-05-06 | Stop reason: HOSPADM

## 2024-05-06 RX ORDER — SODIUM CHLORIDE 9 MG/ML
INJECTION, SOLUTION INTRAVENOUS CONTINUOUS
Status: DISCONTINUED | OUTPATIENT
Start: 2024-05-06 | End: 2024-05-06

## 2024-05-06 RX ORDER — SODIUM CHLORIDE, SODIUM LACTATE, POTASSIUM CHLORIDE, CALCIUM CHLORIDE 600; 310; 30; 20 MG/100ML; MG/100ML; MG/100ML; MG/100ML
INJECTION, SOLUTION INTRAVENOUS CONTINUOUS
Status: DISCONTINUED | OUTPATIENT
Start: 2024-05-06 | End: 2024-05-06

## 2024-05-06 RX ORDER — MEPERIDINE HYDROCHLORIDE 25 MG/ML
12.5 INJECTION INTRAMUSCULAR; INTRAVENOUS; SUBCUTANEOUS EVERY 10 MIN PRN
Status: CANCELLED | OUTPATIENT
Start: 2024-05-06 | End: 2024-05-07

## 2024-05-06 RX ORDER — DEXAMETHASONE SODIUM PHOSPHATE 10 MG/ML
INJECTION INTRAMUSCULAR; INTRAVENOUS
Status: DISCONTINUED
Start: 2024-05-06 | End: 2024-05-06 | Stop reason: HOSPADM

## 2024-05-06 RX ORDER — PROCHLORPERAZINE EDISYLATE 5 MG/ML
5 INJECTION INTRAMUSCULAR; INTRAVENOUS EVERY 30 MIN PRN
Status: CANCELLED | OUTPATIENT
Start: 2024-05-06

## 2024-05-06 RX ORDER — ONDANSETRON HYDROCHLORIDE 2 MG/ML
4 INJECTION, SOLUTION INTRAVENOUS DAILY PRN
Status: CANCELLED | OUTPATIENT
Start: 2024-05-06

## 2024-05-06 RX ORDER — LIDOCAINE HYDROCHLORIDE 10 MG/ML
1 INJECTION, SOLUTION EPIDURAL; INFILTRATION; INTRACAUDAL; PERINEURAL ONCE
Status: CANCELLED | OUTPATIENT
Start: 2024-05-06 | End: 2024-05-06

## 2024-05-06 RX ORDER — LIDOCAINE HYDROCHLORIDE 10 MG/ML
1 INJECTION, SOLUTION EPIDURAL; INFILTRATION; INTRACAUDAL; PERINEURAL ONCE AS NEEDED
Status: DISCONTINUED | OUTPATIENT
Start: 2024-05-06 | End: 2024-05-06

## 2024-05-06 RX ORDER — PROPOFOL 10 MG/ML
VIAL (ML) INTRAVENOUS
Status: DISCONTINUED | OUTPATIENT
Start: 2024-05-06 | End: 2024-05-06

## 2024-05-06 RX ORDER — LIDOCAINE HYDROCHLORIDE 10 MG/ML
INJECTION, SOLUTION EPIDURAL; INFILTRATION; INTRACAUDAL; PERINEURAL
Status: DISCONTINUED | OUTPATIENT
Start: 2024-05-06 | End: 2024-05-06

## 2024-05-06 RX ORDER — ONDANSETRON 4 MG/1
8 TABLET, ORALLY DISINTEGRATING ORAL EVERY 6 HOURS PRN
Status: CANCELLED | OUTPATIENT
Start: 2024-05-06

## 2024-05-06 RX ORDER — MIDAZOLAM HYDROCHLORIDE 2 MG/2ML
2 INJECTION, SOLUTION INTRAMUSCULAR; INTRAVENOUS ONCE AS NEEDED
Status: CANCELLED | OUTPATIENT
Start: 2024-05-06 | End: 2035-10-03

## 2024-05-06 RX ORDER — SODIUM CHLORIDE 0.9 % (FLUSH) 0.9 %
SYRINGE (ML) INJECTION
Status: DISCONTINUED | OUTPATIENT
Start: 2024-05-06 | End: 2024-05-06 | Stop reason: HOSPADM

## 2024-05-06 RX ORDER — IPRATROPIUM BROMIDE AND ALBUTEROL SULFATE 2.5; .5 MG/3ML; MG/3ML
3 SOLUTION RESPIRATORY (INHALATION)
Status: CANCELLED | OUTPATIENT
Start: 2024-05-06

## 2024-05-06 RX ADMIN — LIDOCAINE HYDROCHLORIDE 20 MG: 10 INJECTION, SOLUTION EPIDURAL; INFILTRATION; INTRACAUDAL; PERINEURAL at 11:05

## 2024-05-06 RX ADMIN — SODIUM CHLORIDE, POTASSIUM CHLORIDE, SODIUM LACTATE AND CALCIUM CHLORIDE: 600; 310; 30; 20 INJECTION, SOLUTION INTRAVENOUS at 11:05

## 2024-05-06 RX ADMIN — PROPOFOL 100 MG: 10 INJECTION, EMULSION INTRAVENOUS at 11:05

## 2024-05-06 RX ADMIN — SODIUM CHLORIDE, POTASSIUM CHLORIDE, SODIUM LACTATE AND CALCIUM CHLORIDE: 600; 310; 30; 20 INJECTION, SOLUTION INTRAVENOUS at 10:05

## 2024-05-06 NOTE — TRANSFER OF CARE
Anesthesia Transfer of Care Note    Patient: Padmini La    Procedure(s) Performed: Procedure(s) (LRB):  INJECTION, STEROID, SPINE, CERVICAL, EPIDURAL C7-T1 (N/A)    Patient location: PACU    Anesthesia Type: MAC    Transport from OR: Transported from OR on room air with adequate spontaneous ventilation    Post pain: adequate analgesia    Post assessment: no apparent anesthetic complications    Post vital signs: stable    Level of consciousness: awake    Nausea/Vomiting: no nausea/vomiting    Complications: none    Transfer of care protocol was followed

## 2024-05-06 NOTE — H&P
Subjective  Patient ID: Padmini La is a 38 y.o. female.     Chief Complaint: Low-back Pain (Postop Valente TFESI L5 3/4/24 C/O pain level 2, states procedure helped some, Taking pain meds PRN.)     Referred by: No ref. provider found      She complains of neck pain when she has to look down for awhile.  She has pain that radiates down the bilateral upper extremities and stopped proximal to the elbows.  She has increased pain with constant motion of her arms such as painting, sanding, and scrubbing.  She had x-rays of her cervical spine which revealed some degenerative changes.     Low-back Pain     Back Pain     Neck Pain      This is a 38-year-old female who returns to clinic today for follow up of her chronic neck pain and low back pain.  PT order was originally placed to evaluate and treat L5 + S1 annular fissure and lumbar disc bulge and DDD for 2-3 times a week for 6-8 weeks to include ionophoresis along with manual PT + exercises. Patient reports physical therapy helped a little with strengthening however her pain is still present just not as intense.  She also reports more strength to her legs.  Her pain score has remained the same since last office visit of a 2/10.      Pain started In May when she pivoted wrong while playing ball with her kids and had severe pain with bilateral hips.  Subsequently she was unable to walk temporarily during that event.  She was able to ambulate shortly after.  She has also seen a massage therapist for this pain that was not successful.     Pain is not as intense as it was prior as pain was described as a burning knife type sensation that is also achy in will spasm at times.  Her pain occurs daily and is increased with prolonged standing chores and playing with the kids.  She notices her pain is reduced when she goes to the gym and does resistance training and lunges as this will decrease her pain.  Additionally she reports it feels like this resets everything.  She has  "intermittent groin pain left greater than right.  She also has bilateral hip pain left greater than right and bilateral buttock pain with intermittent radiation down bilateral legs on the lateral side that stops at the posterior knee.(L5) Pain is no longer radiating  to top of both thighs (L4) or intermittent radiation to bilateral inner thighs. Sometimes the locations of her pain changes down the leg.  Pain score today is 2/10.  Pain will reduce when she works with a massage therapist and is lying down.     She underwent bilateral L5 transforaminal epidural steroid injections a couple weeks ago.  She thinks that she has gotten a little bit of relief from that.  Her main complaint today is her neck pain.     Vital signs:        Vitals:     03/19/24 0840 03/19/24 0843   BP: 119/82     Pulse: 77     Weight: 64 kg (141 lb)     Height: 5' 2" (1.575 m)     PainSc:     2      Body mass index is 25.79 kg/m².  Pain Disability Index (PDI): 32     Interventional Pain History  None     Review of Systems   Musculoskeletal:  Positive for back pain and neck pain.   :  Low back and leg pain     FINDINGS:  There are 5 non-rib-bearing lumbar type vertebral bodies.  Lumbar spine alignment is anatomic without spondylolisthesis or pars interarticularis fracture.  The T12 and lumbar vertebral bodies are normal in morphology and there is no acute or chronic fracture.  There is a hemangioma at the superior L1 vertebral body with no additional osseous lesion or marrow signal abnormality.  The visualized distal thoracic spinal cord and conus are normal with the conus terminating at the upper L1 vertebral body level.  No paravertebral soft tissue abnormality is identified.  Additional changes of the spine on a level by level basis are as follows:     T12-L1: Normal on sagittal imaging.     L1-L2 through L3-L4: No significant abnormality or stenosis.     L4-L5: Minimal bilateral degenerative facet change with no additional abnormality and no " stenosis.     L5-S1: Intervertebral disc desiccation and mild-to-moderate disc space narrowing.  Shallow broad-based central disc protrusion measuring 4 x 13 mm in AP and TV extent and effacing the ventral epidural fat with no significant impingement upon the ventral thecal sac.  A posterior peripheral annular fissure is suggested.  There is also minimal bilateral degenerative facet change.  No spinal canal or lateral recess stenosis and no neural foramen stenosis.  The visualized sacrum and sacroiliac joints are normal.     Impression:     Degenerative disc disease at L5-S1 with shallow broad-based central disc protrusion and posterior annular fissure suggested.     Minimal bilateral degenerative facet change at L4-S1.     No spinal canal, lateral recess, or neural foramen stenosis.              Objective:      Objective  Physical Exam  General: Well developed; normal weight.  A&O x 3; No anxiety/depression; NAD  Mental Status: Oriented to person, palce and time. Displays appropriate mood & affect.  Head: Norm cephalic and atraumatic  Neck:  No cervical paraspinal banding.  Full range of motion with lateral turning and cervical flexion +extension.  Eyes: normal conjunctiva, normal lids, normal pupils  ENT and mouth: normal external ear, nose, and no lesions noted on the lips.  Respiratory: Symmetrical, Unlabored. No dyspnea  CV: normal rhythm and rate. No peripheral edema.   Abdomen: Non-distended     Extremities:  Gen: No cyanosis or tenderness to palpation bilateral upper and lower extremities  Skin: Warm, pink, dry, no rashes, no lesions on the lumbar spine  Strength: 5/5 motor strength bilateral upper and lower extremities  ROM: Full ROM in bilateral knees and ankles without pain or instability.     Neuro:  Gait: no altalgic lean, normal toe and heel raise. Independent ambulator.  DTR's: 2+ in bilateral patellar, and ankle  Sensory: Intact to light touch bilateral  upper and lower extremities     Spine: Normal  lordosis. No scoliosis  L-spine ROM: Full ROM to flexion, extension, bilateral rotation,   Straight Leg Raise:  Positive bilaterally.  SI Joint: No tenderness to palpation bilaterally.           Assessment:      Assessment       Encounter Diagnoses   Name Primary?    Lumbar degenerative disc disease Yes    Chronic back pain greater than 3 months duration      Cervicalgia      Cervical disc displacement                 Plan:      Bennett Washington was seen today for low-back pain.     Diagnoses and all orders for this visit:     Lumbar degenerative disc disease     Chronic back pain greater than 3 months duration     Cervicalgia     Cervical disc displacement              She is being scheduled for a cervical epidural steroid injection today to help with her chronic neck pain radiating into the upper extremities, consistent with findings of degenerative disc disease and disc herniation seen on her MRI.  The plan was discussed with the patient and she wishes to proceed.

## 2024-05-06 NOTE — DISCHARGE SUMMARY
[facility] Surgical - Periop Services  Discharge Note  Short Stay    Procedure(s) (LRB):  INJECTION, STEROID, SPINE, CERVICAL, EPIDURAL C7-T1 (N/A)      OUTCOME: Patient tolerated treatment/procedure well without complication and is now ready for discharge.    DISPOSITION: Home or Self Care    FINAL DIAGNOSIS:  <principal problem not specified>    FOLLOWUP: In clinic    DISCHARGE INSTRUCTIONS:  No discharge procedures on file.     TIME SPENT ON DISCHARGE: 5 minutes

## 2024-05-06 NOTE — ANESTHESIA PREPROCEDURE EVALUATION
"                                                                                                             05/06/2024  Padmini La is a 38 y.o., female (small mouth with TMJ) presents as an outpatient for cervical epidural steroid injection.  Clear liquids at 9:15 a.m.    Last 3 sets of Vitals        3/19/2024     8:43 AM 4/24/2024    12:57 PM 5/6/2024    10:13 AM   Vitals - 1 value per visit   SYSTOLIC   132   DIASTOLIC   86   Pulse   62   Temp   36.9 °C (98.5 °F)   SPO2   99 %   Weight (lb)  135    Weight (kg)  61.236    Height  5' 2" (1.575 m)    BMI (Calculated)  24.7    Pain Score Two       Pre-op Assessment    I have reviewed the Patient Summary Reports.    I have reviewed the NPO Status.   I have reviewed the Medications.     Review of Systems  Anesthesia Hx:               Denies Personal Hx of Anesthesia complications.                    Social:  Non-Smoker       Cardiovascular:   Functional Capacity good / => 4 METS                         Renal/:   renal calculi                   Physical Exam  General: Well nourished, Cooperative, Alert and Oriented    Airway:  Mallampati: III   Mouth Opening: Small, but > 3cm  TM Distance: Normal  Tongue: Normal  Neck ROM: Normal ROM    Dental:  Intact    Chest/Lungs:  Clear to auscultation, Normal Respiratory Rate    Heart:  Rate: Normal  Rhythm: Regular Rhythm        Anesthesia Plan  Type of Anesthesia, risks & benefits discussed:    Anesthesia Type: Gen Natural Airway  Intra-op Monitoring Plan: Standard ASA Monitors  Induction:  IV  Informed Consent: Informed consent signed with the Patient and all parties understand the risks and agree with anesthesia plan.  All questions answered.   ASA Score: 2  Day of Surgery Review of History & Physical: H&P Update referred to the surgeon/provider.  Anesthesia Plan Notes: Proceed after 11:15 a.m.    Ready For Surgery From Anesthesia Perspective.     .      "

## 2024-05-06 NOTE — ANESTHESIA POSTPROCEDURE EVALUATION
Anesthesia Post Evaluation    Patient: Padmini La    Procedure(s) Performed: Procedure(s) (LRB):  INJECTION, STEROID, SPINE, CERVICAL, EPIDURAL C7-T1 (N/A)    Final Anesthesia Type: MAC      Patient location during evaluation: PACU  Patient participation: Yes- Able to Participate  Level of consciousness: awake and alert  Post-procedure vital signs: reviewed and stable  Pain management: adequate  Airway patency: patent    PONV status at discharge: No PONV  Anesthetic complications: no      Cardiovascular status: blood pressure returned to baseline and stable  Respiratory status: unassisted  Hydration status: euvolemic  Follow-up not needed.                    No case tracking events are documented in the log.      Pain/Keanu Score: No data recorded

## 2024-05-07 VITALS
BODY MASS INDEX: 25.84 KG/M2 | DIASTOLIC BLOOD PRESSURE: 82 MMHG | RESPIRATION RATE: 18 BRPM | TEMPERATURE: 98 F | SYSTOLIC BLOOD PRESSURE: 129 MMHG | WEIGHT: 140.44 LBS | HEART RATE: 79 BPM | OXYGEN SATURATION: 97 % | HEIGHT: 62 IN

## 2024-05-28 ENCOUNTER — TELEPHONE (OUTPATIENT)
Dept: NEUROSURGERY | Facility: CLINIC | Age: 39
End: 2024-05-28
Payer: COMMERCIAL

## 2024-05-28 ENCOUNTER — OFFICE VISIT (OUTPATIENT)
Dept: PAIN MEDICINE | Facility: CLINIC | Age: 39
End: 2024-05-28
Payer: COMMERCIAL

## 2024-05-28 VITALS
DIASTOLIC BLOOD PRESSURE: 87 MMHG | BODY MASS INDEX: 25.76 KG/M2 | HEART RATE: 66 BPM | HEIGHT: 62 IN | SYSTOLIC BLOOD PRESSURE: 123 MMHG | WEIGHT: 140 LBS

## 2024-05-28 DIAGNOSIS — M54.2 CERVICALGIA: ICD-10-CM

## 2024-05-28 DIAGNOSIS — M50.20 CERVICAL DISC DISPLACEMENT: ICD-10-CM

## 2024-05-28 DIAGNOSIS — M50.30 DDD (DEGENERATIVE DISC DISEASE), CERVICAL: Primary | ICD-10-CM

## 2024-05-28 PROCEDURE — 99213 OFFICE O/P EST LOW 20 MIN: CPT | Mod: ,,, | Performed by: ANESTHESIOLOGY

## 2024-05-28 PROCEDURE — 3079F DIAST BP 80-89 MM HG: CPT | Mod: CPTII,,, | Performed by: ANESTHESIOLOGY

## 2024-05-28 PROCEDURE — 3074F SYST BP LT 130 MM HG: CPT | Mod: CPTII,,, | Performed by: ANESTHESIOLOGY

## 2024-05-28 PROCEDURE — 3008F BODY MASS INDEX DOCD: CPT | Mod: CPTII,,, | Performed by: ANESTHESIOLOGY

## 2024-05-28 PROCEDURE — 1159F MED LIST DOCD IN RCRD: CPT | Mod: CPTII,,, | Performed by: ANESTHESIOLOGY

## 2024-05-28 PROCEDURE — 1160F RVW MEDS BY RX/DR IN RCRD: CPT | Mod: CPTII,,, | Performed by: ANESTHESIOLOGY

## 2024-05-28 RX ORDER — PREGABALIN 25 MG/1
25 CAPSULE ORAL DAILY
Qty: 30 CAPSULE | Refills: 2 | Status: SHIPPED | OUTPATIENT
Start: 2024-05-28

## 2024-05-28 NOTE — PROGRESS NOTES
Subjective:      Patient ID: Padmini La is a 38 y.o. female.    Chief Complaint: Post-op Evaluation (Post op KEVAN C7-T1 5/6/24 C/O pain level 7, having tightness in neck putting pressure on head causing headaches, started yesterday while cutting the grass, states procedure didn't help, Taking high dosage of advil states only medication helping the pain right now.)    Referred by: No ref. provider found     She complains of neck pain when she has to look down for awhile.  She has pain that radiates down the bilateral upper extremities and stopped proximal to the elbows.  She has increased pain with constant motion of her arms such as painting, sanding, and scrubbing.  She underwent a cervical epidural steroid injection a couple weeks ago, which states she really did not get much relief from it.      Low-back Pain    Back Pain    Neck Pain     This is a 38-year-old female who returns to clinic today for follow up of her chronic neck pain and low back pain.  PT order was originally placed to evaluate and treat L5 + S1 annular fissure and lumbar disc bulge and DDD for 2-3 times a week for 6-8 weeks to include ionophoresis along with manual PT + exercises. Patient reports physical therapy helped a little with strengthening however her pain is still present just not as intense.  She also reports more strength to her legs.  Her pain score has remained the same since last office visit of a 2/10.     Pain started In May when she pivoted wrong while playing ball with her kids and had severe pain with bilateral hips.  Subsequently she was unable to walk temporarily during that event.  She was able to ambulate shortly after.  She has also seen a massage therapist for this pain that was not successful.     Pain is not as intense as it was prior as pain was described as a burning knife type sensation that is also achy in will spasm at times.  Her pain occurs daily and is increased with prolonged standing chores and playing with  "the kids.  She notices her pain is reduced when she goes to the gym and does resistance training and lunges as this will decrease her pain.  Additionally she reports it feels like this resets everything.  She has intermittent groin pain left greater than right.  She also has bilateral hip pain left greater than right and bilateral buttock pain with intermittent radiation down bilateral legs on the lateral side that stops at the posterior knee.(L5) Pain is no longer radiating  to top of both thighs (L4) or intermittent radiation to bilateral inner thighs. Sometimes the locations of her pain changes down the leg.  Pain score today is 2/10.  Pain will reduce when she works with a massage therapist and is lying down.    She underwent bilateral L5 transforaminal epidural steroid injections a couple weeks ago.  She thinks that she has gotten a little bit of relief from that.  Her main complaint today is her neck pain.    Vital signs:   Vitals:    05/28/24 1048 05/28/24 1052   BP: 123/87    Pulse: 66    Weight: 63.5 kg (140 lb)    Height: 5' 2" (1.575 m)    PainSc:    7     Body mass index is 25.61 kg/m².  Pain Disability Index (PDI): 34       Interventional Pain History  None    Review of Systems   Musculoskeletal:  Positive for back pain and neck pain.   :  Low back and leg pain    FINDINGS:  There are 5 non-rib-bearing lumbar type vertebral bodies.  Lumbar spine alignment is anatomic without spondylolisthesis or pars interarticularis fracture.  The T12 and lumbar vertebral bodies are normal in morphology and there is no acute or chronic fracture.  There is a hemangioma at the superior L1 vertebral body with no additional osseous lesion or marrow signal abnormality.  The visualized distal thoracic spinal cord and conus are normal with the conus terminating at the upper L1 vertebral body level.  No paravertebral soft tissue abnormality is identified.  Additional changes of the spine on a level by level basis are as " follows:     T12-L1: Normal on sagittal imaging.     L1-L2 through L3-L4: No significant abnormality or stenosis.     L4-L5: Minimal bilateral degenerative facet change with no additional abnormality and no stenosis.     L5-S1: Intervertebral disc desiccation and mild-to-moderate disc space narrowing.  Shallow broad-based central disc protrusion measuring 4 x 13 mm in AP and TV extent and effacing the ventral epidural fat with no significant impingement upon the ventral thecal sac.  A posterior peripheral annular fissure is suggested.  There is also minimal bilateral degenerative facet change.  No spinal canal or lateral recess stenosis and no neural foramen stenosis.  The visualized sacrum and sacroiliac joints are normal.     Impression:     Degenerative disc disease at L5-S1 with shallow broad-based central disc protrusion and posterior annular fissure suggested.     Minimal bilateral degenerative facet change at L4-S1.     No spinal canal, lateral recess, or neural foramen stenosis.           Objective:      Physical Exam  General: Well developed; normal weight.  A&O x 3; No anxiety/depression; NAD  Mental Status: Oriented to person, palce and time. Displays appropriate mood & affect.  Head: Norm cephalic and atraumatic  Neck:  No cervical paraspinal banding.  Full range of motion with lateral turning and cervical flexion +extension.  Eyes: normal conjunctiva, normal lids, normal pupils  ENT and mouth: normal external ear, nose, and no lesions noted on the lips.  Respiratory: Symmetrical, Unlabored. No dyspnea  CV: normal rhythm and rate. No peripheral edema.   Abdomen: Non-distended     Extremities:  Gen: No cyanosis or tenderness to palpation bilateral upper and lower extremities  Skin: Warm, pink, dry, no rashes, no lesions on the lumbar spine  Strength: 5/5 motor strength bilateral upper and lower extremities  ROM: Full ROM in bilateral knees and ankles without pain or instability.     Neuro:  Gait: no  altalgic lean, normal toe and heel raise. Independent ambulator.  DTR's: 2+ in bilateral patellar, and ankle  Sensory: Intact to light touch bilateral  upper and lower extremities     Spine: Normal lordosis. No scoliosis  L-spine ROM: Full ROM to flexion, extension, bilateral rotation,   Straight Leg Raise:  Positive bilaterally.  SI Joint: No tenderness to palpation bilaterally.         Assessment:      Encounter Diagnoses   Name Primary?    Cervical disc displacement Yes    Cervicalgia            Plan:       Padmini was seen today for post-op evaluation.    Diagnoses and all orders for this visit:    Cervical disc displacement  -     pregabalin (LYRICA) 25 MG capsule; Take 1 capsule (25 mg total) by mouth once daily.    Cervicalgia  -     pregabalin (LYRICA) 25 MG capsule; Take 1 capsule (25 mg total) by mouth once daily.          I am refilling her Lyrica, since the previous physician who was prescribing it has relocated.  We discussed having her go back to see Dr. Mccullough for neurosurgical options, but she would like to try 1 more injection 1st.  We will get that scheduled for her today.

## 2024-05-28 NOTE — TELEPHONE ENCOUNTER
I would recommend she reach out to Dr. Mederos regarding additional recommendations at this time. If she were to fail conservative measures per Dr. Mederos's guidance I would recommend she follow-up in our clinic at that time.

## 2024-05-29 NOTE — TELEPHONE ENCOUNTER
I am still trying to import the images into Epic but Aimee was able to review the report. I spoke with the patient to advise her of Aimee's recommendations. She verbalized understanding and will just move forward with her 2nd injection with Dr. Mederos in August. I advised her to give me a call if she does change her mind in the meantime.

## 2024-05-29 NOTE — TELEPHONE ENCOUNTER
She has 2 bulging discs in her neck that could be causing some pain. If she has tried physical therapy for her neck and cervical injections with no relief I would recommend she come in to talk to Dr. Mccullough about surgical options. Thanks

## 2024-05-29 NOTE — TELEPHONE ENCOUNTER
I spoke with the patient to advise her of Aimee's recommendations. She stated she already spoke with Dr. Mederos regarding this whenever she seen her yesterday. Dr. Mederos suggested she reach out to her surgeon. The patient is wanting Dr. Mccullough or you to review her recent MRI C completed on 3/4/2024 that was performed at [facility] to see if there is any underlying conditions. I am having a hard time processing the images from [facility]. I will keep trying so I can send to Aimee to review.

## 2024-07-30 ENCOUNTER — TELEPHONE (OUTPATIENT)
Dept: GASTROENTEROLOGY | Facility: CLINIC | Age: 39
End: 2024-07-30
Payer: COMMERCIAL

## 2024-08-08 ENCOUNTER — OFFICE VISIT (OUTPATIENT)
Facility: CLINIC | Age: 39
End: 2024-08-08
Payer: COMMERCIAL

## 2024-08-08 VITALS
BODY MASS INDEX: 25.76 KG/M2 | WEIGHT: 140 LBS | HEART RATE: 70 BPM | DIASTOLIC BLOOD PRESSURE: 86 MMHG | HEIGHT: 62 IN | SYSTOLIC BLOOD PRESSURE: 121 MMHG

## 2024-08-08 DIAGNOSIS — M54.12 CERVICAL RADICULITIS: ICD-10-CM

## 2024-08-08 DIAGNOSIS — M50.20 CERVICAL DISC DISPLACEMENT: ICD-10-CM

## 2024-08-08 DIAGNOSIS — M50.30 DDD (DEGENERATIVE DISC DISEASE), CERVICAL: ICD-10-CM

## 2024-08-08 DIAGNOSIS — M54.2 CERVICALGIA: Primary | ICD-10-CM

## 2024-08-26 ENCOUNTER — OFFICE VISIT (OUTPATIENT)
Dept: GASTROENTEROLOGY | Facility: CLINIC | Age: 39
End: 2024-08-26
Payer: COMMERCIAL

## 2024-08-26 VITALS
HEIGHT: 62 IN | BODY MASS INDEX: 25.4 KG/M2 | WEIGHT: 138 LBS | HEART RATE: 71 BPM | DIASTOLIC BLOOD PRESSURE: 85 MMHG | TEMPERATURE: 99 F | OXYGEN SATURATION: 97 % | SYSTOLIC BLOOD PRESSURE: 119 MMHG

## 2024-08-26 DIAGNOSIS — K58.1 IRRITABLE BOWEL SYNDROME WITH CONSTIPATION: Primary | ICD-10-CM

## 2024-08-26 PROCEDURE — 99215 OFFICE O/P EST HI 40 MIN: CPT | Mod: PBBFAC | Performed by: NURSE PRACTITIONER

## 2024-08-26 PROCEDURE — 99214 OFFICE O/P EST MOD 30 MIN: CPT | Mod: S$PBB,,, | Performed by: NURSE PRACTITIONER

## 2024-08-26 PROCEDURE — 3074F SYST BP LT 130 MM HG: CPT | Mod: CPTII,,, | Performed by: NURSE PRACTITIONER

## 2024-08-26 PROCEDURE — 1160F RVW MEDS BY RX/DR IN RCRD: CPT | Mod: CPTII,,, | Performed by: NURSE PRACTITIONER

## 2024-08-26 PROCEDURE — 3079F DIAST BP 80-89 MM HG: CPT | Mod: CPTII,,, | Performed by: NURSE PRACTITIONER

## 2024-08-26 PROCEDURE — 1159F MED LIST DOCD IN RCRD: CPT | Mod: CPTII,,, | Performed by: NURSE PRACTITIONER

## 2024-08-26 PROCEDURE — 3008F BODY MASS INDEX DOCD: CPT | Mod: CPTII,,, | Performed by: NURSE PRACTITIONER

## 2024-08-26 RX ORDER — PLECANATIDE 3 MG/1
3 TABLET ORAL DAILY
Qty: 30 TABLET | Refills: 11 | Status: SHIPPED | OUTPATIENT
Start: 2024-08-26

## 2024-08-26 RX ORDER — TIRZEPATIDE 2.5 MG/.5ML
2.5 INJECTION, SOLUTION SUBCUTANEOUS
COMMUNITY

## 2024-08-26 NOTE — ASSESSMENT & PLAN NOTE
Lifestyle and dietary modifications provided  Continue daily probiotic as directed  Continue Trulance 3 mg daily  Call with updates  Follow-up clinic visit with NP in 1 year

## 2024-08-26 NOTE — PROGRESS NOTES
Subjective:       Patient ID: Padmini La is a 39 y.o. female.    Chief Complaint: Irritable bowel syndrome with constipation    This 39-year-old  female with fibromyalgia, migraine headaches, and chronic constipation presents unaccompanied for a follow-up visit.  She was initially seen in the clinic July 11, 2019, referred for IBS at the time.  She reported a long-standing history of chronic constipation that seemed to have worsened over the past few weeks.  She would typically have 1-2 bowel movements per week.  She reported straining and sitting on the toilet for long periods of time.  She had noted gas and bloating when she went more than 2 to 3 days without a bowel movement and with intake of gluten and dairy products.  She also reported intermittent generalized abdominal pain that was relieved with defecation majority of the time.  She had tried OTC MiraLAX, milk of magnesia, and senna with minimal relief noted.  She reported being tried on Linzess 72 mcg daily for approximately 1 year with moderate relief noted.  She was taking a daily probiotic.  Her appetite was good.  She reported difficulty losing weight over the past several months despite dietary and lifestyle modifications.  She denied nocturnal symptoms, fever, chills, nausea, vomiting, odynophagia, dysphagia, acid reflux, heartburn, belching, or early satiety.  She denied melena, hematochezia, fecal urgency, fecal incontinence, or pain with defecation.     She was recommended lifestyle and dietary modifications, as well as increasing fluid and fiber intake.  I advised that she continue her daily probiotic.  She was prescribed hyoscyamine 0.125 mg every 8 hours as needed for abdominal pain, Linzess 145 mcg daily, and simethicone 250 mg twice daily as needed for gas.  Laboratory results July 17, 2019 revealed unremarkable CMP, ferritin, hemoglobin A1c, vitamin D, TSH, and CBC.  Abdominal x-ray July 17, 2019 revealed findings consistent with  constipation.  She was recommended an initial colon cleanse with 1-2 bottles of magnesium citrate followed by maintenance with previously recommended regimen.     She was seen in the clinic for a follow-up visit October 16, 2019.  She reported intermittent acid reflux and was taking OTC Tums approximately once weekly with moderate relief noted.  She had occasional bloating and belching.  She continued to take a daily probiotic and simethicone as needed with moderate relief noted.  Her appetite was good and her weight was stable.  She denied nocturnal symptoms, fever, chills, nausea, vomiting, odynophagia, dysphagia, belching, or early satiety.  She had intermittent lower abdominal cramps that were typically relieved with defecation and Levsin as needed.  She reported having to take Levsin approximately once monthly.  Her bowel regimen was consistent of a daily probiotic, Linzess 145 mcg daily, and senna.  She would typically have one bowel movement daily, but reported not feeling completely emptied at times.  She denied melena, hematochezia, fecal urgency, fecal incontinence, or pain with defecation.  She had decreased intake of dairy and bread.     She was recommended to increase Linzess to 290 mcg daily.  She was provided lifestyle and dietary modifications and encouraged to continue Levsin as needed and daily probiotic.  EGD was ordered, which she completed with Dr. Daley December 30, 2019 and resulted normal.     When seen in January 2020, she continued to take her daily probiotic, digestive enzymes, and Linzess 290 mcg daily.  Symptoms improved on her current medication regimen.  She typically had 1-2 bowel movements per day without melena, hematochezia, fecal urgency, fecal incontinence, or pain with defecation.  She would occasionally have bloating and gas.  Her appetite was good and her weight was stable.  She denied fever, chills, nausea, vomiting, odynophagia, dysphagia, acid reflux, heartburn,  belching, or early satiety.  She was recommended lifestyle and dietary modifications and advised to continue her medication regimen as directed.     When evaluated in June 2020, she continued to take Linzess 290 mcg daily, as well as a daily probiotic.  She also started taking senna again 2 days prior.  She reported decreased effectiveness of Linzess over the past 1-2 months and not feeling completely evacuated with defecation.  She would occasionally strain to have a bowel movement.  She had intermittent lower abdominal cramping that was relieved with defecation.  She had occasional bloating and gas.  Her appetite was good and her weight was stable.  She denied nocturnal symptoms, fever, chills, nausea, vomiting, odynophagia, dysphagia, acid reflux, heartburn, belching, or early satiety.  She denied melena, hematochezia, fecal urgency, fecal incontinence, or pain with defecation.  Laboratory results May 21, 2020 revealed GFR 87, total protein 8.4, globulin 4.50, and otherwise unremarkable CMP, free T4, TSH, CBC, CRP, and ESR.  She was advised to continue her current treatment regimen as directed     She underwent colonoscopy with Dr. Daley November 25, 2020 with findings of nonbleeding internal hemorrhoids and otherwise normal exam with a recommended recall of 10 years.     She was seen for a follow-up visit May 3, 2021.  She reported 1 bowel movement every 1 to 2 days as long as she was consistent with her whole 30 diet.  She did not always feel completely evacuated.  She avoided dairy and poultry.  She had occasional abdominal spasms and cramping that was relieved with Levsin as needed.  She had to take 2 Levsin tablets yesterday.  Her appetite was good and her weight was stable.  She denied nocturnal symptoms, fever, chills, nausea, vomiting, odynophagia, dysphagia, acid reflux, heartburn, early satiety, melena, hematochezia, fecal urgency, fecal incontinence, or pain with defecation.  She was advised to  start Motegrity 2 mg daily.     When evaluated via telemedicine visit September 3, 2021, she reported feeling well. She remained consistent on her whole 30 diet and avoided dairy and poultry. She had not had to take Levsin. She was drinking probiotic drinks and taking Trulance for constipation with good relief noted. She denied any problems at this time.     She was seen for a follow-up visit August 25, 2022. She reported minimal change in symptoms from previous telemedicine visit.     She was seen for a follow-up visit August 25, 2023.  She reported feeling well aside from continued constipation over time.  She typically had 1 formed to harder stool every 2-3 days if she did not take something and dependent on what type of foods she ate.  She did not always feel completely evacuated.  She had occasional bloating, cramping, and gas.  She was taking a daily probiotic and doing a cleanse for her colon with some relief noted.  She denied melena, hematochezia, fecal urgency, fecal incontinence, or pain with defecation.  Her appetite was good and her weight was stable.  She denied fever, chills, nausea, vomiting, hematemesis, odynophagia, dysphagia, acid reflux, pyrosis, early satiety, or abdominal pain.      Today, she presents for a follow-up visit.  She continues taking Trulance 3 mg daily with good relief noted and denies any problems or change in symptoms from previous office visit.     She was evaluated by Dr. Montgomery several years ago and treated for IBS.  She also reports being tested for celiac disease in the past, which resulted negative.  She denies ever having a colonoscopy done.  She takes ibuprofen as needed approximately once weekly for headaches and denies use of blood thinners.  She denies tobacco or alcohol use.  Her brother and uncle have a history of Crohn's disease.      Review of patient's allergies indicates:   Allergen Reactions    Lactase      Other reaction(s): Inflammation , bloating , cramps     Sulfabenzamide      Cutaneous eruption    Sulfacetamide      Cutaneous eruption    Sulfamethoxazole-trimethoprim      Other reaction(s): Abnormal vaginal bleeding    Sulfathiazole      Cutaneous eruption    Egg      Other reaction(s): Abdominal bloating    Sulfa (sulfonamide antibiotics) Rash     Past Medical History:   Diagnosis Date    Allergy     Annular tear of lumbar disc     Chronic migraine without aura     Fibromyalgia     IBS (irritable bowel syndrome)     Kidney stone     Lumbar radiculopathy      Past Surgical History:   Procedure Laterality Date    COLONOSCOPY  11/25/2020    Dr Elaine Daley    EPIDURAL STEROID INJECTION INTO CERVICAL SPINE N/A 5/6/2024    Procedure: INJECTION, STEROID, SPINE, CERVICAL, EPIDURAL C7-T1;  Surgeon: Jayla Mederos MD;  Location: [facility] OR;  Service: Pain Management;  Laterality: N/A;    LITHOTRIPSY      si joint injection Bilateral     TRANSFORAMINAL EPIDURAL INJECTION OF STEROID Bilateral 11/20/2023    Procedure: INJECTION, STEROID, EPIDURAL, TRANSFORAMINAL APPROACH Bilateral L5 No Sedation  Local only;  Surgeon: Jayla Mederos MD;  Location: [facility] OR;  Service: Pain Management;  Laterality: Bilateral;  Bilateral TFESI L5 NO SEDATION    TRANSFORAMINAL EPIDURAL INJECTION OF STEROID Bilateral 3/4/2024    Procedure: INJECTION, STEROID, EPIDURAL, TRANSFORAMINAL APPROACH  Bilateral L5   * patient does not wants sedation;  Surgeon: Jayla Mederos MD;  Location: [facility] OR;  Service: Pain Management;  Laterality: Bilateral;  Bilateral TFESI L5    UPPER GASTROINTESTINAL ENDOSCOPY       Family History:   family history includes Crohn's disease in her brother; Heart disease in her father and mother; Hypertension in her father, mother, and sister.    Social History:    reports that she has never smoked. She has never been exposed to tobacco smoke. She has never used smokeless tobacco. She reports that she does not currently use alcohol. She reports that she does not use  drugs.    Review of Systems  Negative except as noted in the HPI.      Objective:      Physical Exam  Constitutional:       Appearance: Normal appearance.   HENT:      Head: Normocephalic.      Mouth/Throat:      Mouth: Mucous membranes are moist.   Eyes:      Extraocular Movements: Extraocular movements intact.      Conjunctiva/sclera: Conjunctivae normal.      Pupils: Pupils are equal, round, and reactive to light.   Cardiovascular:      Rate and Rhythm: Normal rate and regular rhythm.      Pulses: Normal pulses.      Heart sounds: Normal heart sounds.   Pulmonary:      Effort: Pulmonary effort is normal.      Breath sounds: Normal breath sounds.   Abdominal:      General: Bowel sounds are normal.      Palpations: Abdomen is soft.   Musculoskeletal:         General: Normal range of motion.      Cervical back: Normal range of motion and neck supple.   Skin:     General: Skin is warm and dry.   Neurological:      General: No focal deficit present.      Mental Status: She is alert and oriented to person, place, and time.   Psychiatric:         Mood and Affect: Mood normal.         Behavior: Behavior normal.         Thought Content: Thought content normal.         Judgment: Judgment normal.         Home Medications:     Current Outpatient Medications   Medication Sig    ALPRAZolam (XANAX) 0.5 MG tablet Take 1 tablet (0.5 mg total) by mouth as needed for Anxiety.    azelastine (ASTELIN) 137 mcg (0.1 %) nasal spray 2 sprays by Nasal route 2 (two) times daily.    baclofen (LIORESAL) 10 MG tablet Take 1 tablet (10 mg total) by mouth every evening.    desvenlafaxine succinate (PRISTIQ) 50 MG Tb24 Take 1 tablet (50 mg total) by mouth once daily.    fluticasone propionate (FLONASE) 50 mcg/actuation nasal spray 2 sprays by Each Nostril route 2 (two) times daily.    INV TESTOSTERONE/ANASTROZOL OR PLACEBO PELLETS Inject 1 Pellet into the skin every 3 (three) months. FOR INVESTIGATIONAL USE ONLY    ipratropium (ATROVENT) 21 mcg  (0.03 %) nasal spray SMARTSI Spray(s) Both Nares 3 Times Daily PRN    loratadine-pseudoephedrine 5-120 mg (CLARITIN-D 12-HOUR) 5-120 mg per tablet Claritin-D 12 Hour Take No date recorded No form recorded No frequency recorded No route recorded No set duration recorded No set duration amount recorded active No dosage strength recorded No dosage strength units of measure recorded    magnesium aspartate HCl 61 mg (615 mg) TbEC Take by mouth once.    montelukast (SINGULAIR) 10 mg tablet Take 10 mg by mouth once daily.    multivitamin capsule Take 1 capsule by mouth every morning.    naltrexone (DEPADE) 50 mg tablet Take 100 mg by mouth every morning. Taking 100mg one day then 50 mg the next   She is alternating between 100 mg and 50 mg very other day    ondansetron (ZOFRAN-ODT) 4 MG TbDL Zofran ODT Take No date recorded No form recorded No frequency recorded No route recorded No set duration recorded No set duration amount recorded suspended No dosage strength recorded No dosage strength units of measure recorded    pregabalin (LYRICA) 25 MG capsule Take 1 capsule (25 mg total) by mouth once daily.    tirzepatide (MOUNJARO) 2.5 mg/0.5 mL PnIj Inject 2.5 mg into the skin every 7 days.    tramadol-acetaminophen 37.5-325 mg (ULTRACET) 37.5-325 mg Tab Take 1 tablet by mouth every 6 (six) hours as needed for Pain.    traZODone (DESYREL) 50 MG tablet Take 1 tablet (50 mg total) by mouth nightly.    ZOLMitriptan (ZOMIG) 5 MG tablet Take 1 tablet (5 mg total) by mouth as needed for Migraine (1 pill when the migraine starts, may repeat in 2 h if needed. max 2 in 24 hours.).    ACETAMINOPHEN-CAFFEINE ORAL Take 1 tablet by mouth as needed. (Patient not taking: Reported on 2024)    plecanatide (TRULANCE) 3 mg Tab Take 3 mg by mouth once daily.    turmeric root extract 500 mg Tab Take by mouth. (Patient not taking: Reported on 2024)     No current facility-administered medications for this visit.     Assessment/Plan:      Problem List Items Addressed This Visit          GI    Irritable bowel syndrome with constipation - Primary     Lifestyle and dietary modifications provided  Continue daily probiotic as directed  Continue Trulance 3 mg daily  Call with updates  Follow-up clinic visit with NP in 1 year         Relevant Medications    plecanatide (TRULANCE) 3 mg Tab

## 2024-09-12 ENCOUNTER — ANESTHESIA (OUTPATIENT)
Dept: SURGERY | Facility: HOSPITAL | Age: 39
End: 2024-09-12
Payer: COMMERCIAL

## 2024-09-12 ENCOUNTER — HOSPITAL ENCOUNTER (OUTPATIENT)
Facility: HOSPITAL | Age: 39
Discharge: HOME | End: 2024-09-12
Attending: ANESTHESIOLOGY | Admitting: ANESTHESIOLOGY
Payer: COMMERCIAL

## 2024-09-12 ENCOUNTER — ANESTHESIA EVENT (OUTPATIENT)
Dept: SURGERY | Facility: HOSPITAL | Age: 39
End: 2024-09-12
Payer: COMMERCIAL

## 2024-09-12 DIAGNOSIS — G89.29 CHRONIC NECK PAIN: ICD-10-CM

## 2024-09-12 DIAGNOSIS — M54.2 CHRONIC NECK PAIN: ICD-10-CM

## 2024-09-12 LAB
B-HCG UR QL: NEGATIVE
CTP QC/QA: YES

## 2024-09-12 PROCEDURE — 62321 NJX INTERLAMINAR CRV/THRC: CPT | Mod: ,,, | Performed by: ANESTHESIOLOGY

## 2024-09-12 PROCEDURE — 37000008 HC ANESTHESIA 1ST 15 MINUTES: Performed by: ANESTHESIOLOGY

## 2024-09-12 PROCEDURE — 62321 NJX INTERLAMINAR CRV/THRC: CPT | Performed by: ANESTHESIOLOGY

## 2024-09-12 PROCEDURE — 63600175 PHARM REV CODE 636 W HCPCS: Performed by: NURSE ANESTHETIST, CERTIFIED REGISTERED

## 2024-09-12 PROCEDURE — A4216 STERILE WATER/SALINE, 10 ML: HCPCS | Performed by: ANESTHESIOLOGY

## 2024-09-12 PROCEDURE — 63600175 PHARM REV CODE 636 W HCPCS: Performed by: ANESTHESIOLOGY

## 2024-09-12 PROCEDURE — 25000003 PHARM REV CODE 250: Performed by: NURSE ANESTHETIST, CERTIFIED REGISTERED

## 2024-09-12 PROCEDURE — 81025 URINE PREGNANCY TEST: CPT | Performed by: ANESTHESIOLOGY

## 2024-09-12 PROCEDURE — 25000003 PHARM REV CODE 250: Performed by: ANESTHESIOLOGY

## 2024-09-12 RX ORDER — LIDOCAINE HYDROCHLORIDE 10 MG/ML
INJECTION, SOLUTION EPIDURAL; INFILTRATION; INTRACAUDAL; PERINEURAL
Status: DISCONTINUED | OUTPATIENT
Start: 2024-09-12 | End: 2024-09-12 | Stop reason: HOSPADM

## 2024-09-12 RX ORDER — LIDOCAINE HYDROCHLORIDE 10 MG/ML
INJECTION, SOLUTION EPIDURAL; INFILTRATION; INTRACAUDAL; PERINEURAL
Status: DISCONTINUED | OUTPATIENT
Start: 2024-09-12 | End: 2024-09-12

## 2024-09-12 RX ORDER — PROPOFOL 10 MG/ML
VIAL (ML) INTRAVENOUS
Status: DISCONTINUED | OUTPATIENT
Start: 2024-09-12 | End: 2024-09-12

## 2024-09-12 RX ORDER — DEXAMETHASONE SODIUM PHOSPHATE 10 MG/ML
INJECTION INTRAMUSCULAR; INTRAVENOUS
Status: DISCONTINUED
Start: 2024-09-12 | End: 2024-09-12 | Stop reason: HOSPADM

## 2024-09-12 RX ORDER — LIDOCAINE HYDROCHLORIDE 10 MG/ML
INJECTION, SOLUTION EPIDURAL; INFILTRATION; INTRACAUDAL; PERINEURAL
Status: DISCONTINUED
Start: 2024-09-12 | End: 2024-09-12 | Stop reason: HOSPADM

## 2024-09-12 RX ORDER — SODIUM CHLORIDE 0.9 % (FLUSH) 0.9 %
SYRINGE (ML) INJECTION
Status: DISCONTINUED | OUTPATIENT
Start: 2024-09-12 | End: 2024-09-12 | Stop reason: HOSPADM

## 2024-09-12 RX ORDER — DEXAMETHASONE SODIUM PHOSPHATE 10 MG/ML
INJECTION INTRAMUSCULAR; INTRAVENOUS
Status: DISCONTINUED | OUTPATIENT
Start: 2024-09-12 | End: 2024-09-12 | Stop reason: HOSPADM

## 2024-09-12 RX ADMIN — SODIUM CHLORIDE, POTASSIUM CHLORIDE, SODIUM LACTATE AND CALCIUM CHLORIDE: 600; 310; 30; 20 INJECTION, SOLUTION INTRAVENOUS at 10:09

## 2024-09-12 RX ADMIN — LIDOCAINE HYDROCHLORIDE 50 MG: 10 INJECTION, SOLUTION EPIDURAL; INFILTRATION; INTRACAUDAL; PERINEURAL at 10:09

## 2024-09-12 RX ADMIN — PROPOFOL 100 MG: 10 INJECTION, EMULSION INTRAVENOUS at 10:09

## 2024-09-12 NOTE — H&P
[facility]     Subjective:      Chief Complaint: Follow-up (Pt states received 85 percent relief from last injection received great relief C/O pain level 3, Taking Lyrica for pain seems to be helping.)     Referred by: No ref. provider found      History of Present Illness: Padmini La is a 39 y.o. female presents today for cervicalgia with cervical radiculopathy.  Prior to this visit patient received excellent pain relief to her neck and bilateral arms greater than 85%.  During that time she had a notable reduction in her chronic cervicogenic headache she was also able to drive longer distances and looks side-to-side without as much pain.  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  She has been able to see more restorative sleep and has started working with a strength training  once a week to maximize her functionality which is also helping.  However her pain has started to return and it is more intense with driving and teaching kids to drive as well as mopping and sweeping or anything twisting at the waist also exacerbates her neck and back pain.  These activities will elevate her pain score to a 10/10  Her headaches are also starting to return.  This is also starting to interrupt her sleep.  She would like to proceed with an interlaminar cervical epidural steroid injection C7-T1 that has been tentatively scheduled for September 12th if insurance approves.  Patient was also continuing to take the Lyrica that Dr. John escalona prescribed for her.  She plans on returning back to see neurosurgeon Dr. Cynthia Mccullough after her neck cervical epidural steroid injection if approved.     She has also noting that her back pain overall as manageable she has good days and bad days.  Her pain will elevate more with twisting and bending.  Physical therapy is still helping her with strengthening and PT exercises for her low back.  She has also figured out  "that her pain to the low back and legs will reduce when she lies down flat and uses 10s unit as well as ice.  Overall she is satisfied with the pain relief to her back and bilateral legs        Vital signs:   Visit Vitals  /86 (BP Location: Right arm, Patient Position: Sitting, BP Method: Medium (Automatic))   Pulse 70   Ht 5' 2" (1.575 m)   Wt 63.5 kg (140 lb)   BMI 25.61 kg/m²      Vitals       Vitals:     08/08/24 1104   PainSc:   3            Interventional Pain History  05/06/2024:  KEVAN C7-T1     ROS: Neck and upper extremity pain to arms      MRI cervical spine  Located in  on 03/05/2024  [facility]  Objective:      Objective  Physical Exam  General: Well developed; normal weight.  A&O x 3; No anxiety/depression; NAD  Mental Status: Oriented to person, palce and time. Displays appropriate mood & affect.  Head: Norm cephalic and atraumatic  Neck:  Bilateral cervical paraspinal banding.  Limited and painful range of motion with lateral turning and cervical flexion +extension.  Eyes: normal conjunctiva, normal lids, normal pupils  ENT and mouth: normal external ear, nose, and no lesions noted on the lips.  Respiratory: Symmetrical, Unlabored. No dyspnea  CV: normal rhythm and rate. No peripheral edema.   Abdomen: Non-distended     Extremities:  Gen: No cyanosis or tenderness to palpation bilateral upper and lower extremities  Skin: Warm, pink, dry, no rashes, no lesions on the lumbar spine  Strength: 5/5 motor strength bilateral upper and lower extremities  ROM: Full ROM in bilateral knees and ankles without pain or instability.     Neuro:  Gait: no altalgic lean, normal toe and heel raise. Independent ambulator.  DTR's: 2+ in bilateral patellar, and ankle  Sensory: Intact to light touch bilateral  upper and lower extremities     Spine: Normal lordosis. No scoliosis  L-spine ROM: Full ROM to flexion, extension, bilateral rotation,   Straight Leg Raise:  Positive bilaterally.  SI Joint: " No tenderness to palpation bilaterally.           Assessment:      Assessment  Padmini La is a 39 y.o. female presents today for cervicalgia with cervical radiculopathy.  Prior to this visit patient received excellent pain relief to her neck and bilateral arms greater than 85%.  During that time she had a notable reduction in her chronic cervicogenic headache she was also able to drive longer distances and looks side-to-side without as much pain.  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  She has been able to see more restorative sleep and has started working with a strength training  once a week to maximize her functionality which is also helping.  However her pain has started to return and it is more intense with driving and teaching kids to drive as well as mopping and sweeping or anything twisting at the waist also exacerbates her neck and back pain.  These activities will elevate her pain score to a 10/10  Her headaches are also starting to return.  This is also starting to interrupt her sleep.  She would like to proceed with an interlaminar cervical epidural steroid injection C7-T1 that has been tentatively scheduled for September 12th if insurance approves.  Patient was also continuing to take the Lyrica that Dr. John escalona prescribed for her.  She plans on returning back to see neurosurgeon Dr. Cynthia Mccullough after her neck cervical epidural steroid injection if approved.        Plan of Care;   Request KEVAN C7-T1   Hold Tumeric + Ibuprofen 7 days prior and resume post op.        Encounter Diagnoses   Name Primary?    Cervicalgia Yes    Cervical disc displacement      Cervical radiculitis              Plan:      Plan  Padmini was seen today for follow-up.     Diagnoses and all orders for this visit:     Cervicalgia     Cervical disc displacement     Cervical radiculitis                   Past Medical History:   Diagnosis Date    Allergy       Annular tear of lumbar disc      Chronic migraine without aura      Fibromyalgia      IBS (irritable bowel syndrome)      Kidney stone      Lumbar radiculopathy                 Past Surgical History:   Procedure Laterality Date    COLONOSCOPY   11/25/2020     Dr Elaine Daley    EPIDURAL STEROID INJECTION INTO CERVICAL SPINE N/A 5/6/2024     Procedure: INJECTION, STEROID, SPINE, CERVICAL, EPIDURAL C7-T1;  Surgeon: Jayla Mederos MD;  Location: [facility] OR;  Service: Pain Management;  Laterality: N/A;    LITHOTRIPSY        si joint injection Bilateral      TRANSFORAMINAL EPIDURAL INJECTION OF STEROID Bilateral 11/20/2023     Procedure: INJECTION, STEROID, EPIDURAL, TRANSFORAMINAL APPROACH Bilateral L5 No Sedation  Local only;  Surgeon: Jayla Mederos MD;  Location: [facility] OR;  Service: Pain Management;  Laterality: Bilateral;  Bilateral TFESI L5 NO SEDATION    TRANSFORAMINAL EPIDURAL INJECTION OF STEROID Bilateral 3/4/2024     Procedure: INJECTION, STEROID, EPIDURAL, TRANSFORAMINAL APPROACH  Bilateral L5   * patient does not wants sedation;  Surgeon: Jayla Mederos MD;  Location: [facility] OR;  Service: Pain Management;  Laterality: Bilateral;  Bilateral TFESI L5    UPPER GASTROINTESTINAL ENDOSCOPY                    Family History   Problem Relation Name Age of Onset    Heart disease Mother        Hypertension Mother        Hypertension Father        Heart disease Father        Hypertension Sister        Crohn's disease Brother             Social History            Socioeconomic History    Marital status:    Tobacco Use    Smoking status: Never       Passive exposure: Never    Smokeless tobacco: Never   Substance and Sexual Activity    Alcohol use: Not Currently       Comment: occasionally    Drug use: No    Sexual activity: Yes       Partners: Male         Current Medications          Current Outpatient Medications   Medication Sig Dispense Refill    ACETAMINOPHEN-CAFFEINE ORAL Take 1 tablet by mouth as  needed.        ALPRAZolam (XANAX) 0.5 MG tablet Take 1 tablet (0.5 mg total) by mouth as needed for Anxiety. 90 tablet 3    azelastine (ASTELIN) 137 mcg (0.1 %) nasal spray 2 sprays by Nasal route 2 (two) times daily.        baclofen (LIORESAL) 10 MG tablet Take 1 tablet (10 mg total) by mouth every evening. 90 tablet 3    desvenlafaxine succinate (PRISTIQ) 50 MG Tb24 Take 1 tablet (50 mg total) by mouth once daily. 90 tablet 3    fluticasone propionate (FLONASE) 50 mcg/actuation nasal spray 2 sprays by Each Nostril route 2 (two) times daily.        INV TESTOSTERONE/ANASTROZOL OR PLACEBO PELLETS Inject 1 Pellet into the skin every 3 (three) months. FOR INVESTIGATIONAL USE ONLY        ipratropium (ATROVENT) 21 mcg (0.03 %) nasal spray SMARTSI Spray(s) Both Nares 3 Times Daily PRN        loratadine-pseudoephedrine 5-120 mg (CLARITIN-D 12-HOUR) 5-120 mg per tablet Claritin-D 12 Hour Take No date recorded No form recorded No frequency recorded No route recorded No set duration recorded No set duration amount recorded active No dosage strength recorded No dosage strength units of measure recorded        magnesium aspartate HCl 61 mg (615 mg) TbEC Take by mouth once.        montelukast (SINGULAIR) 10 mg tablet Take 10 mg by mouth once daily.        multivitamin capsule Take 1 capsule by mouth every morning.        naltrexone (DEPADE) 50 mg tablet Take 100 mg by mouth every morning. Taking 100mg one day then 50 mg the next   She is alternating between 100 mg and 50 mg very other day        ondansetron (ZOFRAN-ODT) 4 MG TbDL Zofran ODT Take No date recorded No form recorded No frequency recorded No route recorded No set duration recorded No set duration amount recorded suspended No dosage strength recorded No dosage strength units of measure recorded        plecanatide (TRULANCE) 3 mg Tab Take 3 mg by mouth once daily. 30 tablet 5    pregabalin (LYRICA) 25 MG capsule Take 1 capsule (25 mg total) by mouth once daily. 30  capsule 2    tramadol-acetaminophen 37.5-325 mg (ULTRACET) 37.5-325 mg Tab Take 1 tablet by mouth every 6 (six) hours as needed for Pain. 100 tablet 5    traZODone (DESYREL) 50 MG tablet Take 1 tablet (50 mg total) by mouth nightly. 90 tablet 3    turmeric root extract 500 mg Tab Take by mouth.        ZOLMitriptan (ZOMIG) 5 MG tablet Take 1 tablet (5 mg total) by mouth as needed for Migraine (1 pill when the migraine starts, may repeat in 2 h if needed. max 2 in 24 hours.). 30 tablet 5      No current facility-administered medications for this visit.                  Review of patient's allergies indicates:   Allergen Reactions    Lactase         Other reaction(s): Inflammation , bloating , cramps    Sulfabenzamide         Cutaneous eruption    Sulfacetamide         Cutaneous eruption    Sulfamethoxazole-trimethoprim         Other reaction(s): Abnormal vaginal bleeding    Sulfathiazole         Cutaneous eruption    Egg         Other reaction(s): Abdominal bloating    Sulfa (sulfonamide antibiotics) Rash

## 2024-09-12 NOTE — PLAN OF CARE
Pt. Resting comfortably, VSS, no s/s distress, Keanu at acceptable level for discharge. Discharge instructions given verbally and written handout printed and given to pt. And spouse as well. Both stated understanding. Pt. Discharged from facility per wheelchair with spouse driving her home.

## 2024-09-12 NOTE — ANESTHESIA PREPROCEDURE EVALUATION
09/12/2024  Padmini La is a 39 y.o., female.      Pre-op Assessment    I have reviewed the Patient Summary Reports.    I have reviewed the NPO Status.   I have reviewed the Medications.     Review of Systems  Anesthesia Hx:               Denies Personal Hx of Anesthesia complications.                    Social:  Non-Smoker       Cardiovascular:   Functional Capacity good / => 4 METS                         Renal/:   renal calculi                   Physical Exam  General: Well nourished, Cooperative, Alert and Oriented    Airway:  Mallampati: III   Mouth Opening: Small, but > 3cm  TM Distance: Normal  Tongue: Normal  Neck ROM: Normal ROM    Dental:  Intact        Anesthesia Plan  Type of Anesthesia, risks & benefits discussed:    Anesthesia Type: Gen Natural Airway  Intra-op Monitoring Plan: Standard ASA Monitors  Induction:  IV  Informed Consent: Informed consent signed with the Patient and all parties understand the risks and agree with anesthesia plan.  All questions answered.   ASA Score: 2  Day of Surgery Review of History & Physical: H&P Update referred to the surgeon/provider.    Ready For Surgery From Anesthesia Perspective.     .

## 2024-09-12 NOTE — DISCHARGE SUMMARY
[facility] Surgical - Periop Services  Discharge Note  Short Stay    Procedure(s) (LRB):  INJECTION, STEROID, SPINE, CERVICAL, EPIDURAL  ///C7-T1 (N/A)      OUTCOME: Patient tolerated treatment/procedure well without complication and is now ready for discharge.    DISPOSITION: Home or Self Care    FINAL DIAGNOSIS:  <principal problem not specified>    FOLLOWUP: In clinic    DISCHARGE INSTRUCTIONS:  No discharge procedures on file.     TIME SPENT ON DISCHARGE: 5 minutes

## 2024-09-12 NOTE — ANESTHESIA POSTPROCEDURE EVALUATION
Anesthesia Post Evaluation    Patient: Padmini La    Procedure(s) Performed: Procedure(s) (LRB):  INJECTION, STEROID, SPINE, CERVICAL, EPIDURAL  ///C7-T1 (N/A)    Final Anesthesia Type: MAC      Patient location during evaluation: OPS  Patient participation: Yes- Able to Participate  Level of consciousness: awake and alert and oriented  Post-procedure vital signs: reviewed and stable  Pain management: adequate  Airway patency: patent    PONV status at discharge: No PONV  Anesthetic complications: no      Cardiovascular status: blood pressure returned to baseline and stable  Respiratory status: unassisted, spontaneous ventilation and room air  Hydration status: euvolemic  Follow-up not needed.  Comments: Patient to bed per self              Vitals Value Taken Time   /71 09/12/24 1035   Temp 36.7 °C (98.1 °F) 09/12/24 1035   Pulse 68 09/12/24 1035   Resp 14 09/12/24 1035   SpO2 98 % 09/12/24 1035         No case tracking events are documented in the log.      Pain/Keanu Score: No data recorded

## 2024-09-12 NOTE — TRANSFER OF CARE
"Anesthesia Transfer of Care Note    Patient: Padmini La    Procedure(s) Performed: Procedure(s) (LRB):  INJECTION, STEROID, SPINE, CERVICAL, EPIDURAL  ///C7-T1 (N/A)    Patient location: OPS    Anesthesia Type: MAC    Transport from OR: Transported from OR on room air with adequate spontaneous ventilation    Post pain: adequate analgesia    Post assessment: no apparent anesthetic complications    Post vital signs: stable    Level of consciousness: awake, alert and oriented    Nausea/Vomiting: no nausea/vomiting    Complications: none    Transfer of care protocol was followed      Last vitals: Visit Vitals  /71   Pulse 68   Temp 36.7 °C (98.1 °F) (Oral)   Resp 14   Ht 5' 2" (1.575 m)   Wt 63.8 kg (140 lb 10.5 oz)   LMP 09/01/2024 (Approximate)   SpO2 98%   Breastfeeding No   BMI 25.73 kg/m²     "

## 2024-09-13 VITALS
SYSTOLIC BLOOD PRESSURE: 109 MMHG | BODY MASS INDEX: 25.88 KG/M2 | HEART RATE: 65 BPM | DIASTOLIC BLOOD PRESSURE: 76 MMHG | TEMPERATURE: 98 F | HEIGHT: 62 IN | OXYGEN SATURATION: 99 % | RESPIRATION RATE: 16 BRPM | WEIGHT: 140.63 LBS

## 2024-09-26 ENCOUNTER — OFFICE VISIT (OUTPATIENT)
Facility: CLINIC | Age: 39
End: 2024-09-26
Payer: COMMERCIAL

## 2024-09-26 VITALS
HEART RATE: 67 BPM | TEMPERATURE: 98 F | SYSTOLIC BLOOD PRESSURE: 112 MMHG | BODY MASS INDEX: 25.88 KG/M2 | HEIGHT: 62 IN | WEIGHT: 140.63 LBS | DIASTOLIC BLOOD PRESSURE: 83 MMHG

## 2024-09-26 DIAGNOSIS — M51.36 DDD (DEGENERATIVE DISC DISEASE), LUMBAR: ICD-10-CM

## 2024-09-26 DIAGNOSIS — M54.2 CERVICALGIA: ICD-10-CM

## 2024-09-26 DIAGNOSIS — M54.12 CERVICAL RADICULITIS: ICD-10-CM

## 2024-09-26 DIAGNOSIS — M50.20 CERVICAL DISC DISPLACEMENT: Primary | ICD-10-CM

## 2024-09-26 DIAGNOSIS — M54.16 LUMBAR RADICULOPATHY: ICD-10-CM

## 2024-09-26 DIAGNOSIS — M48.061 LUMBAR FORAMINAL STENOSIS: ICD-10-CM

## 2024-09-26 NOTE — PROGRESS NOTES
[facility]    Subjective:     Chief Complaint: Neck Pain (Post-op KEVAN C7-T1 9/12/24, pt states she received great relief, pain level has decreased and headaches have become less, OTC and RX medication for relief, pain level 2/10)    Referred by: No ref. provider found     History of Present Illness: Padmini La is a 39 y.o. female presents today as a postop follow-up for pain associated with cervical degenerative disc disease and radiculopathy after receiving a cervical epidural steroid injection C7-T1 on 09/12/2024.  Overall she reports about 60-70% reduction of pain relief to her neck.  Initially she reported some pressure from the epidural steroid injection however she has not been having headaches as frequently and not having to do the therapeutic cupping as much.  Overall she is much more satisfied.  Her pain will flare up when she looks down or pulls her arms up in a pattern like you would use when your painting or side-to-side motions will also aggravate her neck pain.  These activities will elevate her pain score to a 10/10 on the NRS.  She takes Lyrica 25 mg daily and 800 mg of ibuprofen for neck as well as back and bilateral leg pain that travels in a posterior pattern to the foot.  She plans to follow-up with neurosurgeon Dr. Cynthia Mccullough and see what the next steps are for her neck pain.    Her back pain is returning since her last epidural steroid injection to bilateral L5 several months ago.  Her pain is located to bilateral low back, bilateral buttocks, bilateral hips and will radiate down to her posterior legs.  Bending rotating her hip, exercising and mopping will exacerbate this pain especially if it has any kind of rotation involved in it.  These activities will elevate her pain score to a 10/10 on the NRS.  Her pain will reduce minimally by lying flat and cupping as well as using Tylenol and ibuprofen.  It was too soon to repeat this lumbar epidural steroid injection as she just  "received a cervical epidural steroid injection.          Vital signs:   Visit Vitals  /83 (BP Location: Left arm, Patient Position: Sitting, BP Method: Large (Automatic))   Pulse 67   Temp 98.2 °F (36.8 °C) (Oral)   Ht 5' 2" (1.575 m)   Wt 63.8 kg (140 lb 10.5 oz)   LMP 09/01/2024 (Approximate)   BMI 25.73 kg/m²      Vitals:    09/26/24 1114   PainSc:   2     Pain Disability Index (PDI): 28       Interventional Pain History  09/12/2024:  KEVAN C7-T1    ROS neck and upper extremity pain to arms.  + back and leg pain      Objective:        Physical Exam  General: Well developed; normal weight.  A&O x 3; No anxiety/depression; NAD  Mental Status: Oriented to person, palce and time. Displays appropriate mood & affect.  Head: Norm cephalic and atraumatic  Neck:  Bilateral cervical paraspinal banding.  Limited  range of motion with lateral turning and cervical flexion +extension.  Equal handgrip bilaterally.  Able to fully extend arms above head.  Eyes: normal conjunctiva, normal lids, normal pupils  ENT and mouth: normal external ear, nose, and no lesions noted on the lips.  Respiratory: Symmetrical, Unlabored. No dyspnea  CV: normal rhythm and rate. No peripheral edema.   Abdomen: Non-distended     Extremities:  Gen: No cyanosis or tenderness to palpation bilateral upper and lower extremities  Skin: Warm, pink, dry, no rashes, no lesions on the lumbar spine  Strength: 5/5 motor strength bilateral upper and lower extremities  ROM: Full ROM in bilateral knees and ankles without pain or instability.     Neuro:  Gait: no altalgic lean, normal toe and heel raise. Independent ambulator.  DTR's: 2+ in bilateral patellar, and ankle  Sensory: Intact to light touch bilateral  upper and lower extremities     Spine: Normal lordosis. No scoliosis  L-spine ROM: Full ROM to flexion, extension, bilateral rotation,   Straight Leg Raise:  Positive bilaterally.  SI Joint: No tenderness to palpation bilaterally.           Assessment: "     Padmini La is a 39 y.o. female presents today as a postop follow-up for pain associated with cervical degenerative disc disease and radiculopathy after receiving a cervical epidural steroid injection C7-T1 on 09/12/2024.  Overall she reports about 60-70% reduction of pain relief to her neck.  Initially she reported some pressure from the epidural steroid injection however she has not been having headaches as frequently and not having to do the therapeutic cupping as much.  Overall she is much more satisfied.  Her pain will flare up when she looks down or pulls her arms up in a pattern like you would use when your painting or side-to-side motions will also aggravate her neck pain.  These activities will elevate her pain score to a 10/10 on the NRS.  She takes Lyrica 25 mg daily and 800 mg of ibuprofen for neck as well as back and bilateral leg pain that travels in a posterior pattern to the foot.  She plans to follow-up with neurosurgeon Dr. Cynthia Mccullough and see what the next steps are for her neck pain.    Her back pain is returning since her last epidural steroid injection to bilateral L5 several months ago.  Her pain is located to bilateral low back, bilateral buttocks, bilateral hips and will radiate down to her posterior legs.  Bending rotating her hip, exercising and mopping will exacerbate this pain especially if it has any kind of rotation involved in it.  These activities will elevate her pain score to a 10/10 on the NRS.  Her pain will reduce minimally by lying flat and cupping as well as using Tylenol and ibuprofen.  It was too soon to repeat this lumbar epidural steroid injection as she just received a cervical epidural steroid injection.    Plan of care:  Patient to follow-up in November for back and bilateral leg pain  Patient was also following up with neurosurgeon Dr. Cynthia Mccullough for her cervicalgia post the cervical epidural steroid injection.    Encounter Diagnoses   Name Primary?    Cervical  disc displacement Yes    Cervical radiculitis     Cervicalgia     Lumbar radiculopathy     Lumbar foraminal stenosis     DDD (degenerative disc disease), lumbar          Plan:       Padmini was seen today for neck pain.    Diagnoses and all orders for this visit:    Cervical disc displacement    Cervical radiculitis    Cervicalgia    Lumbar radiculopathy    Lumbar foraminal stenosis    DDD (degenerative disc disease), lumbar           Past Medical History:   Diagnosis Date    Allergy     Annular tear of lumbar disc     Chronic migraine without aura     Fibromyalgia     IBS (irritable bowel syndrome)     Kidney stone     Lumbar radiculopathy        Past Surgical History:   Procedure Laterality Date    COLONOSCOPY  11/25/2020    Dr Elaine Daley    EPIDURAL STEROID INJECTION INTO CERVICAL SPINE N/A 5/6/2024    Procedure: INJECTION, STEROID, SPINE, CERVICAL, EPIDURAL C7-T1;  Surgeon: Jayla Mederos MD;  Location: [facility] OR;  Service: Pain Management;  Laterality: N/A;    EPIDURAL STEROID INJECTION INTO CERVICAL SPINE N/A 9/12/2024    Procedure: INJECTION, STEROID, SPINE, CERVICAL, EPIDURAL  ///C7-T1;  Surgeon: Jayla Mederos MD;  Location: [facility] OR;  Service: Pain Management;  Laterality: N/A;  KEVAN C7-T1    LITHOTRIPSY      si joint injection Bilateral     TRANSFORAMINAL EPIDURAL INJECTION OF STEROID Bilateral 11/20/2023    Procedure: INJECTION, STEROID, EPIDURAL, TRANSFORAMINAL APPROACH Bilateral L5 No Sedation  Local only;  Surgeon: Jayla Mederos MD;  Location: [facility] OR;  Service: Pain Management;  Laterality: Bilateral;  Bilateral TFESI L5 NO SEDATION    TRANSFORAMINAL EPIDURAL INJECTION OF STEROID Bilateral 3/4/2024    Procedure: INJECTION, STEROID, EPIDURAL, TRANSFORAMINAL APPROACH  Bilateral L5   * patient does not wants sedation;  Surgeon: Jayla Mederos MD;  Location: [facility] OR;  Service: Pain Management;  Laterality: Bilateral;  Bilateral TFESI L5    UPPER GASTROINTESTINAL ENDOSCOPY         Family  History   Problem Relation Name Age of Onset    Heart disease Mother      Hypertension Mother      Hypertension Father      Heart disease Father      Hypertension Sister      Crohn's disease Brother         Social History     Socioeconomic History    Marital status:    Tobacco Use    Smoking status: Never     Passive exposure: Never    Smokeless tobacco: Never   Substance and Sexual Activity    Alcohol use: Yes     Comment: occasionally    Drug use: No    Sexual activity: Yes     Partners: Male       Current Outpatient Medications   Medication Sig Dispense Refill    ALPRAZolam (XANAX) 0.5 MG tablet Take 1 tablet (0.5 mg total) by mouth as needed for Anxiety. 90 tablet 3    azelastine (ASTELIN) 137 mcg (0.1 %) nasal spray 2 sprays by Nasal route 2 (two) times daily.      baclofen (LIORESAL) 10 MG tablet Take 1 tablet (10 mg total) by mouth every evening. 90 tablet 3    desvenlafaxine succinate (PRISTIQ) 50 MG Tb24 Take 1 tablet (50 mg total) by mouth once daily. 90 tablet 3    fluticasone propionate (FLONASE) 50 mcg/actuation nasal spray 2 sprays by Each Nostril route 2 (two) times daily.      INV TESTOSTERONE/ANASTROZOL OR PLACEBO PELLETS Inject 1 Pellet into the skin every 3 (three) months. FOR INVESTIGATIONAL USE ONLY      ipratropium (ATROVENT) 21 mcg (0.03 %) nasal spray SMARTSI Spray(s) Both Nares 3 Times Daily PRN      loratadine-pseudoephedrine 5-120 mg (CLARITIN-D 12-HOUR) 5-120 mg per tablet Claritin-D 12 Hour Take No date recorded No form recorded No frequency recorded No route recorded No set duration recorded No set duration amount recorded active No dosage strength recorded No dosage strength units of measure recorded      magnesium aspartate HCl 61 mg (615 mg) TbEC Take by mouth once.      montelukast (SINGULAIR) 10 mg tablet Take 10 mg by mouth once daily.      multivitamin capsule Take 1 capsule by mouth every morning.      naltrexone (DEPADE) 50 mg tablet Take 100 mg by mouth every  morning. Taking 100mg one day then 50 mg the next   She is alternating between 100 mg and 50 mg very other day      ondansetron (ZOFRAN-ODT) 4 MG TbDL Zofran ODT Take No date recorded No form recorded No frequency recorded No route recorded No set duration recorded No set duration amount recorded suspended No dosage strength recorded No dosage strength units of measure recorded      plecanatide (TRULANCE) 3 mg Tab Take 3 mg by mouth once daily. 30 tablet 11    pregabalin (LYRICA) 25 MG capsule Take 1 capsule (25 mg total) by mouth once daily. 30 capsule 2    semaglutide (OZEMPIC) 0.25 mg or 0.5 mg (2 mg/3 mL) pen injector Inject 0.5 mg into the skin every 7 days.      tirzepatide (MOUNJARO) 2.5 mg/0.5 mL PnIj Inject 2.5 mg into the skin every 7 days.      tramadol-acetaminophen 37.5-325 mg (ULTRACET) 37.5-325 mg Tab Take 1 tablet by mouth every 6 (six) hours as needed for Pain. 100 tablet 5    traZODone (DESYREL) 50 MG tablet Take 1 tablet (50 mg total) by mouth nightly. 90 tablet 3    turmeric root extract 500 mg Tab Take by mouth.      ZOLMitriptan (ZOMIG) 5 MG tablet Take 1 tablet (5 mg total) by mouth as needed for Migraine (1 pill when the migraine starts, may repeat in 2 h if needed. max 2 in 24 hours.). 30 tablet 5    ACETAMINOPHEN-CAFFEINE ORAL Take 1 tablet by mouth as needed. (Patient not taking: Reported on 8/26/2024)       No current facility-administered medications for this visit.       Review of patient's allergies indicates:   Allergen Reactions    Lactase      Other reaction(s): Inflammation , bloating , cramps    Sulfabenzamide      Cutaneous eruption    Sulfacetamide      Cutaneous eruption    Sulfamethoxazole-trimethoprim      Other reaction(s): Abnormal vaginal bleeding    Sulfathiazole      Cutaneous eruption    Egg      Other reaction(s): Abdominal bloating    Sulfa (sulfonamide antibiotics) Rash

## 2024-09-27 ENCOUNTER — TELEPHONE (OUTPATIENT)
Dept: NEUROSURGERY | Facility: CLINIC | Age: 39
End: 2024-09-27
Payer: COMMERCIAL

## 2024-09-27 NOTE — TELEPHONE ENCOUNTER
It appears the patient saw Lazara Osman NP yesterday and reported significant relief (60-70%) of her cervical symptoms and headaches following a recent C7/T1 KEVAN.  I would recommend she continue on with conservative measures per the recommendations of Dr. Mederos and Lazara given she reported these modalities have improved her symptoms.  Should she fail to achieve manageable relief with conservative measures then I would recommend she follow up with Dr. Mccullough with updated MRI of the cervical spine to discuss surgical options.  She can be seen by an CASSY prior to seeing Dr. Mccullough to obtain updated imaging.  She also was reporting lower back pain and lower extremity pain with only temporary relief following injections with Dr. Mederos.  If she was wishing to discuss her lumbar symptoms with Dr. Mccullough she will need updated imaging of this region as well. Thanks

## 2024-09-27 NOTE — TELEPHONE ENCOUNTER
"I spoke with the patient to advise her of Aimee's recommendations. She stated she is only getting temporary relief from the VERONICA, not a "full" relief. She stated all the injection does is take the edge off, her pain never really goes way. She is still limited in activities that she does due to the neck pain. She does still have HA's but does state they are not as bad as before. She is not scheduled to have another cervical VERONICA but will be having a lumbar VERONICA. The patient stated she has already spoken to Dr. Mccullough in regards to her back pain and Dr. Mccullough advised her that she was not a surgical candidate so her back pain is on the back burner for now. She is requesting to come in to see an CASSY to get other options in regards to her neck pain. I scheduled her to see Aimee on 9/30/24 at 2:00 in order to get an updated MRI prior to scheduling her with Dr. Mccullough. She was compliant w date and time.  "

## 2024-10-28 ENCOUNTER — OFFICE VISIT (OUTPATIENT)
Dept: NEUROSURGERY | Facility: CLINIC | Age: 39
End: 2024-10-28
Payer: COMMERCIAL

## 2024-10-28 VITALS
HEART RATE: 66 BPM | WEIGHT: 138 LBS | RESPIRATION RATE: 16 BRPM | HEIGHT: 62 IN | SYSTOLIC BLOOD PRESSURE: 113 MMHG | DIASTOLIC BLOOD PRESSURE: 77 MMHG | BODY MASS INDEX: 25.4 KG/M2

## 2024-10-28 DIAGNOSIS — G89.29 CHRONIC NECK PAIN: ICD-10-CM

## 2024-10-28 DIAGNOSIS — M54.2 CHRONIC NECK PAIN: ICD-10-CM

## 2024-10-28 DIAGNOSIS — M50.20 CERVICAL DISC DISPLACEMENT: Primary | ICD-10-CM

## 2024-10-28 PROCEDURE — 3008F BODY MASS INDEX DOCD: CPT | Mod: CPTII,,, | Performed by: NURSE PRACTITIONER

## 2024-10-28 PROCEDURE — 1159F MED LIST DOCD IN RCRD: CPT | Mod: CPTII,,, | Performed by: NURSE PRACTITIONER

## 2024-10-28 PROCEDURE — 3078F DIAST BP <80 MM HG: CPT | Mod: CPTII,,, | Performed by: NURSE PRACTITIONER

## 2024-10-28 PROCEDURE — 1160F RVW MEDS BY RX/DR IN RCRD: CPT | Mod: CPTII,,, | Performed by: NURSE PRACTITIONER

## 2024-10-28 PROCEDURE — 3074F SYST BP LT 130 MM HG: CPT | Mod: CPTII,,, | Performed by: NURSE PRACTITIONER

## 2024-10-28 PROCEDURE — 99214 OFFICE O/P EST MOD 30 MIN: CPT | Mod: ,,, | Performed by: NURSE PRACTITIONER

## 2024-10-28 RX ORDER — LUBIPROSTONE 24 UG/1
CAPSULE ORAL
COMMUNITY
End: 2024-10-28

## 2024-10-28 RX ORDER — TOPIRAMATE 100 MG/1
TABLET, FILM COATED ORAL
COMMUNITY
End: 2024-10-28

## 2024-10-28 RX ORDER — FEXOFENADINE HCL 60 MG/1
TABLET, FILM COATED ORAL
COMMUNITY

## 2024-10-28 RX ORDER — MILNACIPRAN HYDROCHLORIDE 25 MG/1
TABLET, FILM COATED ORAL
COMMUNITY
End: 2024-10-28

## 2024-10-28 RX ORDER — IBUPROFEN 600 MG/1
TABLET ORAL
COMMUNITY

## 2024-11-15 DIAGNOSIS — K58.1 IRRITABLE BOWEL SYNDROME WITH CONSTIPATION: ICD-10-CM

## 2024-11-15 RX ORDER — LUBIPROSTONE 8 UG/1
8 CAPSULE ORAL 2 TIMES DAILY
Qty: 60 CAPSULE | Refills: 5 | Status: SHIPPED | OUTPATIENT
Start: 2024-11-15

## 2024-11-15 RX ORDER — PLECANATIDE 3 MG/1
3 TABLET ORAL DAILY
Qty: 30 TABLET | Refills: 11 | OUTPATIENT
Start: 2024-11-15

## 2024-11-15 NOTE — TELEPHONE ENCOUNTER
Received refill request for Trulance. Spoke with pharmacy. Advised new Rx was sent in Aug. 2024. Pharmacy located Rx.  Spoke with pt. Advised Insurance denied Trulance PA. Discussed Good Rx coupon $25. Informed pt [employer] requires failure of Linzess & Amitiza. Pt would like to try Amitiza. Please advise.

## 2024-11-19 ENCOUNTER — OFFICE VISIT (OUTPATIENT)
Dept: NEUROSURGERY | Facility: CLINIC | Age: 39
End: 2024-11-19
Payer: COMMERCIAL

## 2024-11-19 VITALS
WEIGHT: 136.81 LBS | SYSTOLIC BLOOD PRESSURE: 115 MMHG | BODY MASS INDEX: 25.18 KG/M2 | DIASTOLIC BLOOD PRESSURE: 79 MMHG | HEIGHT: 62 IN | HEART RATE: 76 BPM | RESPIRATION RATE: 16 BRPM

## 2024-11-19 DIAGNOSIS — M54.42 CHRONIC BILATERAL LOW BACK PAIN WITH BILATERAL SCIATICA: ICD-10-CM

## 2024-11-19 DIAGNOSIS — M50.90 CERVICAL DISC DISEASE: Primary | ICD-10-CM

## 2024-11-19 DIAGNOSIS — M54.41 CHRONIC BILATERAL LOW BACK PAIN WITH BILATERAL SCIATICA: ICD-10-CM

## 2024-11-19 DIAGNOSIS — G89.29 CHRONIC BILATERAL LOW BACK PAIN WITH BILATERAL SCIATICA: ICD-10-CM

## 2024-11-19 DIAGNOSIS — G89.29 NECK PAIN, CHRONIC: ICD-10-CM

## 2024-11-19 DIAGNOSIS — M54.2 NECK PAIN, CHRONIC: ICD-10-CM
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
## 2024-11-26 ENCOUNTER — OFFICE VISIT (OUTPATIENT)
Facility: CLINIC | Age: 39
End: 2024-11-26
Payer: COMMERCIAL

## 2024-11-26 VITALS
SYSTOLIC BLOOD PRESSURE: 117 MMHG | HEIGHT: 62 IN | OXYGEN SATURATION: 99 % | RESPIRATION RATE: 18 BRPM | DIASTOLIC BLOOD PRESSURE: 85 MMHG | WEIGHT: 134 LBS | BODY MASS INDEX: 24.66 KG/M2 | HEART RATE: 94 BPM

## 2024-11-26 DIAGNOSIS — M51.369 BULGE OF LUMBAR DISC WITHOUT MYELOPATHY: ICD-10-CM

## 2024-11-26 DIAGNOSIS — M54.2 CERVICALGIA: ICD-10-CM

## 2024-11-26 DIAGNOSIS — M54.9 DORSALGIA, UNSPECIFIED: ICD-10-CM

## 2024-11-26 DIAGNOSIS — M54.16 LUMBAR RADICULOPATHY: Primary | ICD-10-CM

## 2024-11-26 PROCEDURE — 99214 OFFICE O/P EST MOD 30 MIN: CPT | Mod: ,,, | Performed by: NURSE PRACTITIONER

## 2024-11-26 PROCEDURE — 3074F SYST BP LT 130 MM HG: CPT | Mod: CPTII,,, | Performed by: NURSE PRACTITIONER

## 2024-11-26 PROCEDURE — 1159F MED LIST DOCD IN RCRD: CPT | Mod: CPTII,,, | Performed by: NURSE PRACTITIONER

## 2024-11-26 PROCEDURE — 1160F RVW MEDS BY RX/DR IN RCRD: CPT | Mod: CPTII,,, | Performed by: NURSE PRACTITIONER

## 2024-11-26 PROCEDURE — 3079F DIAST BP 80-89 MM HG: CPT | Mod: CPTII,,, | Performed by: NURSE PRACTITIONER

## 2024-11-26 PROCEDURE — 3008F BODY MASS INDEX DOCD: CPT | Mod: CPTII,,, | Performed by: NURSE PRACTITIONER

## 2024-11-26 RX ORDER — CYANOCOBALAMIN 1000 UG/ML
1000 INJECTION, SOLUTION INTRAMUSCULAR; SUBCUTANEOUS
COMMUNITY
Start: 2024-11-16

## 2024-11-26 NOTE — PROGRESS NOTES
[facility]    Subjective:     Chief Complaint: Pain (Pt denies any back at this time,  but does c/o neck pain. /)    Referred by: No ref. provider found     History of Present Illness: Padmini La is a 39 y.o. female presents today a two-month follow-up for low back and leg pain and consider if she is a candidate for an epidural steroid injection.  Patient confirms her primary pain is located to her bilateral lower back that will radiate in a posterior lateral pattern down both of her legs.  Her pain will elevate to a 6/10 with prolonged standing and hip rotation especially with strength training, chores and dancing she can no longer do anymore due to the intense pain that causes afterwards.  Her pain will reduced to a 3-4/10 when she performs chores or activities in intervals and will take rates with sitting as long as she does not sit too long.  In the past she has tried tramadol, Motrin.  She also takes Lyrica 25 mg twice a day.  She is taking baclofen for muscle spasms however it makes her pretty groggy and wants to see if she can change this to a different muscle relaxer.  Additionally she reports pain to her posterior neck that cause spasms in the region.  Although she has completed physical therapy she has not tried dry needling in the cervical region and would like a referral to [facility] to do this.  She denies any myelopathic symptoms and states she does not have spasms in her hands.  She has not dropping items.  However she does have spasms to her paraspinal muscles in the neck.  She has no urinary retention and no fecal incontinence.  No spasticity in her toes or fingers.  In the past she has received chiropractic adjustments and massages that helped temporarily.  Her primary pain today is located to her back and bilateral legs.  She has completed three total epidural steroid injections in 2024.  She would like to move forward with bilateral L5 epidural steroid injection, as this last bilateral  L5 epidural steroid injection several months ago gave her excellent pain relief in his now starting to return.    History of Present Illness during last office visit with me on 09/26/2024 included the following:   : Padmini La is a 39 y.o. female presents today as a postop follow-up for pain associated with cervical degenerative disc disease and radiculopathy after receiving a cervical epidural steroid injection C7-T1 on 09/12/2024.  Overall she reports about 60-70% reduction of pain relief to her neck.  Initially she reported some pressure from the epidural steroid injection however she has not been having headaches as frequently and not having to do the therapeutic cupping as much.  Overall she is much more satisfied.  Her pain will flare up when she looks down or pulls her arms up in a pattern like you would use when your painting or side-to-side motions will also aggravate her neck pain.  These activities will elevate her pain score to a 10/10 on the NRS.  She takes Lyrica 25 mg daily and 800 mg of ibuprofen for neck as well as back and bilateral leg pain that travels in a posterior pattern to the foot.  She plans to follow-up with neurosurgeon Dr. Cynthia Mccullough and see what the next steps are for her neck pain.     Her back pain is returning since her last epidural steroid injection to bilateral L5 several months ago.  Her pain is located to bilateral low back, bilateral buttocks, bilateral hips and will radiate down to her posterior legs.  Bending rotating her hip, exercising and mopping will exacerbate this pain especially if it has any kind of rotation involved in it.  These activities will elevate her pain score to a 10/10 on the NRS.  Her pain will reduce minimally by lying flat and cupping as well as using Tylenol and ibuprofen.  It was too soon to repeat this lumbar epidural steroid injection as she just received a cervical epidural steroid injection.    Pertinent PMH:  Chronic migraines, fibromyalgia,  "IBS, kidney stones,  Pertinent PSH:  No spinal surgeries, pacemaker, defibrillator or spinal cord stimulator      Vital signs:   Visit Vitals  /85 (BP Location: Left arm, Patient Position: Sitting)   Pulse 94   Resp 18   Ht 5' 2" (1.575 m)   Wt 60.8 kg (134 lb)   SpO2 99%   BMI 24.51 kg/m²      Vitals:    11/26/24 1111   PainSc: 0-No pain   PainLoc: Back     Pain Disability Index (PDI): 30       Interventional Pain History  09/12/2024:  KEVAN C7-T1   05/06/2024: KEVAN C7-T1  03/04/2024 bilateral L5 TFESI    ROS neck and upper extremity pain to arms.  + back and leg pain      MRI cervical spine 2024  (outside imaging located in  per [facility])   Findings: Study demonstrates normal alignment.  No fractures or subluxation.  Marrow signal is normal.  Normal craniocervical junction.  Cervical cord is intact and normal in signal.  Unremarkable prevertebral soft tissues.      C2-C3: No disc herniation.  No spinal canal stenosis.    C3-C4: Left-sided uncovertebral joint hypertrophy with mild left foraminal stenosis.    C4-C5:  Central to left lateral disc bulge with no significant canal or foraminal stenosis.  Mild effacement of the ventral thecal sac.    C5-C6: Broad-based disc bulge with effacement of the ventral thecal sac and mild flattening the cord.  Bilateral facet arthropathy.  No significant foraminal stenosis.    C6-C7: No disc herniation.  No spinal canal stenosis.    C7-T1: No disc herniation.  No spinal canal stenosis.    IMPRESSION:  Degenerative changes of the cervical spine as described.  Cervical CT spine 2019:  FINDINGS  Images were reviewed in bone, soft tissue, and lung windows.  Absence of intravenous contrast limits assessment of the visualized  soft tissues and vascular structures.     SPINAL COLUMN  Vertebrae: No compression deformity, acute displacement, or focal  abnormality.  Alignment: No listhesis or evidence of traumatic subluxation.  Curvature: Straightening " of the cervical spine is evident.  Disc spaces: No significant narrowing or contour abnormality.  Spinal canal: No evidence of significant stenosis.  Neuroforamina: No significant narrowing appreciated bilaterally.     EXTRASPINAL TISSUES  Prevertebral: No thickening or focal contour irregularity.  Musculature: No acute or focal lesion.     MISCELLANEOUS  Mediastinum: Unremarkable.  Lung apices: Unremarkable.  Skull base: No acute osseous irregularity or focal intracranial  lesion.  Intracranial: No acute or focal abnormality.     IMPRESSION  1.  No acute displacement or focal cervical spine abnormality.  2.  Straightening of the cervical lordosis may be positional or  secondary to muscle spasm.         Electronically Signed By: Doug Huang MD  Date/Time Signed: 03/21/2019 16:45      MRI lumbar spine 2023:  FINDINGS:  There are 5 non-rib-bearing lumbar type vertebral bodies.  Lumbar spine alignment is anatomic without spondylolisthesis or pars interarticularis fracture.  The T12 and lumbar vertebral bodies are normal in morphology and there is no acute or chronic fracture.  There is a hemangioma at the superior L1 vertebral body with no additional osseous lesion or marrow signal abnormality.  The visualized distal thoracic spinal cord and conus are normal with the conus terminating at the upper L1 vertebral body level.  No paravertebral soft tissue abnormality is identified.  Additional changes of the spine on a level by level basis are as follows:     T12-L1: Normal on sagittal imaging.     L1-L2 through L3-L4: No significant abnormality or stenosis.     L4-L5: Minimal bilateral degenerative facet change with no additional abnormality and no stenosis.     L5-S1: Intervertebral disc desiccation and mild-to-moderate disc space narrowing.  Shallow broad-based central disc protrusion measuring 4 x 13 mm in AP and TV extent and effacing the ventral epidural fat with no significant impingement upon the ventral  thecal sac.  A posterior peripheral annular fissure is suggested.  There is also minimal bilateral degenerative facet change.  No spinal canal or lateral recess stenosis and no neural foramen stenosis.  The visualized sacrum and sacroiliac joints are normal.     Impression:     Degenerative disc disease at L5-S1 with shallow broad-based central disc protrusion and posterior annular fissure suggested.     Minimal bilateral degenerative facet change at L4-S1.     No spinal canal, lateral recess, or neural foramen stenosis.        Electronically signed by:Kenneth Figueroa  Date:                                            08/03/2023           Objective:        Physical Exam  General: Well developed; normal A&O x 3; No anxiety/depression; NAD  Mental Status: Oriented to person, palce and time. Displays appropriate mood & affect.  Head: Norm cephalic and atraumatic  Neck:  Bilateral cervical paraspinal banding.  Full range of motion with lateral turning and cervical flexion +extension.  Equal handgrip bilaterally  Eyes: normal conjunctiva, normal lids, normal pupils  ENT and mouth: normal external ear, nose, and no lesions noted on the lips.  Respiratory: Symmetrical, Unlabored. No dyspnea  CV: normal rhythm and rate. No peripheral edema.   Abdomen: Non-distended    Extremities:  Gen: No cyanosis or tenderness to palpation bilateral upper and lower extremities  Skin: Warm, pink, dry, no rashes, no lesions on the lumbar spine  Strength: 5/5 motor strength bilateral upper and lower extremities  ROM: Full ROM in bilateral knees and ankles without pain or instability.    Neuro:  Gait: no altalgic lean, normal toe and heel raise. Independent ambulator.  DTR's: 2+ in bilateral patellar, and ankle  Sensory: Intact to light touch bilateral  upper and lower extremities    Spine: Normal lordosis. No scoliosis  L-spine ROM:  Limited and pain ROM to flexion, extension, bilateral rotation,   Straight Leg Raise:  Positive right, positive  left  SI Joint: No tenderness to palpation bilaterally.      Assessment:     Padmini La is a 39 y.o. female presents today a two-month follow-up for low back and leg pain and consider if she is a candidate for an epidural steroid injection.  Patient confirms her primary pain is located to her bilateral lower back that will radiate in a posterior lateral pattern down both of her legs.  Her pain will elevate to a 6/10 with prolonged standing and hip rotation especially with strength training, chores and dancing she can no longer do anymore due to the intense pain that causes afterwards.  Her pain will reduced to a 3-4/10 when she performs chores or activities in intervals and will take rates with sitting as long as she does not sit too long.  In the past she has tried tramadol, Motrin.  She also takes Lyrica 25 mg twice a day.  She is taking baclofen for muscle spasms however it makes her pretty groggy and wants to see if she can change this to a different muscle relaxer.  Additionally she reports pain to her posterior neck that cause spasms in the region.  Although she has completed physical therapy she has not tried dry needling in the cervical region and would like a referral to [facility] to do this.  She denies any myelopathic symptoms and states she does not have spasms in her hands.  She has not dropping items.  However she does have spasms to her paraspinal muscles in the neck.  She has no urinary retention and no fecal incontinence.  No spasticity in her toes or fingers.  In the past she has received chiropractic adjustments and massages that helped temporarily.  Her primary pain today is located to her back and bilateral legs.  She has completed three total epidural steroid injections in 2024.  She would like to move forward with bilateral L5 epidural steroid injection, as this last bilateral L5 epidural steroid injection several months ago gave her excellent pain relief in his now starting to return.  Plan:    Request sent for bilateral L5 TFESI in January. She has completed 3 ESIs this year   She will f/u with Dr. Mccullough if conservative treatments fail.   Stopped baclofen, prescription sent for tizanidine 2 mg b.i.d.  Order placed for dry needling [facility] Dallus to the cervical region.  She will follow-up in 8 weeks to go over effectiveness dry needling    Encounter Diagnoses   Name Primary?    Lumbar radiculopathy Yes    Cervicalgia          Plan:       Padmini was seen today for pain.    Diagnoses and all orders for this visit:    Lumbar radiculopathy    Cervicalgia           Past Medical History:   Diagnosis Date    Allergy     Annular tear of lumbar disc     Chronic migraine without aura     Fibromyalgia     IBS (irritable bowel syndrome)     Kidney stone     Lumbar radiculopathy        Past Surgical History:   Procedure Laterality Date    COLONOSCOPY  11/25/2020    Dr Elaine Daley    EPIDURAL STEROID INJECTION INTO CERVICAL SPINE N/A 5/6/2024    Procedure: INJECTION, STEROID, SPINE, CERVICAL, EPIDURAL C7-T1;  Surgeon: Jayla Mederos MD;  Location: [facility] OR;  Service: Pain Management;  Laterality: N/A;    EPIDURAL STEROID INJECTION INTO CERVICAL SPINE N/A 9/12/2024    Procedure: INJECTION, STEROID, SPINE, CERVICAL, EPIDURAL  ///C7-T1;  Surgeon: Jayla Mederos MD;  Location: [facility] OR;  Service: Pain Management;  Laterality: N/A;  KEVAN C7-T1    LITHOTRIPSY      si joint injection Bilateral     TRANSFORAMINAL EPIDURAL INJECTION OF STEROID Bilateral 11/20/2023    Procedure: INJECTION, STEROID, EPIDURAL, TRANSFORAMINAL APPROACH Bilateral L5 No Sedation  Local only;  Surgeon: Jayla Mederos MD;  Location: [facility] OR;  Service: Pain Management;  Laterality: Bilateral;  Bilateral TFESI L5 NO SEDATION    TRANSFORAMINAL EPIDURAL INJECTION OF STEROID Bilateral 3/4/2024    Procedure: INJECTION, STEROID, EPIDURAL, TRANSFORAMINAL APPROACH  Bilateral L5   * patient does not wants sedation;  Surgeon: Jayla Mederos MD;  Location:  [facility] OR;  Service: Pain Management;  Laterality: Bilateral;  Bilateral TFESI L5    UPPER GASTROINTESTINAL ENDOSCOPY         Family History   Problem Relation Name Age of Onset    Heart disease Mother      Hypertension Mother      Hypertension Father      Heart disease Father      Hypertension Sister      Crohn's disease Brother         Social History     Socioeconomic History    Marital status:    Tobacco Use    Smoking status: Never     Passive exposure: Never    Smokeless tobacco: Never   Substance and Sexual Activity    Alcohol use: Yes     Comment: occasionally    Drug use: No    Sexual activity: Yes     Partners: Male       Current Outpatient Medications   Medication Sig Dispense Refill    ALPRAZolam (XANAX) 0.5 MG tablet Take 1 tablet (0.5 mg total) by mouth as needed for Anxiety. 90 tablet 3    baclofen (LIORESAL) 10 MG tablet Take 1 tablet (10 mg total) by mouth every evening. 90 tablet 3    CALCIUM CIT-MAGNESIUM-D3-ZINC ORAL Oral QAM      cyanocobalamin 1,000 mcg/mL injection Inject 1,000 mcg into the muscle every 7 days.      desvenlafaxine succinate (PRISTIQ) 50 MG Tb24 Take 1 tablet (50 mg total) by mouth once daily. 90 tablet 3    fexofenadine (ALLEGRA ALLERGY) 60 MG tablet 1 tab(s) orally 2 times a day      fluticasone propionate (FLONASE) 50 mcg/actuation nasal spray 2 sprays by Each Nostril route 2 (two) times daily.      ibuprofen (ADVIL,MOTRIN) 600 MG tablet 1 tab(s) orally tid for 30 day(s)      INV TESTOSTERONE/ANASTROZOL OR PLACEBO PELLETS Inject 1 Pellet into the skin every 3 (three) months. FOR INVESTIGATIONAL USE ONLY      ipratropium (ATROVENT) 21 mcg (0.03 %) nasal spray SMARTSI Spray(s) Both Nares 3 Times Daily PRN      loratadine-pseudoephedrine 5-120 mg (CLARITIN-D 12-HOUR) 5-120 mg per tablet Claritin-D 12 Hour Take No date recorded No form recorded No frequency recorded No route recorded No set duration recorded No set duration amount recorded active No dosage strength  recorded No dosage strength units of measure recorded      lubiprostone (AMITIZA) 8 MCG Cap Take 1 capsule (8 mcg total) by mouth 2 (two) times daily. 60 capsule 5    magnesium aspartate HCl 61 mg (615 mg) TbEC Take by mouth once.      montelukast (SINGULAIR) 10 mg tablet Take 10 mg by mouth once daily.      multivitamin capsule Take 1 capsule by mouth every morning.      naltrexone (DEPADE) 50 mg tablet Take 100 mg by mouth every morning. Taking 100mg one day then 50 mg the next   She is alternating between 100 mg and 50 mg very other day      ondansetron (ZOFRAN-ODT) 4 MG TbDL Zofran ODT Take No date recorded No form recorded No frequency recorded No route recorded No set duration recorded No set duration amount recorded suspended No dosage strength recorded No dosage strength units of measure recorded      pregabalin (LYRICA) 25 MG capsule Take 1 capsule (25 mg total) by mouth once daily. (Patient taking differently: Take 25 mg by mouth 2 (two) times daily.) 30 capsule 2    semaglutide (OZEMPIC) 0.25 mg or 0.5 mg (2 mg/3 mL) pen injector Inject 0.5 mg into the skin every 7 days.      traZODone (DESYREL) 50 MG tablet Take 1 tablet (50 mg total) by mouth nightly. 90 tablet 3    UNABLE TO FIND DIM for testosterone pellets      ZOLMitriptan (ZOMIG) 5 MG tablet Take 1 tablet (5 mg total) by mouth as needed for Migraine (1 pill when the migraine starts, may repeat in 2 h if needed. max 2 in 24 hours.). 30 tablet 5    azelastine (ASTELIN) 137 mcg (0.1 %) nasal spray 2 sprays by Nasal route 2 (two) times daily.       No current facility-administered medications for this visit.       Review of patient's allergies indicates:   Allergen Reactions    Lactase      Other reaction(s): Inflammation , bloating , cramps    Sulfabenzamide      Cutaneous eruption    Sulfacetamide      Cutaneous eruption    Sulfamethoxazole-trimethoprim      Other reaction(s): Abnormal vaginal bleeding    Sulfathiazole      Cutaneous eruption    Egg       Other reaction(s): Abdominal bloating    Sulfa (sulfonamide antibiotics) Rash

## 2024-12-01 ENCOUNTER — PATIENT MESSAGE (OUTPATIENT)
Dept: GASTROENTEROLOGY | Facility: CLINIC | Age: 39
End: 2024-12-01
Payer: COMMERCIAL

## 2024-12-02 NOTE — TELEPHONE ENCOUNTER
I have sent in Amitiza 24 mcg twice daily and addended patient's previous note.  Please have her notify the office within 2 weeks regarding medication effectiveness.  If she fails this dosage, we will consider prescribing Trulance again and see if the insurance covers the medication at that time.  Thanks

## 2024-12-13 DIAGNOSIS — K59.04 CHRONIC IDIOPATHIC CONSTIPATION: Primary | ICD-10-CM

## 2024-12-13 RX ORDER — PLECANATIDE 3 MG/1
3 TABLET ORAL DAILY
Qty: 30 TABLET | Refills: 5 | Status: SHIPPED | OUTPATIENT
Start: 2024-12-13

## 2025-02-04 RX ORDER — TIRZEPATIDE 5 MG/.5ML
5 INJECTION, SOLUTION SUBCUTANEOUS
COMMUNITY

## 2025-02-05 ENCOUNTER — HOSPITAL ENCOUNTER (OUTPATIENT)
Facility: HOSPITAL | Age: 40
Discharge: HOME | End: 2025-02-05
Attending: ANESTHESIOLOGY | Admitting: ANESTHESIOLOGY
Payer: COMMERCIAL

## 2025-02-05 VITALS
SYSTOLIC BLOOD PRESSURE: 110 MMHG | DIASTOLIC BLOOD PRESSURE: 78 MMHG | TEMPERATURE: 98 F | RESPIRATION RATE: 18 BRPM | BODY MASS INDEX: 23.61 KG/M2 | OXYGEN SATURATION: 100 % | HEIGHT: 62 IN | WEIGHT: 128.31 LBS | HEART RATE: 68 BPM

## 2025-02-05 DIAGNOSIS — M54.9 CHRONIC BACK PAIN GREATER THAN 3 MONTHS DURATION: Primary | ICD-10-CM

## 2025-02-05 DIAGNOSIS — G89.29 CHRONIC BACK PAIN GREATER THAN 3 MONTHS DURATION: Primary | ICD-10-CM

## 2025-02-05 LAB
B-HCG UR QL: NEGATIVE
CTP QC/QA: YES

## 2025-02-05 PROCEDURE — 63600175 PHARM REV CODE 636 W HCPCS: Performed by: ANESTHESIOLOGY

## 2025-02-05 PROCEDURE — 64483 NJX AA&/STRD TFRM EPI L/S 1: CPT | Mod: 50,,, | Performed by: ANESTHESIOLOGY

## 2025-02-05 PROCEDURE — 64483 NJX AA&/STRD TFRM EPI L/S 1: CPT | Mod: 50 | Performed by: ANESTHESIOLOGY

## 2025-02-05 PROCEDURE — 81025 URINE PREGNANCY TEST: CPT | Performed by: ANESTHESIOLOGY

## 2025-02-05 RX ORDER — LIDOCAINE HYDROCHLORIDE 10 MG/ML
INJECTION, SOLUTION INFILTRATION; PERINEURAL
Status: DISCONTINUED
Start: 2025-02-05 | End: 2025-02-05 | Stop reason: HOSPADM

## 2025-02-05 RX ORDER — DEXAMETHASONE SODIUM PHOSPHATE 10 MG/ML
INJECTION, SOLUTION INTRA-ARTICULAR; INTRALESIONAL; INTRAMUSCULAR; INTRAVENOUS; SOFT TISSUE
Status: DISCONTINUED | OUTPATIENT
Start: 2025-02-05 | End: 2025-02-05 | Stop reason: HOSPADM

## 2025-02-05 RX ORDER — LIDOCAINE HYDROCHLORIDE 10 MG/ML
INJECTION, SOLUTION EPIDURAL; INFILTRATION; INTRACAUDAL; PERINEURAL
Status: DISCONTINUED | OUTPATIENT
Start: 2025-02-05 | End: 2025-02-05 | Stop reason: HOSPADM

## 2025-02-05 RX ORDER — DIAZEPAM 5 MG/1
5 TABLET ORAL
Status: DISCONTINUED | OUTPATIENT
Start: 2025-02-05 | End: 2025-02-05 | Stop reason: HOSPADM

## 2025-02-05 RX ORDER — BUPIVACAINE HYDROCHLORIDE 2.5 MG/ML
INJECTION, SOLUTION EPIDURAL; INFILTRATION; INTRACAUDAL
Status: DISCONTINUED
Start: 2025-02-05 | End: 2025-02-05 | Stop reason: HOSPADM

## 2025-02-05 RX ORDER — DEXAMETHASONE SODIUM PHOSPHATE 10 MG/ML
INJECTION, SOLUTION INTRA-ARTICULAR; INTRALESIONAL; INTRAMUSCULAR; INTRAVENOUS; SOFT TISSUE
Status: DISCONTINUED
Start: 2025-02-05 | End: 2025-02-05 | Stop reason: HOSPADM

## 2025-02-05 RX ORDER — BUPIVACAINE HYDROCHLORIDE 2.5 MG/ML
INJECTION, SOLUTION EPIDURAL; INFILTRATION; INTRACAUDAL
Status: DISCONTINUED | OUTPATIENT
Start: 2025-02-05 | End: 2025-02-05 | Stop reason: HOSPADM

## 2025-02-05 NOTE — OP NOTE
Procedure:    Left L5  transforaminal epidural steroid injection    Right L5  transforaminal epidural steroid injection    Pre-Procedure Diagnoses:  Chronic back pain greater than 3 months  Lumbar degenerative disc disease  Lumbar radiculopathy  Lumbar disc displacement    Post-Procedure Diagnoses:  Chronic back pain greater than 3 months  Lumbar degenerative disc disease  Lumbar radiculopathy  Lumbar disc displacement    Anesthesia:  Local    Estimated Blood Loss:  < 2 ML    Consent:  The procedure, risks, benefits, and alternatives were discussed with the patient.  The patient voiced understanding and fully informed written consent was obtained.    Description of the Procedure:  The patient was taken to the operating room and placed in the prone position. The skin overlying the lumbar spine was prepped with Chloraprep and draped in the usual sterile fashion.  An oblique fluoroscopic view was obtained on the left side at L5, with the superior articular process of the sacral ala aligned with the pedicle.  Skin anesthesia was achieved using 2 mL of lidocaine 1%.  A 22-gauge 3.5 -inch Quinke spinal needle was inserted and advanced under intermittent fluoroscopic views into the epidural space. Proper needle position was confirmed under AP, oblique, and lateral fluoroscopic views.  Negative aspiration for blood or CSF was confirmed. 1 mL of contrast was injected, which revealed spread into the epidural space.  Then a combination of 5 mg of dexamethasone with 1 mL of 0.25% bupivacaine was easily injected.   There was no pain on injection. The needle was removed intact and bleeding was nil.  The same procedure was repeated in identical fashion on the right side at L5 .  Sterile bandages were applied. The patient was taken to the recovery room for further observation in stable condition. The patient was then discharged home with no complications.

## 2025-02-05 NOTE — H&P
[facility]     Subjective:      Chief Complaint: Pain (Pt denies any back at this time,  but does c/o neck pain. /)     Referred by: No ref. provider found      History of Present Illness: Padmini La is a 39 y.o. female presents today a two-month follow-up for low back and leg pain and consider if she is a candidate for an epidural steroid injection.  Patient confirms her primary pain is located to her bilateral lower back that will radiate in a posterior lateral pattern down both of her legs.  Her pain will elevate to a 6/10 with prolonged standing and hip rotation especially with strength training, chores and dancing she can no longer do anymore due to the intense pain that causes afterwards.  Her pain will reduced to a 3-4/10 when she performs chores or activities in intervals and will take rates with sitting as long as she does not sit too long.  In the past she has tried tramadol, Motrin.  She also takes Lyrica 25 mg twice a day.  She is taking baclofen for muscle spasms however it makes her pretty groggy and wants to see if she can change this to a different muscle relaxer.  Additionally she reports pain to her posterior neck that cause spasms in the region.  Although she has completed physical therapy she has not tried dry needling in the cervical region and would like a referral to [facility] to do this.  She denies any myelopathic symptoms and states she does not have spasms in her hands.  She has not dropping items.  However she does have spasms to her paraspinal muscles in the neck.  She has no urinary retention and no fecal incontinence.  No spasticity in her toes or fingers.  In the past she has received chiropractic adjustments and massages that helped temporarily.  Her primary pain today is located to her back and bilateral legs.  She has completed three total epidural steroid injections in 2024.  She would like to move forward with bilateral L5 epidural steroid injection, as this last  bilateral L5 epidural steroid injection several months ago gave her excellent pain relief in his now starting to return.     History of Present Illness during last office visit with me on 09/26/2024 included the following:   : Padmini La is a 39 y.o. female presents today as a postop follow-up for pain associated with cervical degenerative disc disease and radiculopathy after receiving a cervical epidural steroid injection C7-T1 on 09/12/2024.  Overall she reports about 60-70% reduction of pain relief to her neck.  Initially she reported some pressure from the epidural steroid injection however she has not been having headaches as frequently and not having to do the therapeutic cupping as much.  Overall she is much more satisfied.  Her pain will flare up when she looks down or pulls her arms up in a pattern like you would use when your painting or side-to-side motions will also aggravate her neck pain.  These activities will elevate her pain score to a 10/10 on the NRS.  She takes Lyrica 25 mg daily and 800 mg of ibuprofen for neck as well as back and bilateral leg pain that travels in a posterior pattern to the foot.  She plans to follow-up with neurosurgeon Dr. Cynthia Mccullough and see what the next steps are for her neck pain.     Her back pain is returning since her last epidural steroid injection to bilateral L5 several months ago.  Her pain is located to bilateral low back, bilateral buttocks, bilateral hips and will radiate down to her posterior legs.  Bending rotating her hip, exercising and mopping will exacerbate this pain especially if it has any kind of rotation involved in it.  These activities will elevate her pain score to a 10/10 on the NRS.  Her pain will reduce minimally by lying flat and cupping as well as using Tylenol and ibuprofen.  It was too soon to repeat this lumbar epidural steroid injection as she just received a cervical epidural steroid injection.     Pertinent PMH:  Chronic migraines,  "fibromyalgia, IBS, kidney stones,  Pertinent PSH:  No spinal surgeries, pacemaker, defibrillator or spinal cord stimulator        Vital signs:   Visit Vitals  /85 (BP Location: Left arm, Patient Position: Sitting)   Pulse 94   Resp 18   Ht 5' 2" (1.575 m)   Wt 60.8 kg (134 lb)   SpO2 99%   BMI 24.51 kg/m²      Vitals       Vitals:     11/26/24 1111   PainSc: 0-No pain   PainLoc: Back         Pain Disability Index (PDI): 30     Interventional Pain History  09/12/2024:  KEVAN C7-T1   05/06/2024: KEVAN C7-T1  03/04/2024 bilateral L5 TFESI     ROS neck and upper extremity pain to arms.  + back and leg pain        MRI cervical spine 2024  (outside imaging located in  per [facility] [place])   Findings: Study demonstrates normal alignment.  No fractures or subluxation.  Marrow signal is normal.  Normal craniocervical junction.  Cervical cord is intact and normal in signal.  Unremarkable prevertebral soft tissues.       C2-C3: No disc herniation.  No spinal canal stenosis.    C3-C4: Left-sided uncovertebral joint hypertrophy with mild left foraminal stenosis.    C4-C5:  Central to left lateral disc bulge with no significant canal or foraminal stenosis.  Mild effacement of the ventral thecal sac.    C5-C6: Broad-based disc bulge with effacement of the ventral thecal sac and mild flattening the cord.  Bilateral facet arthropathy.  No significant foraminal stenosis.    C6-C7: No disc herniation.  No spinal canal stenosis.    C7-T1: No disc herniation.  No spinal canal stenosis.     IMPRESSION:  Degenerative changes of the cervical spine as described.  Cervical CT spine 2019:  FINDINGS  Images were reviewed in bone, soft tissue, and lung windows.  Absence of intravenous contrast limits assessment of the visualized  soft tissues and vascular structures.     SPINAL COLUMN  Vertebrae: No compression deformity, acute displacement, or focal  abnormality.  Alignment: No listhesis or evidence of traumatic " subluxation.  Curvature: Straightening of the cervical spine is evident.  Disc spaces: No significant narrowing or contour abnormality.  Spinal canal: No evidence of significant stenosis.  Neuroforamina: No significant narrowing appreciated bilaterally.     EXTRASPINAL TISSUES  Prevertebral: No thickening or focal contour irregularity.  Musculature: No acute or focal lesion.     MISCELLANEOUS  Mediastinum: Unremarkable.  Lung apices: Unremarkable.  Skull base: No acute osseous irregularity or focal intracranial  lesion.  Intracranial: No acute or focal abnormality.     IMPRESSION  1.  No acute displacement or focal cervical spine abnormality.  2.  Straightening of the cervical lordosis may be positional or  secondary to muscle spasm.         Electronically Signed By: Doug Huang MD  Date/Time Signed: 03/21/2019 16:45        MRI lumbar spine 2023:  FINDINGS:  There are 5 non-rib-bearing lumbar type vertebral bodies.  Lumbar spine alignment is anatomic without spondylolisthesis or pars interarticularis fracture.  The T12 and lumbar vertebral bodies are normal in morphology and there is no acute or chronic fracture.  There is a hemangioma at the superior L1 vertebral body with no additional osseous lesion or marrow signal abnormality.  The visualized distal thoracic spinal cord and conus are normal with the conus terminating at the upper L1 vertebral body level.  No paravertebral soft tissue abnormality is identified.  Additional changes of the spine on a level by level basis are as follows:     T12-L1: Normal on sagittal imaging.     L1-L2 through L3-L4: No significant abnormality or stenosis.     L4-L5: Minimal bilateral degenerative facet change with no additional abnormality and no stenosis.     L5-S1: Intervertebral disc desiccation and mild-to-moderate disc space narrowing.  Shallow broad-based central disc protrusion measuring 4 x 13 mm in AP and TV extent and effacing the ventral epidural fat with no  significant impingement upon the ventral thecal sac.  A posterior peripheral annular fissure is suggested.  There is also minimal bilateral degenerative facet change.  No spinal canal or lateral recess stenosis and no neural foramen stenosis.  The visualized sacrum and sacroiliac joints are normal.     Impression:     Degenerative disc disease at L5-S1 with shallow broad-based central disc protrusion and posterior annular fissure suggested.     Minimal bilateral degenerative facet change at L4-S1.     No spinal canal, lateral recess, or neural foramen stenosis.        Electronically signed by:Kenneth Figueroa  Date:                                            08/03/2023              Objective:      Objective  Physical Exam  General: Well developed; normal A&O x 3; No anxiety/depression; NAD  Mental Status: Oriented to person, palce and time. Displays appropriate mood & affect.  Head: Norm cephalic and atraumatic  Neck:  Bilateral cervical paraspinal banding.  Full range of motion with lateral turning and cervical flexion +extension.  Equal handgrip bilaterally  Eyes: normal conjunctiva, normal lids, normal pupils  ENT and mouth: normal external ear, nose, and no lesions noted on the lips.  Respiratory: Symmetrical, Unlabored. No dyspnea  CV: normal rhythm and rate. No peripheral edema.   Abdomen: Non-distended     Extremities:  Gen: No cyanosis or tenderness to palpation bilateral upper and lower extremities  Skin: Warm, pink, dry, no rashes, no lesions on the lumbar spine  Strength: 5/5 motor strength bilateral upper and lower extremities  ROM: Full ROM in bilateral knees and ankles without pain or instability.     Neuro:  Gait: no altalgic lean, normal toe and heel raise. Independent ambulator.  DTR's: 2+ in bilateral patellar, and ankle  Sensory: Intact to light touch bilateral  upper and lower extremities     Spine: Normal lordosis. No scoliosis  L-spine ROM:  Limited and pain ROM to flexion, extension, bilateral  rotation,   Straight Leg Raise:  Positive right, positive left  SI Joint: No tenderness to palpation bilaterally.        Assessment:      Assessment  Padmini La is a 39 y.o. female presents today a two-month follow-up for low back and leg pain and consider if she is a candidate for an epidural steroid injection.  Patient confirms her primary pain is located to her bilateral lower back that will radiate in a posterior lateral pattern down both of her legs.  Her pain will elevate to a 6/10 with prolonged standing and hip rotation especially with strength training, chores and dancing she can no longer do anymore due to the intense pain that causes afterwards.  Her pain will reduced to a 3-4/10 when she performs chores or activities in intervals and will take rates with sitting as long as she does not sit too long.  In the past she has tried tramadol, Motrin.  She also takes Lyrica 25 mg twice a day.  She is taking baclofen for muscle spasms however it makes her pretty groggy and wants to see if she can change this to a different muscle relaxer.  Additionally she reports pain to her posterior neck that cause spasms in the region.  Although she has completed physical therapy she has not tried dry needling in the cervical region and would like a referral to [facility] to do this.  She denies any myelopathic symptoms and states she does not have spasms in her hands.  She has not dropping items.  However she does have spasms to her paraspinal muscles in the neck.  She has no urinary retention and no fecal incontinence.  No spasticity in her toes or fingers.  In the past she has received chiropractic adjustments and massages that helped temporarily.  Her primary pain today is located to her back and bilateral legs.  She has completed three total epidural steroid injections in 2024.  She would like to move forward with bilateral L5 epidural steroid injection, as this last bilateral L5 epidural steroid injection several months ago  gave her excellent pain relief in his now starting to return.  Plan:   Request sent for bilateral L5 TFESI in January. She has completed 3 ESIs this year   She will f/u with Dr. Mccullough if conservative treatments fail.   Stopped baclofen, prescription sent for tizanidine 2 mg b.i.d.  Order placed for dry needling [facility] to the cervical region.  She will follow-up in 8 weeks to go over effectiveness dry needling          Encounter Diagnoses   Name Primary?    Lumbar radiculopathy Yes    Cervicalgia              Plan:      Plan  Padmini was seen today for pain.     Diagnoses and all orders for this visit:     Lumbar radiculopathy     Cervicalgia                   Past Medical History:   Diagnosis Date    Allergy      Annular tear of lumbar disc      Chronic migraine without aura      Fibromyalgia      IBS (irritable bowel syndrome)      Kidney stone      Lumbar radiculopathy                 Past Surgical History:   Procedure Laterality Date    COLONOSCOPY   11/25/2020     Dr Elaine Daley    EPIDURAL STEROID INJECTION INTO CERVICAL SPINE N/A 5/6/2024     Procedure: INJECTION, STEROID, SPINE, CERVICAL, EPIDURAL C7-T1;  Surgeon: Jayla Mederos MD;  Location: [facility] OR;  Service: Pain Management;  Laterality: N/A;    EPIDURAL STEROID INJECTION INTO CERVICAL SPINE N/A 9/12/2024     Procedure: INJECTION, STEROID, SPINE, CERVICAL, EPIDURAL  ///C7-T1;  Surgeon: Jayla Mederos MD;  Location: [facility] OR;  Service: Pain Management;  Laterality: N/A;  KEVAN C7-T1    LITHOTRIPSY        si joint injection Bilateral      TRANSFORAMINAL EPIDURAL INJECTION OF STEROID Bilateral 11/20/2023     Procedure: INJECTION, STEROID, EPIDURAL, TRANSFORAMINAL APPROACH Bilateral L5 No Sedation  Local only;  Surgeon: Jayla Mederos MD;  Location: [facility] OR;  Service: Pain Management;  Laterality: Bilateral;  Bilateral TFESI L5 NO SEDATION    TRANSFORAMINAL EPIDURAL INJECTION OF STEROID Bilateral 3/4/2024     Procedure: INJECTION, STEROID,  EPIDURAL, TRANSFORAMINAL APPROACH  Bilateral L5   * patient does not wants sedation;  Surgeon: Jayla Mederos MD;  Location: [facility] OR;  Service: Pain Management;  Laterality: Bilateral;  Bilateral TFESI L5    UPPER GASTROINTESTINAL ENDOSCOPY                    Family History   Problem Relation Name Age of Onset    Heart disease Mother        Hypertension Mother        Hypertension Father        Heart disease Father        Hypertension Sister        Crohn's disease Brother             Social History            Socioeconomic History    Marital status:    Tobacco Use    Smoking status: Never       Passive exposure: Never    Smokeless tobacco: Never   Substance and Sexual Activity    Alcohol use: Yes       Comment: occasionally    Drug use: No    Sexual activity: Yes       Partners: Male         Current Medications          Current Outpatient Medications   Medication Sig Dispense Refill    ALPRAZolam (XANAX) 0.5 MG tablet Take 1 tablet (0.5 mg total) by mouth as needed for Anxiety. 90 tablet 3    baclofen (LIORESAL) 10 MG tablet Take 1 tablet (10 mg total) by mouth every evening. 90 tablet 3    CALCIUM CIT-MAGNESIUM-D3-ZINC ORAL Oral QAM        cyanocobalamin 1,000 mcg/mL injection Inject 1,000 mcg into the muscle every 7 days.        desvenlafaxine succinate (PRISTIQ) 50 MG Tb24 Take 1 tablet (50 mg total) by mouth once daily. 90 tablet 3    fexofenadine (ALLEGRA ALLERGY) 60 MG tablet 1 tab(s) orally 2 times a day        fluticasone propionate (FLONASE) 50 mcg/actuation nasal spray 2 sprays by Each Nostril route 2 (two) times daily.        ibuprofen (ADVIL,MOTRIN) 600 MG tablet 1 tab(s) orally tid for 30 day(s)        INV TESTOSTERONE/ANASTROZOL OR PLACEBO PELLETS Inject 1 Pellet into the skin every 3 (three) months. FOR INVESTIGATIONAL USE ONLY        ipratropium (ATROVENT) 21 mcg (0.03 %) nasal spray SMARTSI Spray(s) Both Nares 3 Times Daily PRN        loratadine-pseudoephedrine 5-120 mg (CLARITIN-D  12-HOUR) 5-120 mg per tablet Claritin-D 12 Hour Take No date recorded No form recorded No frequency recorded No route recorded No set duration recorded No set duration amount recorded active No dosage strength recorded No dosage strength units of measure recorded        lubiprostone (AMITIZA) 8 MCG Cap Take 1 capsule (8 mcg total) by mouth 2 (two) times daily. 60 capsule 5    magnesium aspartate HCl 61 mg (615 mg) TbEC Take by mouth once.        montelukast (SINGULAIR) 10 mg tablet Take 10 mg by mouth once daily.        multivitamin capsule Take 1 capsule by mouth every morning.        naltrexone (DEPADE) 50 mg tablet Take 100 mg by mouth every morning. Taking 100mg one day then 50 mg the next   She is alternating between 100 mg and 50 mg very other day        ondansetron (ZOFRAN-ODT) 4 MG TbDL Zofran ODT Take No date recorded No form recorded No frequency recorded No route recorded No set duration recorded No set duration amount recorded suspended No dosage strength recorded No dosage strength units of measure recorded        pregabalin (LYRICA) 25 MG capsule Take 1 capsule (25 mg total) by mouth once daily. (Patient taking differently: Take 25 mg by mouth 2 (two) times daily.) 30 capsule 2    semaglutide (OZEMPIC) 0.25 mg or 0.5 mg (2 mg/3 mL) pen injector Inject 0.5 mg into the skin every 7 days.        traZODone (DESYREL) 50 MG tablet Take 1 tablet (50 mg total) by mouth nightly. 90 tablet 3    UNABLE TO FIND DIM for testosterone pellets        ZOLMitriptan (ZOMIG) 5 MG tablet Take 1 tablet (5 mg total) by mouth as needed for Migraine (1 pill when the migraine starts, may repeat in 2 h if needed. max 2 in 24 hours.). 30 tablet 5    azelastine (ASTELIN) 137 mcg (0.1 %) nasal spray 2 sprays by Nasal route 2 (two) times daily.          No current facility-administered medications for this visit.                  Review of patient's allergies indicates:   Allergen Reactions    Lactase         Other reaction(s):  Inflammation , bloating , cramps    Sulfabenzamide         Cutaneous eruption    Sulfacetamide         Cutaneous eruption    Sulfamethoxazole-trimethoprim         Other reaction(s): Abnormal vaginal bleeding    Sulfathiazole         Cutaneous eruption    Egg         Other reaction(s): Abdominal bloating    Sulfa (sulfonamide antibiotics) Rash

## 2025-02-19 ENCOUNTER — OFFICE VISIT (OUTPATIENT)
Facility: CLINIC | Age: 40
End: 2025-02-19
Payer: COMMERCIAL

## 2025-02-19 VITALS
BODY MASS INDEX: 23.19 KG/M2 | HEART RATE: 77 BPM | WEIGHT: 126 LBS | SYSTOLIC BLOOD PRESSURE: 100 MMHG | HEIGHT: 62 IN | DIASTOLIC BLOOD PRESSURE: 74 MMHG

## 2025-02-19 DIAGNOSIS — M51.26 LUMBAR DISC DISPLACEMENT WITHOUT MYELOPATHY: ICD-10-CM

## 2025-02-19 DIAGNOSIS — M54.16 LUMBAR RADICULOPATHY: Primary | ICD-10-CM

## 2025-02-19 DIAGNOSIS — G89.29 CHRONIC BACK PAIN GREATER THAN 3 MONTHS DURATION: ICD-10-CM

## 2025-02-19 DIAGNOSIS — M54.2 CERVICALGIA: ICD-10-CM

## 2025-02-19 DIAGNOSIS — M54.9 CHRONIC BACK PAIN GREATER THAN 3 MONTHS DURATION: ICD-10-CM

## 2025-02-19 DIAGNOSIS — M46.1 SACROILIITIS: ICD-10-CM

## 2025-02-19 NOTE — PROGRESS NOTES
[facility]    Subjective:     Chief Complaint: Post-op Evaluation (Post op procedure 2/5/25 C/O pain level 3,last night pain level was 6-7, Taking pain meds states hasn't felt any relief yet from procedure )    Referred by: No ref. provider found     History of Present Illness: Padmini La is a 39 y.o. female presents today as a postoperative follow-up for pain associated with lumbar radiculopathy and chronic bilateral sacroiliitis .  Patient relayed she has not received any pain relief from the procedure.  However during her last lumbar epidural steroid injection in the same exact location bilateral L5 transforaminal epidural steroid injection that she completed in March of 2024 did the same thing.  Initially she received no pain relief and there was a delay in and she had excellent pain relief greater than 80% for months.  Her pain is just returned prior to this postop visit.  Patient was also unclear if she truly has rheumatoid arthritis or not.  If so that could be a rate limiting factor of the delay of pain relief.  She reports her pain is still located in the bilateral low back and will radiate in a lateral pattern.  She will on occasion feel hamstring pain.  Patient was agreeable to perform the Marcell's and right-sided thigh thrust as that is where the majority of her pain was located.  She had a positive Marcell's on the right as well as a thigh thrust.  She has not completed sacroiliac joint x-ray in his interested in doing so.  She takes Lyrica 25 mg once a day sparingly as she does not want to become tolerant to this medication.  Her pain score when she is active can elevate to a 7 +/10 on the NRS.  However she also stated that last night her pain level was a 6/7/10..  Her pain score today is 0 3/10 on the NRS.        History of Present Illness with me during last office visit on 11/26/2024 included the following:   : Padmini La is a 39 y.o. female presents today a two-month follow-up for  low back and leg pain and consider if she is a candidate for an epidural steroid injection.  Patient confirms her primary pain is located to her bilateral lower back that will radiate in a posterior lateral pattern down both of her legs.  Her pain will elevate to a 6/10 with prolonged standing and hip rotation especially with strength training, chores and dancing she can no longer do anymore due to the intense pain that causes afterwards.  Her pain will reduced to a 3-4/10 when she performs chores or activities in intervals and will take rates with sitting as long as she does not sit too long.  In the past she has tried tramadol, Motrin.  She also takes Lyrica 25 mg twice a day.  She is taking baclofen for muscle spasms however it makes her pretty groggy and wants to see if she can change this to a different muscle relaxer.  Additionally she reports pain to her posterior neck that cause spasms in the region.  Although she has completed physical therapy she has not tried dry needling in the cervical region and would like a referral to [facility] to do this.  She denies any myelopathic symptoms and states she does not have spasms in her hands.  She has not dropping items.  However she does have spasms to her paraspinal muscles in the neck.  She has no urinary retention and no fecal incontinence.  No spasticity in her toes or fingers.  In the past she has received chiropractic adjustments and massages that helped temporarily.  Her primary pain today is located to her back and bilateral legs.  She has completed three total epidural steroid injections in 2024.  She would like to move forward with bilateral L5 epidural steroid injection, as this last bilateral L5 epidural steroid injection several months ago gave her excellent pain relief in his now starting to return.     Pertinent PMH:  Chronic migraines, fibromyalgia, IBS, kidney stones, (unclear if patient has rheumatoid arthritis)  Pertinent PSH:  No spinal surgeries,  pacemaker, defibrillator or spinal cord stimulator                  Interventional pain history:   0 2/05 2025:  Bilateral L5 TFESI  09/12/2024:  KEVAN C7-T1   05/06/2024: KEVAN C7-T1  03/04/2024 bilateral L5 TFESI      ROS:  Back +leg pain     MRI cervical spine 2024  (outside imaging located in  [facility])   Findings: Study demonstrates normal alignment.  No fractures or subluxation.  Marrow signal is normal.  Normal craniocervical junction.  Cervical cord is intact and normal in signal.  Unremarkable prevertebral soft tissues.       C2-C3: No disc herniation.  No spinal canal stenosis.    C3-C4: Left-sided uncovertebral joint hypertrophy with mild left foraminal stenosis.    C4-C5:  Central to left lateral disc bulge with no significant canal or foraminal stenosis.  Mild effacement of the ventral thecal sac.    C5-C6: Broad-based disc bulge with effacement of the ventral thecal sac and mild flattening the cord.  Bilateral facet arthropathy.  No significant foraminal stenosis.    C6-C7: No disc herniation.  No spinal canal stenosis.    C7-T1: No disc herniation.  No spinal canal stenosis.     IMPRESSION:  Degenerative changes of the cervical spine as described.  Cervical CT spine 2019:  FINDINGS  Images were reviewed in bone, soft tissue, and lung windows.  Absence of intravenous contrast limits assessment of the visualized  soft tissues and vascular structures.     SPINAL COLUMN  Vertebrae: No compression deformity, acute displacement, or focal  abnormality.  Alignment: No listhesis or evidence of traumatic subluxation.  Curvature: Straightening of the cervical spine is evident.  Disc spaces: No significant narrowing or contour abnormality.  Spinal canal: No evidence of significant stenosis.  Neuroforamina: No significant narrowing appreciated bilaterally.     EXTRASPINAL TISSUES  Prevertebral: No thickening or focal contour irregularity.  Musculature: No acute or focal lesion.      MISCELLANEOUS  Mediastinum: Unremarkable.  Lung apices: Unremarkable.  Skull base: No acute osseous irregularity or focal intracranial  lesion.  Intracranial: No acute or focal abnormality.     IMPRESSION  1.  No acute displacement or focal cervical spine abnormality.  2.  Straightening of the cervical lordosis may be positional or  secondary to muscle spasm.         Electronically Signed By: Doug Huang MD  Date/Time Signed: 03/21/2019 16:45        MRI lumbar spine 2023:  FINDINGS:  There are 5 non-rib-bearing lumbar type vertebral bodies.  Lumbar spine alignment is anatomic without spondylolisthesis or pars interarticularis fracture.  The T12 and lumbar vertebral bodies are normal in morphology and there is no acute or chronic fracture.  There is a hemangioma at the superior L1 vertebral body with no additional osseous lesion or marrow signal abnormality.  The visualized distal thoracic spinal cord and conus are normal with the conus terminating at the upper L1 vertebral body level.  No paravertebral soft tissue abnormality is identified.  Additional changes of the spine on a level by level basis are as follows:     T12-L1: Normal on sagittal imaging.     L1-L2 through L3-L4: No significant abnormality or stenosis.     L4-L5: Minimal bilateral degenerative facet change with no additional abnormality and no stenosis.     L5-S1: Intervertebral disc desiccation and mild-to-moderate disc space narrowing.  Shallow broad-based central disc protrusion measuring 4 x 13 mm in AP and TV extent and effacing the ventral epidural fat with no significant impingement upon the ventral thecal sac.  A posterior peripheral annular fissure is suggested.  There is also minimal bilateral degenerative facet change.  No spinal canal or lateral recess stenosis and no neural foramen stenosis.  The visualized sacrum and sacroiliac joints are normal.     Impression:     Degenerative disc disease at L5-S1 with shallow broad-based  central disc protrusion and posterior annular fissure suggested.     Minimal bilateral degenerative facet change at L4-S1.     No spinal canal, lateral recess, or neural foramen stenosis.        Electronically signed by:Kenneth Figueroa  Date:                                            08/03/2023            Objective:        Physical Exam  General: Well developed; normal A&O x 3; No anxiety/depression; NAD  Mental Status: Oriented to person, palce and time. Displays appropriate mood & affect.  Head: Norm cephalic and atraumatic  Neck:  Bilateral cervical paraspinal banding.  Full range of motion with lateral turning and cervical flexion +extension.  Equal handgrip bilaterally  Eyes: normal conjunctiva, normal lids, normal pupils  ENT and mouth: normal external ear, nose, and no lesions noted on the lips.  Respiratory: Symmetrical, Unlabored. No dyspnea  CV: normal rhythm and rate. No peripheral edema.   Abdomen: Non-distended     Extremities:  Gen: No cyanosis or tenderness to palpation bilateral upper and lower extremities  Skin: Warm, pink, dry, no rashes, no lesions on the lumbar spine  Strength: 5/5 motor strength bilateral upper and lower extremities  ROM: Full ROM in bilateral knees and ankles without pain or instability.     Neuro:  Gait: no altalgic lean, normal toe and heel raise. Independent ambulator.  DTR's: 2+ in bilateral patellar, and ankle  Sensory: Intact to light touch bilateral  upper and lower extremities     Spine: Normal lordosis. No scoliosis  L-spine ROM:  Limited and pain ROM to flexion, extension, bilateral rotation,   Straight Leg Raise:  Positive right, positive left  SI Joint: N + right-sided tenderness to palpation bilaterally.  +right Marcell's and right thigh thrust         Assessment:      Padmini DOMINGO La is a 39 y.o. female presents today as a postoperative follow-up for pain associated with lumbar radiculopathy and chronic bilateral sacroiliitis .  Patient relayed she has not received any  pain relief from the procedure.  However during her last lumbar epidural steroid injection in the same exact location bilateral L5 transforaminal epidural steroid injection that she completed in March of 2024 did the same thing.  Initially she received no pain relief and there was a delay in and she had excellent pain relief greater than 80% for months.  Her pain is just returned prior to this postop visit.  Patient was also unclear if she truly has rheumatoid arthritis or not.  If so that could be a rate limiting factor of the delay of pain relief.  She reports her pain is still located in the bilateral low back and will radiate in a lateral pattern.  She will on occasion feel hamstring pain.  Patient was agreeable to perform the Marcell's and right-sided thigh thrust as that is where the majority of her pain was located.  She had a positive Marcell's on the right as well as a thigh thrust.  She has not completed sacroiliac joint x-ray in his interested in doing so.  She takes Lyrica 25 mg once a day sparingly as she does not want to become tolerant to this medication.  Her pain score when she is active can elevate to a 7 +/10 on the NRS.  However she also stated that last night her pain level was a 6/7/10..  Her pain score today is 0 3/10 on the NRS.    Plan of care:   Order placed for SI joint x-ray at [facility] imaging.    Patient will received call back with the results.    I suspect possible right sacroiliitis  Also anticipate a delayed pain relief just as she had before after completing this bilateral L5 epidural steroid injection.  Also presumed she does have rheumatoid arthritis and that is a rate limiting factor of the delayed response after receiving epidural steroid injections.  Encounter Diagnoses   Name Primary?    Lumbar radiculopathy Yes    Lumbar disc displacement without myelopathy     Chronic back pain greater than 3 months duration     Cervicalgia     Sacroiliitis          Plan:       Padmini was seen  today for post-op evaluation.    Diagnoses and all orders for this visit:    Lumbar radiculopathy    Lumbar disc displacement without myelopathy    Chronic back pain greater than 3 months duration    Cervicalgia    Sacroiliitis  -     X-Ray Sacroiliac Joints Complete; Future  -     X-Ray Sacroiliac Joints Complete           Past Medical History:   Diagnosis Date    Allergy     Annular tear of lumbar disc     Chronic migraine without aura     Fibromyalgia     IBS (irritable bowel syndrome)     Kidney stone     Lumbar radiculopathy        Past Surgical History:   Procedure Laterality Date    COLONOSCOPY  11/25/2020    Dr Elaine Daley    EPIDURAL STEROID INJECTION INTO CERVICAL SPINE N/A 5/6/2024    Procedure: INJECTION, STEROID, SPINE, CERVICAL, EPIDURAL C7-T1;  Surgeon: Jayla Mederos MD;  Location: [facility] OR;  Service: Pain Management;  Laterality: N/A;    EPIDURAL STEROID INJECTION INTO CERVICAL SPINE N/A 9/12/2024    Procedure: INJECTION, STEROID, SPINE, CERVICAL, EPIDURAL  ///C7-T1;  Surgeon: Jayla Mederos MD;  Location: [facility] OR;  Service: Pain Management;  Laterality: N/A;  KEVAN C7-T1    LITHOTRIPSY      si joint injection Bilateral     TRANSFORAMINAL EPIDURAL INJECTION OF STEROID Bilateral 11/20/2023    Procedure: INJECTION, STEROID, EPIDURAL, TRANSFORAMINAL APPROACH Bilateral L5 No Sedation  Local only;  Surgeon: Jayla Mederos MD;  Location: [facility] OR;  Service: Pain Management;  Laterality: Bilateral;  Bilateral TFESI L5 NO SEDATION    TRANSFORAMINAL EPIDURAL INJECTION OF STEROID Bilateral 3/4/2024    Procedure: INJECTION, STEROID, EPIDURAL, TRANSFORAMINAL APPROACH  Bilateral L5   * patient does not wants sedation;  Surgeon: Jayla Mederos MD;  Location: [facility] OR;  Service: Pain Management;  Laterality: Bilateral;  Bilateral TFESI L5    TRANSFORAMINAL EPIDURAL INJECTION OF STEROID Bilateral 2/5/2025    Procedure: INJECTION, STEROID, EPIDURAL, TRANSFORAMINAL APPROACH Bilateral L5;  Surgeon:  Jayla Mederos MD;  Location: [facility] OR;  Service: Pain Management;  Laterality: Bilateral;  Bilateral TFESI L5    UPPER GASTROINTESTINAL ENDOSCOPY         Family History   Problem Relation Name Age of Onset    Heart disease Mother      Hypertension Mother      Hypertension Father      Heart disease Father      Hypertension Sister      Crohn's disease Brother         Social History     Socioeconomic History    Marital status:    Tobacco Use    Smoking status: Never     Passive exposure: Never    Smokeless tobacco: Never   Substance and Sexual Activity    Alcohol use: Yes     Comment: occasionally    Drug use: No    Sexual activity: Yes     Partners: Male       Current Medications[1]    Review of patient's allergies indicates:   Allergen Reactions    Lactase      Other reaction(s): Inflammation , bloating , cramps    Sulfabenzamide      Cutaneous eruption    Sulfacetamide      Cutaneous eruption    Sulfamethoxazole-trimethoprim      Other reaction(s): Abnormal vaginal bleeding    Sulfathiazole      Cutaneous eruption    Egg      Other reaction(s): Abdominal bloating    Sulfa (sulfonamide antibiotics) Rash                  [1]   Current Outpatient Medications   Medication Sig Dispense Refill    ALPRAZolam (XANAX) 0.5 MG tablet Take 1 tablet (0.5 mg total) by mouth as needed for Anxiety. 90 tablet 3    azelastine (ASTELIN) 137 mcg (0.1 %) nasal spray 2 sprays by Nasal route 2 (two) times daily.      baclofen (LIORESAL) 10 MG tablet Take 1 tablet (10 mg total) by mouth every evening. 90 tablet 3    cyanocobalamin 1,000 mcg/mL injection Inject 1,000 mcg into the muscle every 7 days.      fexofenadine (ALLEGRA ALLERGY) 60 MG tablet 1 tab(s) orally 2 times a day      fluticasone propionate (FLONASE) 50 mcg/actuation nasal spray 2 sprays by Each Nostril route 2 (two) times daily.      ibuprofen (ADVIL,MOTRIN) 600 MG tablet 1 tab(s) orally tid for 30 day(s)      INV TESTOSTERONE/ANASTROZOL OR PLACEBO PELLETS Inject 1  Pellet into the skin every 3 (three) months. FOR INVESTIGATIONAL USE ONLY      montelukast (SINGULAIR) 10 mg tablet Take 10 mg by mouth once daily.      multivitamin capsule Take 1 capsule by mouth every morning.      naltrexone (DEPADE) 50 mg tablet Take 100 mg by mouth every morning. Taking 100mg one day then 50 mg the next   She is alternating between 100 mg and 50 mg very other day      ondansetron (ZOFRAN-ODT) 4 MG TbDL Zofran ODT Take No date recorded No form recorded No frequency recorded No route recorded No set duration recorded No set duration amount recorded suspended No dosage strength recorded No dosage strength units of measure recorded      plecanatide (TRULANCE) 3 mg Tab Take 1 tablet (3 mg total) by mouth once daily. 30 tablet 5    pregabalin (LYRICA) 25 MG capsule Take 1 capsule (25 mg total) by mouth once daily. (Patient taking differently: Take 25 mg by mouth 2 (two) times daily.) 30 capsule 2    semaglutide (OZEMPIC) 0.25 mg or 0.5 mg (2 mg/3 mL) pen injector Inject 0.5 mg into the skin every 7 days.      tirzepatide (MOUNJARO) 5 mg/0.5 mL PnIj Inject 5 mg into the skin every 7 days.      traZODone (DESYREL) 50 MG tablet Take 1 tablet (50 mg total) by mouth nightly. 90 tablet 3    UNABLE TO FIND DIM for testosterone pellets      ZOLMitriptan (ZOMIG) 5 MG tablet Take 1 tablet (5 mg total) by mouth as needed for Migraine (1 pill when the migraine starts, may repeat in 2 h if needed. max 2 in 24 hours.). 30 tablet 5    desvenlafaxine succinate (PRISTIQ) 50 MG Tb24 Take 1 tablet (50 mg total) by mouth once daily. 90 tablet 3     No current facility-administered medications for this visit.

## 2025-05-06 ENCOUNTER — PATIENT MESSAGE (OUTPATIENT)
Dept: GASTROENTEROLOGY | Facility: CLINIC | Age: 40
End: 2025-05-06
Payer: COMMERCIAL

## 2025-05-07 NOTE — TELEPHONE ENCOUNTER
Submitted PA to both insurance plans. Covered benefit with [facility], [facility] community plan will not cover because pt has primary insurance. Spoke with pharmacy- Processing with both, Medicaid rejecting. They will run with [facility] only. Pt will get new [employer] copay card. Verbalized understanding

## 2025-08-27 ENCOUNTER — OFFICE VISIT (OUTPATIENT)
Dept: GASTROENTEROLOGY | Facility: CLINIC | Age: 40
End: 2025-08-27
Payer: COMMERCIAL

## 2025-08-27 VITALS
BODY MASS INDEX: 22.08 KG/M2 | HEART RATE: 84 BPM | DIASTOLIC BLOOD PRESSURE: 76 MMHG | TEMPERATURE: 98 F | RESPIRATION RATE: 16 BRPM | WEIGHT: 120 LBS | SYSTOLIC BLOOD PRESSURE: 107 MMHG | OXYGEN SATURATION: 99 % | HEIGHT: 62 IN

## 2025-08-27 DIAGNOSIS — K59.04 CHRONIC IDIOPATHIC CONSTIPATION: Primary | ICD-10-CM

## 2025-08-27 PROBLEM — K58.1 IRRITABLE BOWEL SYNDROME WITH CONSTIPATION: Status: RESOLVED | Noted: 2022-07-01 | Resolved: 2025-08-27

## 2025-08-27 PROCEDURE — 3008F BODY MASS INDEX DOCD: CPT | Mod: CPTII,,, | Performed by: NURSE PRACTITIONER

## 2025-08-27 PROCEDURE — 1160F RVW MEDS BY RX/DR IN RCRD: CPT | Mod: CPTII,,, | Performed by: NURSE PRACTITIONER

## 2025-08-27 PROCEDURE — 1159F MED LIST DOCD IN RCRD: CPT | Mod: CPTII,,, | Performed by: NURSE PRACTITIONER

## 2025-08-27 PROCEDURE — 3078F DIAST BP <80 MM HG: CPT | Mod: CPTII,,, | Performed by: NURSE PRACTITIONER

## 2025-08-27 PROCEDURE — 99215 OFFICE O/P EST HI 40 MIN: CPT | Mod: PBBFAC | Performed by: NURSE PRACTITIONER

## 2025-08-27 PROCEDURE — 99214 OFFICE O/P EST MOD 30 MIN: CPT | Mod: S$PBB,,, | Performed by: NURSE PRACTITIONER

## 2025-08-27 PROCEDURE — 3074F SYST BP LT 130 MM HG: CPT | Mod: CPTII,,, | Performed by: NURSE PRACTITIONER

## 2025-08-27 RX ORDER — CEFDINIR 300 MG/1
CAPSULE ORAL
COMMUNITY
Start: 2025-08-22

## 2025-08-27 RX ORDER — PLECANATIDE 3 MG/1
3 TABLET ORAL DAILY
Qty: 30 TABLET | Refills: 11 | Status: SHIPPED | OUTPATIENT
Start: 2025-08-27

## 2025-08-27 RX ORDER — BACLOFEN 20 MG/1
TABLET ORAL
COMMUNITY

## 2025-08-27 RX ORDER — NITROFURANTOIN 25; 75 MG/1; MG/1
100 CAPSULE ORAL 2 TIMES DAILY
COMMUNITY
Start: 2025-08-11

## 2025-08-27 RX ORDER — TRAMADOL HYDROCHLORIDE AND ACETAMINOPHEN 37.5; 325 MG/1; MG/1
TABLET ORAL
COMMUNITY

## (undated) DEVICE — NDL HYPO REG 25G X 1 1/2

## (undated) DEVICE — NDL SPINAL 22GA 3.5 IN QUINCKE

## (undated) DEVICE — NDL SYR 10ML 18X1.5 LL BLUNT

## (undated) DEVICE — SYR 3ML LL 18GA 1.5IN

## (undated) DEVICE — GLOVE SIGNATURE MICRO LTX 6.5

## (undated) DEVICE — DRAPE UTILITY W/ TAPE 20X30IN

## (undated) DEVICE — DRAPE MEDIUM SHEET 40X70IN

## (undated) DEVICE — ADAPTER DUAL NSL LUER M-M 7FT

## (undated) DEVICE — NDL EPIDURAL TOUHY 18G X3.5

## (undated) DEVICE — SET SMARTSITE EXT SMALLBORE NF

## (undated) DEVICE — GLOVE PROTEXIS LTX MICRO 6.5

## (undated) DEVICE — CONTRAST ISOVUE M 200 20ML VIL

## (undated) DEVICE — Device

## (undated) DEVICE — CHLORAPREP 10.5 ML APPLICATOR

## (undated) DEVICE — SYR EPILOR LUER-LOK LOR 7ML

## (undated) DEVICE — NDL FLTR 5MCRN BLNT TIP 18GX1

## (undated) DEVICE — NDL QUINCKE S/SU 22GA 5IN

## (undated) DEVICE — CANNULA AIRLIFE ETCO2 NSL 7FT

## (undated) DEVICE — BANDAGE SHEER STRIP 3/4X3IN